# Patient Record
Sex: MALE | Race: BLACK OR AFRICAN AMERICAN | NOT HISPANIC OR LATINO | Employment: OTHER | URBAN - METROPOLITAN AREA
[De-identification: names, ages, dates, MRNs, and addresses within clinical notes are randomized per-mention and may not be internally consistent; named-entity substitution may affect disease eponyms.]

---

## 2023-03-28 DIAGNOSIS — I10 BENIGN ESSENTIAL HYPERTENSION: ICD-10-CM

## 2023-03-29 PROBLEM — R53.81 MALAISE: Status: ACTIVE | Noted: 2023-03-29

## 2023-03-29 PROBLEM — M79.643 HAND PAIN: Status: ACTIVE | Noted: 2023-03-29

## 2023-03-29 PROBLEM — C61 PROSTATE CANCER (MULTI): Status: ACTIVE | Noted: 2023-03-29

## 2023-03-29 PROBLEM — M19.90 ARTHRITIS: Status: ACTIVE | Noted: 2023-03-29

## 2023-03-29 PROBLEM — K92.2 GI BLEED: Status: ACTIVE | Noted: 2023-03-29

## 2023-03-29 PROBLEM — I99.8 ANGIECTASIA: Status: ACTIVE | Noted: 2023-03-29

## 2023-03-29 PROBLEM — R76.8 RHEUMATOID FACTOR POSITIVE: Status: ACTIVE | Noted: 2023-03-29

## 2023-03-29 PROBLEM — R79.89 LOW TSH LEVEL: Status: ACTIVE | Noted: 2023-03-29

## 2023-03-29 PROBLEM — E04.2 MULTIPLE THYROID NODULES: Status: ACTIVE | Noted: 2023-03-29

## 2023-03-29 PROBLEM — E78.5 HYPERLIPIDEMIA: Status: ACTIVE | Noted: 2023-03-29

## 2023-03-29 PROBLEM — I42.9 CARDIOMYOPATHY (MULTI): Status: ACTIVE | Noted: 2023-03-29

## 2023-03-29 PROBLEM — D64.9 ANEMIA: Status: ACTIVE | Noted: 2023-03-29

## 2023-03-29 PROBLEM — N52.9 MALE ERECTILE DISORDER OF ORGANIC ORIGIN: Status: ACTIVE | Noted: 2023-03-29

## 2023-03-29 PROBLEM — K63.5 COLON POLYPS: Status: ACTIVE | Noted: 2023-03-29

## 2023-03-29 PROBLEM — N18.9 CHRONIC RENAL INSUFFICIENCY: Status: ACTIVE | Noted: 2023-03-29

## 2023-03-29 PROBLEM — E05.90 SUBCLINICAL HYPERTHYROIDISM: Status: ACTIVE | Noted: 2023-03-29

## 2023-03-29 PROBLEM — M79.606 PAIN IN LOWER LIMB: Status: ACTIVE | Noted: 2023-03-29

## 2023-03-29 PROBLEM — I10 BENIGN ESSENTIAL HYPERTENSION: Status: ACTIVE | Noted: 2023-03-29

## 2023-03-29 PROBLEM — G47.30 SLEEP APNEA: Status: ACTIVE | Noted: 2023-03-29

## 2023-03-29 PROBLEM — M54.50 LOWER BACK PAIN: Status: ACTIVE | Noted: 2023-03-29

## 2023-03-29 PROBLEM — I48.92 ATRIAL FLUTTER (MULTI): Status: ACTIVE | Noted: 2023-03-29

## 2023-03-29 PROBLEM — E11.9 DIABETES MELLITUS (MULTI): Status: ACTIVE | Noted: 2023-03-29

## 2023-03-29 PROBLEM — N18.31 CHRONIC KIDNEY DISEASE, STAGE 3A (MULTI): Status: ACTIVE | Noted: 2023-03-29

## 2023-03-29 PROBLEM — I48.91 ATRIAL FIBRILLATION (MULTI): Status: ACTIVE | Noted: 2023-03-29

## 2023-03-29 PROBLEM — M79.604 PAIN OF RIGHT LOWER EXTREMITY: Status: ACTIVE | Noted: 2023-03-29

## 2023-03-29 PROBLEM — E55.9 VITAMIN D DEFICIENCY: Status: ACTIVE | Noted: 2023-03-29

## 2023-03-29 PROBLEM — M19.049 ARTHRITIS, DEGENERATIVE, LOCALIZED, PRIMARY, HAND: Status: ACTIVE | Noted: 2023-03-29

## 2023-03-29 RX ORDER — LISINOPRIL 10 MG/1
TABLET ORAL
Qty: 90 TABLET | Refills: 3 | Status: SHIPPED | OUTPATIENT
Start: 2023-03-29 | End: 2023-10-05 | Stop reason: DRUGHIGH

## 2023-04-04 ENCOUNTER — OFFICE VISIT (OUTPATIENT)
Dept: PRIMARY CARE | Facility: CLINIC | Age: 73
End: 2023-04-04
Payer: MEDICARE

## 2023-04-04 VITALS — WEIGHT: 253 LBS | BODY MASS INDEX: 37.47 KG/M2 | HEIGHT: 69 IN

## 2023-04-04 DIAGNOSIS — I42.9 CARDIOMYOPATHY, UNSPECIFIED TYPE (MULTI): ICD-10-CM

## 2023-04-04 DIAGNOSIS — N18.31 CHRONIC KIDNEY DISEASE, STAGE 3A (MULTI): ICD-10-CM

## 2023-04-04 DIAGNOSIS — I48.92 ATRIAL FLUTTER, UNSPECIFIED TYPE (MULTI): ICD-10-CM

## 2023-04-04 DIAGNOSIS — E11.69 TYPE 2 DIABETES MELLITUS WITH OTHER SPECIFIED COMPLICATION, UNSPECIFIED WHETHER LONG TERM INSULIN USE (MULTI): ICD-10-CM

## 2023-04-04 DIAGNOSIS — E66.01 SEVERE OBESITY (BMI 35.0-35.9 WITH COMORBIDITY) (MULTI): ICD-10-CM

## 2023-04-04 DIAGNOSIS — Z00.00 ROUTINE GENERAL MEDICAL EXAMINATION AT HEALTH CARE FACILITY: Primary | ICD-10-CM

## 2023-04-04 DIAGNOSIS — C61 PROSTATE CANCER (MULTI): ICD-10-CM

## 2023-04-04 PROBLEM — E66.09 CLASS 2 OBESITY DUE TO EXCESS CALORIES WITH BODY MASS INDEX (BMI) OF 39.0 TO 39.9 IN ADULT: Status: ACTIVE | Noted: 2023-04-04

## 2023-04-04 PROBLEM — M65.841 OTHER SYNOVITIS AND TENOSYNOVITIS, RIGHT HAND: Status: ACTIVE | Noted: 2023-04-04

## 2023-04-04 PROBLEM — E66.812 CLASS 2 OBESITY DUE TO EXCESS CALORIES WITH BODY MASS INDEX (BMI) OF 38.0 TO 38.9 IN ADULT: Status: ACTIVE | Noted: 2023-04-04

## 2023-04-04 PROBLEM — E66.812 CLASS 2 OBESITY DUE TO EXCESS CALORIES WITH BODY MASS INDEX (BMI) OF 39.0 TO 39.9 IN ADULT: Status: ACTIVE | Noted: 2023-04-04

## 2023-04-04 PROBLEM — M65.849 OTHER SYNOVITIS AND TENOSYNOVITIS, UNSPECIFIED HAND: Status: ACTIVE | Noted: 2023-04-04

## 2023-04-04 PROBLEM — I63.9 STROKE (MULTI): Status: ACTIVE | Noted: 2023-04-04

## 2023-04-04 PROBLEM — E66.09 CLASS 2 OBESITY DUE TO EXCESS CALORIES WITH BODY MASS INDEX (BMI) OF 38.0 TO 38.9 IN ADULT: Status: ACTIVE | Noted: 2023-04-04

## 2023-04-04 LAB
POC FINGERSTICK BLOOD GLUCOSE: 142 MG/DL (ref 70–100)
POC HEMOGLOBIN A1C: 7.4 % (ref 4.2–6.5)

## 2023-04-04 PROCEDURE — 99213 OFFICE O/P EST LOW 20 MIN: CPT | Performed by: INTERNAL MEDICINE

## 2023-04-04 PROCEDURE — 1159F MED LIST DOCD IN RCRD: CPT | Performed by: INTERNAL MEDICINE

## 2023-04-04 PROCEDURE — 82962 GLUCOSE BLOOD TEST: CPT | Performed by: INTERNAL MEDICINE

## 2023-04-04 PROCEDURE — 3008F BODY MASS INDEX DOCD: CPT | Performed by: INTERNAL MEDICINE

## 2023-04-04 PROCEDURE — 1160F RVW MEDS BY RX/DR IN RCRD: CPT | Performed by: INTERNAL MEDICINE

## 2023-04-04 PROCEDURE — 1036F TOBACCO NON-USER: CPT | Performed by: INTERNAL MEDICINE

## 2023-04-04 PROCEDURE — 83036 HEMOGLOBIN GLYCOSYLATED A1C: CPT | Performed by: INTERNAL MEDICINE

## 2023-04-04 PROCEDURE — 1170F FXNL STATUS ASSESSED: CPT | Performed by: INTERNAL MEDICINE

## 2023-04-04 PROCEDURE — G0439 PPPS, SUBSEQ VISIT: HCPCS | Performed by: INTERNAL MEDICINE

## 2023-04-04 PROCEDURE — 4010F ACE/ARB THERAPY RXD/TAKEN: CPT | Performed by: INTERNAL MEDICINE

## 2023-04-04 RX ORDER — BLOOD SUGAR DIAGNOSTIC
STRIP MISCELLANEOUS
COMMUNITY
Start: 2015-05-22

## 2023-04-04 RX ORDER — LANCETS 33 GAUGE
EACH MISCELLANEOUS
COMMUNITY
Start: 2023-01-26

## 2023-04-04 RX ORDER — PANTOPRAZOLE SODIUM 40 MG/1
1 TABLET, DELAYED RELEASE ORAL DAILY
COMMUNITY
Start: 2021-01-19 | End: 2023-08-04

## 2023-04-04 RX ORDER — DAPAGLIFLOZIN 10 MG/1
1 TABLET, FILM COATED ORAL DAILY
COMMUNITY
Start: 2023-03-10 | End: 2023-08-02

## 2023-04-04 RX ORDER — ATORVASTATIN CALCIUM 20 MG/1
1 TABLET, FILM COATED ORAL DAILY
COMMUNITY
End: 2023-11-15 | Stop reason: SDUPTHER

## 2023-04-04 RX ORDER — DAPAGLIFLOZIN 5 MG/1
1 TABLET, FILM COATED ORAL
COMMUNITY
Start: 2022-05-03 | End: 2023-04-04 | Stop reason: SDUPTHER

## 2023-04-04 RX ORDER — DILTIAZEM HYDROCHLORIDE EXTENDED-RELEASE TABLETS 240 MG/1
1 TABLET, EXTENDED RELEASE ORAL DAILY
COMMUNITY

## 2023-04-04 RX ORDER — BLOOD SUGAR DIAGNOSTIC
STRIP MISCELLANEOUS 2 TIMES DAILY
COMMUNITY
Start: 2021-07-31 | End: 2023-05-26

## 2023-04-04 RX ORDER — TRIAMTERENE AND HYDROCHLOROTHIAZIDE 75; 50 MG/1; MG/1
1 TABLET ORAL DAILY
COMMUNITY
Start: 2021-09-20 | End: 2023-12-07

## 2023-04-04 RX ORDER — WARFARIN 10 MG/1
2 TABLET ORAL DAILY
COMMUNITY
End: 2024-01-25 | Stop reason: SDUPTHER

## 2023-04-04 RX ORDER — MULTIVITAMIN
1 TABLET ORAL DAILY
COMMUNITY

## 2023-04-04 ASSESSMENT — PATIENT HEALTH QUESTIONNAIRE - PHQ9
SUM OF ALL RESPONSES TO PHQ9 QUESTIONS 1 AND 2: 0
2. FEELING DOWN, DEPRESSED OR HOPELESS: NOT AT ALL
1. LITTLE INTEREST OR PLEASURE IN DOING THINGS: NOT AT ALL

## 2023-04-04 ASSESSMENT — ACTIVITIES OF DAILY LIVING (ADL)
DOING_HOUSEWORK: INDEPENDENT
DRESSING: INDEPENDENT
GROCERY_SHOPPING: INDEPENDENT
TAKING_MEDICATION: INDEPENDENT
BATHING: INDEPENDENT
DRESSING: INDEPENDENT
MANAGING_FINANCES: INDEPENDENT
BATHING: INDEPENDENT

## 2023-04-04 ASSESSMENT — ENCOUNTER SYMPTOMS: ARTHRALGIAS: 1

## 2023-04-04 NOTE — PROGRESS NOTES
"Subjective   Reason for Visit: Elver Mc is an 72 y.o. male here for a Medicare Wellness visit.               HPI    Patient Care Team:  Manjinder Francis MD as PCP - General  Manjinder Francis MD as PCP - [employer] PCP     Review of Systems    Objective   Vitals:  Ht 1.74 m (5' 8.5\")   Wt 115 kg (253 lb)   BMI 37.91 kg/m²       Physical Exam    Assessment/Plan   Problem List Items Addressed This Visit          Endocrine/Metabolic    Diabetes mellitus (CMS/HCC)    Overview     Stable based on symptoms and exam. Continue established treatment plan and follow up at least yearly               Relevant Orders    POCT glycosylated hemoglobin (Hb A1C) manually resulted (Completed)    POCT Fingerstick Glucose manually resulted (Completed)          "

## 2023-04-04 NOTE — PROGRESS NOTES
"Subjective   Patient ID: Elver Mc is a 72 y.o. male who presents for Medicare Annual Wellness Visit Subsequent.    Patient presents for wellness exam and follow-up.  He has been compliant with his medications, diet but not exercise.  He overall feels well.  He denies any headaches, no dizziness, no chest pain or shortness of breath.  He denies abdominal pain no nausea vomiting or diarrhea.  He reports baseline arthritis symptoms.         Review of Systems   Musculoskeletal:  Positive for arthralgias.   All other systems reviewed and are negative.      Objective   Ht 1.74 m (5' 8.5\")   Wt 115 kg (253 lb)   BMI 37.91 kg/m²     Physical Exam  Constitutional:       Appearance: Normal appearance.   Cardiovascular:      Rate and Rhythm: Normal rate and regular rhythm.      Heart sounds: No murmur heard.     No gallop.   Pulmonary:      Effort: No respiratory distress.      Breath sounds: No wheezing or rales.   Abdominal:      General: There is no distension.      Palpations: There is no mass.      Tenderness: There is no abdominal tenderness. There is no guarding.   Musculoskeletal:      Right lower leg: No edema.      Left lower leg: No edema.   Neurological:      Mental Status: He is alert.         Assessment/Plan   Diagnoses and all orders for this visit:  Type 2 diabetes mellitus with other specified complication, unspecified whether long term insulin use (CMS/[facility])-hemoglobin A1c was 7.4.  He will diet and exercise.  Ophthalmology appointment has been done  -     POCT glycosylated hemoglobin (Hb A1C) manually resulted  -     POCT Fingerstick Glucose manually resulted  Cardiomyopathy, unspecified type (CMS/[facility])-he will follow-up with cardiology  Atrial flutter, unspecified type (CMS/[facility])-rate is controlled.  We will continue with anticoagulation  Severe obesity (BMI 35.0-35.9 with comorbidity) (CMS/HCC)-he will diet and exercise  Prostate cancer (CMS/[facility])-follow-up with urology  Chronic kidney disease, stage " 3a-recheck PSA at next visit.  Health maintenance-colonoscopy has been done.  Immunizations are up-to-date.

## 2023-05-25 DIAGNOSIS — E11.69 TYPE 2 DIABETES MELLITUS WITH OTHER SPECIFIED COMPLICATION, UNSPECIFIED WHETHER LONG TERM INSULIN USE (MULTI): ICD-10-CM

## 2023-05-26 RX ORDER — BLOOD SUGAR DIAGNOSTIC
STRIP MISCELLANEOUS
Qty: 200 STRIP | Refills: 2 | Status: SHIPPED | OUTPATIENT
Start: 2023-05-26 | End: 2024-02-06 | Stop reason: SDUPTHER

## 2023-07-05 ENCOUNTER — OFFICE VISIT (OUTPATIENT)
Dept: PRIMARY CARE | Facility: CLINIC | Age: 73
End: 2023-07-05
Payer: MEDICARE

## 2023-07-05 ENCOUNTER — LAB (OUTPATIENT)
Dept: LAB | Facility: LAB | Age: 73
End: 2023-07-05
Payer: MEDICARE

## 2023-07-05 VITALS
SYSTOLIC BLOOD PRESSURE: 118 MMHG | DIASTOLIC BLOOD PRESSURE: 76 MMHG | BODY MASS INDEX: 36.98 KG/M2 | HEIGHT: 68 IN | WEIGHT: 244 LBS | HEART RATE: 72 BPM

## 2023-07-05 DIAGNOSIS — E55.9 VITAMIN D DEFICIENCY: ICD-10-CM

## 2023-07-05 DIAGNOSIS — E78.2 MIXED HYPERLIPIDEMIA: ICD-10-CM

## 2023-07-05 DIAGNOSIS — E66.01 SEVERE OBESITY (BMI 35.0-35.9 WITH COMORBIDITY) (MULTI): ICD-10-CM

## 2023-07-05 DIAGNOSIS — D50.9 IRON DEFICIENCY ANEMIA, UNSPECIFIED IRON DEFICIENCY ANEMIA TYPE: ICD-10-CM

## 2023-07-05 DIAGNOSIS — E78.2 MIXED HYPERLIPIDEMIA: Primary | ICD-10-CM

## 2023-07-05 DIAGNOSIS — C61 PROSTATE CANCER (MULTI): ICD-10-CM

## 2023-07-05 DIAGNOSIS — Z12.5 SCREENING FOR PROSTATE CANCER: ICD-10-CM

## 2023-07-05 DIAGNOSIS — I48.91 ATRIAL FIBRILLATION, UNSPECIFIED TYPE (MULTI): ICD-10-CM

## 2023-07-05 DIAGNOSIS — R53.81 MALAISE: ICD-10-CM

## 2023-07-05 DIAGNOSIS — N18.2 CHRONIC RENAL IMPAIRMENT, STAGE 2 (MILD): ICD-10-CM

## 2023-07-05 DIAGNOSIS — I10 BENIGN ESSENTIAL HYPERTENSION: ICD-10-CM

## 2023-07-05 DIAGNOSIS — G47.30 SLEEP APNEA, UNSPECIFIED TYPE: ICD-10-CM

## 2023-07-05 DIAGNOSIS — I63.9 CEREBROVASCULAR ACCIDENT (CVA), UNSPECIFIED MECHANISM (MULTI): ICD-10-CM

## 2023-07-05 DIAGNOSIS — I42.9 CARDIOMYOPATHY, UNSPECIFIED TYPE (MULTI): ICD-10-CM

## 2023-07-05 DIAGNOSIS — E11.69 TYPE 2 DIABETES MELLITUS WITH OTHER SPECIFIED COMPLICATION, UNSPECIFIED WHETHER LONG TERM INSULIN USE (MULTI): ICD-10-CM

## 2023-07-05 LAB
ALANINE AMINOTRANSFERASE (SGPT) (U/L) IN SER/PLAS: 16 U/L (ref 10–52)
ALBUMIN (G/DL) IN SER/PLAS: 4.5 G/DL (ref 3.4–5)
ALBUMIN (MG/L) IN URINE: <7 MG/L
ALBUMIN/CREATININE (UG/MG) IN URINE: NORMAL UG/MG CRT (ref 0–30)
ALKALINE PHOSPHATASE (U/L) IN SER/PLAS: 63 U/L (ref 33–136)
ANION GAP IN SER/PLAS: 17 MMOL/L (ref 10–20)
APPEARANCE, URINE: CLEAR
ASPARTATE AMINOTRANSFERASE (SGOT) (U/L) IN SER/PLAS: 19 U/L (ref 9–39)
BASOPHILS (10*3/UL) IN BLOOD BY AUTOMATED COUNT: 0.01 X10E9/L (ref 0–0.1)
BASOPHILS/100 LEUKOCYTES IN BLOOD BY AUTOMATED COUNT: 0.2 % (ref 0–2)
BILIRUBIN TOTAL (MG/DL) IN SER/PLAS: 0.9 MG/DL (ref 0–1.2)
BILIRUBIN, URINE: NEGATIVE
BLOOD, URINE: NEGATIVE
CALCIDIOL (25 OH VITAMIN D3) (NG/ML) IN SER/PLAS: 31 NG/ML
CALCIUM (MG/DL) IN SER/PLAS: 10 MG/DL (ref 8.6–10.6)
CARBON DIOXIDE, TOTAL (MMOL/L) IN SER/PLAS: 26 MMOL/L (ref 21–32)
CHLORIDE (MMOL/L) IN SER/PLAS: 101 MMOL/L (ref 98–107)
CHOLESTEROL (MG/DL) IN SER/PLAS: 153 MG/DL (ref 0–199)
CHOLESTEROL IN HDL (MG/DL) IN SER/PLAS: 59 MG/DL
CHOLESTEROL/HDL RATIO: 2.6
COLOR, URINE: YELLOW
CREATININE (MG/DL) IN SER/PLAS: 1.88 MG/DL (ref 0.5–1.3)
CREATININE (MG/DL) IN URINE: 62.5 MG/DL (ref 20–370)
EOSINOPHILS (10*3/UL) IN BLOOD BY AUTOMATED COUNT: 0.04 X10E9/L (ref 0–0.4)
EOSINOPHILS/100 LEUKOCYTES IN BLOOD BY AUTOMATED COUNT: 0.9 % (ref 0–6)
ERYTHROCYTE DISTRIBUTION WIDTH (RATIO) BY AUTOMATED COUNT: 15.6 % (ref 11.5–14.5)
ERYTHROCYTE MEAN CORPUSCULAR HEMOGLOBIN CONCENTRATION (G/DL) BY AUTOMATED: 32.8 G/DL (ref 32–36)
ERYTHROCYTE MEAN CORPUSCULAR VOLUME (FL) BY AUTOMATED COUNT: 84 FL (ref 80–100)
ERYTHROCYTES (10*6/UL) IN BLOOD BY AUTOMATED COUNT: 5.11 X10E12/L (ref 4.5–5.9)
FERRITIN (UG/LL) IN SER/PLAS: 695 UG/L (ref 20–300)
GFR MALE: 38 ML/MIN/1.73M2
GLUCOSE (MG/DL) IN SER/PLAS: 109 MG/DL (ref 74–99)
GLUCOSE, URINE: ABNORMAL MG/DL
HEMATOCRIT (%) IN BLOOD BY AUTOMATED COUNT: 43 % (ref 41–52)
HEMOGLOBIN (G/DL) IN BLOOD: 14.1 G/DL (ref 13.5–17.5)
IMMATURE GRANULOCYTES/100 LEUKOCYTES IN BLOOD BY AUTOMATED COUNT: 0.9 % (ref 0–0.9)
IRON (UG/DL) IN SER/PLAS: 134 UG/DL (ref 35–150)
IRON BINDING CAPACITY (UG/DL) IN SER/PLAS: 346 UG/DL (ref 240–445)
IRON SATURATION (%) IN SER/PLAS: 39 % (ref 25–45)
KETONES, URINE: NEGATIVE MG/DL
LDL: 79 MG/DL (ref 0–99)
LEUKOCYTE ESTERASE, URINE: NEGATIVE
LEUKOCYTES (10*3/UL) IN BLOOD BY AUTOMATED COUNT: 4.3 X10E9/L (ref 4.4–11.3)
LYMPHOCYTES (10*3/UL) IN BLOOD BY AUTOMATED COUNT: 1.31 X10E9/L (ref 0.8–3)
LYMPHOCYTES/100 LEUKOCYTES IN BLOOD BY AUTOMATED COUNT: 30.3 % (ref 13–44)
MONOCYTES (10*3/UL) IN BLOOD BY AUTOMATED COUNT: 0.5 X10E9/L (ref 0.05–0.8)
MONOCYTES/100 LEUKOCYTES IN BLOOD BY AUTOMATED COUNT: 11.5 % (ref 2–10)
NEUTROPHILS (10*3/UL) IN BLOOD BY AUTOMATED COUNT: 2.43 X10E9/L (ref 1.6–5.5)
NEUTROPHILS/100 LEUKOCYTES IN BLOOD BY AUTOMATED COUNT: 56.2 % (ref 40–80)
NITRITE, URINE: NEGATIVE
NRBC (PER 100 WBCS) BY AUTOMATED COUNT: 0 /100 WBC (ref 0–0)
PH, URINE: 7 (ref 5–8)
PLATELETS (10*3/UL) IN BLOOD AUTOMATED COUNT: 192 X10E9/L (ref 150–450)
POC FINGERSTICK BLOOD GLUCOSE: 120 MG/DL (ref 70–100)
POC HEMOGLOBIN A1C: 6.6 % (ref 4.2–6.5)
POTASSIUM (MMOL/L) IN SER/PLAS: 4.6 MMOL/L (ref 3.5–5.3)
PROSTATE SPECIFIC AG (NG/ML) IN SER/PLAS: 0.99 NG/ML (ref 0–4)
PROTEIN TOTAL: 7.3 G/DL (ref 6.4–8.2)
PROTEIN, URINE: NEGATIVE MG/DL
SODIUM (MMOL/L) IN SER/PLAS: 139 MMOL/L (ref 136–145)
SPECIFIC GRAVITY, URINE: 1.01 (ref 1–1.03)
THYROTROPIN (MIU/L) IN SER/PLAS BY DETECTION LIMIT <= 0.05 MIU/L: 0.35 MIU/L (ref 0.44–3.98)
THYROXINE (T4) FREE (NG/DL) IN SER/PLAS: 1.27 NG/DL (ref 0.78–1.48)
TRIGLYCERIDE (MG/DL) IN SER/PLAS: 76 MG/DL (ref 0–149)
URATE (MG/DL) IN SER/PLAS: 7.8 MG/DL (ref 4–7.5)
UREA NITROGEN (MG/DL) IN SER/PLAS: 35 MG/DL (ref 6–23)
UROBILINOGEN, URINE: <2 MG/DL (ref 0–1.9)
VLDL: 15 MG/DL (ref 0–40)

## 2023-07-05 PROCEDURE — 83540 ASSAY OF IRON: CPT

## 2023-07-05 PROCEDURE — 82962 GLUCOSE BLOOD TEST: CPT | Performed by: INTERNAL MEDICINE

## 2023-07-05 PROCEDURE — 80061 LIPID PANEL: CPT

## 2023-07-05 PROCEDURE — 3008F BODY MASS INDEX DOCD: CPT | Performed by: INTERNAL MEDICINE

## 2023-07-05 PROCEDURE — 3078F DIAST BP <80 MM HG: CPT | Performed by: INTERNAL MEDICINE

## 2023-07-05 PROCEDURE — 36415 COLL VENOUS BLD VENIPUNCTURE: CPT

## 2023-07-05 PROCEDURE — 82043 UR ALBUMIN QUANTITATIVE: CPT

## 2023-07-05 PROCEDURE — G0103 PSA SCREENING: HCPCS

## 2023-07-05 PROCEDURE — 3074F SYST BP LT 130 MM HG: CPT | Performed by: INTERNAL MEDICINE

## 2023-07-05 PROCEDURE — 1036F TOBACCO NON-USER: CPT | Performed by: INTERNAL MEDICINE

## 2023-07-05 PROCEDURE — 80053 COMPREHEN METABOLIC PANEL: CPT

## 2023-07-05 PROCEDURE — 1159F MED LIST DOCD IN RCRD: CPT | Performed by: INTERNAL MEDICINE

## 2023-07-05 PROCEDURE — 99213 OFFICE O/P EST LOW 20 MIN: CPT | Performed by: INTERNAL MEDICINE

## 2023-07-05 PROCEDURE — 82306 VITAMIN D 25 HYDROXY: CPT

## 2023-07-05 PROCEDURE — 82570 ASSAY OF URINE CREATININE: CPT

## 2023-07-05 PROCEDURE — 82728 ASSAY OF FERRITIN: CPT

## 2023-07-05 PROCEDURE — 84550 ASSAY OF BLOOD/URIC ACID: CPT

## 2023-07-05 PROCEDURE — 81003 URINALYSIS AUTO W/O SCOPE: CPT

## 2023-07-05 PROCEDURE — 84443 ASSAY THYROID STIM HORMONE: CPT

## 2023-07-05 PROCEDURE — 85025 COMPLETE CBC W/AUTO DIFF WBC: CPT

## 2023-07-05 PROCEDURE — 83550 IRON BINDING TEST: CPT

## 2023-07-05 PROCEDURE — 1160F RVW MEDS BY RX/DR IN RCRD: CPT | Performed by: INTERNAL MEDICINE

## 2023-07-05 PROCEDURE — 4010F ACE/ARB THERAPY RXD/TAKEN: CPT | Performed by: INTERNAL MEDICINE

## 2023-07-05 PROCEDURE — 83036 HEMOGLOBIN GLYCOSYLATED A1C: CPT | Performed by: INTERNAL MEDICINE

## 2023-07-05 PROCEDURE — 84439 ASSAY OF FREE THYROXINE: CPT

## 2023-07-05 ASSESSMENT — ENCOUNTER SYMPTOMS
MYALGIAS: 0
SLEEP DISTURBANCE: 0
NAUSEA: 0
LIGHT-HEADEDNESS: 0
CHOKING: 0
STRIDOR: 0
RHINORRHEA: 0
FLANK PAIN: 0
WEAKNESS: 0
DIAPHORESIS: 0
SPEECH DIFFICULTY: 0
SHORTNESS OF BREATH: 0
SEIZURES: 0
HEADACHES: 0
TROUBLE SWALLOWING: 0
SINUS PRESSURE: 0
HEMATURIA: 0
SORE THROAT: 0
COLOR CHANGE: 0
NUMBNESS: 0
NECK PAIN: 0
BRUISES/BLEEDS EASILY: 0
ARTHRALGIAS: 0
EYE REDNESS: 0
CHEST TIGHTNESS: 0
JOINT SWELLING: 0
FACIAL ASYMMETRY: 0
VOMITING: 0
TREMORS: 0
EYE DISCHARGE: 0
DIZZINESS: 0
FREQUENCY: 0
DYSURIA: 0
HYPERTENSION: 1
ACTIVITY CHANGE: 0
POLYPHAGIA: 0
APPETITE CHANGE: 0
NECK STIFFNESS: 0
WOUND: 0
CONSTIPATION: 0
POLYDIPSIA: 0
ABDOMINAL PAIN: 0
EYE ITCHING: 0
BLOOD IN STOOL: 0
ADENOPATHY: 0
EYE PAIN: 0
DIFFICULTY URINATING: 0
VOICE CHANGE: 0
ABDOMINAL DISTENTION: 0
FATIGUE: 0
WHEEZING: 0
SINUS PAIN: 0
ANAL BLEEDING: 0
PALPITATIONS: 0
COUGH: 0
RECTAL PAIN: 0
BACK PAIN: 0
DIARRHEA: 0
CHILLS: 0
PHOTOPHOBIA: 0
FACIAL SWELLING: 0

## 2023-07-05 ASSESSMENT — PATIENT HEALTH QUESTIONNAIRE - PHQ9
SUM OF ALL RESPONSES TO PHQ9 QUESTIONS 1 AND 2: 0
1. LITTLE INTEREST OR PLEASURE IN DOING THINGS: NOT AT ALL
2. FEELING DOWN, DEPRESSED OR HOPELESS: NOT AT ALL

## 2023-07-05 NOTE — PROGRESS NOTES
Subjective   Patient ID: Elver Mc is a 72 y.o. male who presents for Hypertension and Diabetes.    Patient presents for follow-up.  He has been compliant with his medications, diet but not exercise.  He reports baseline arthritis symptoms.  He otherwise feels well.  He denies any headaches, no dizziness, chest pain or shortness of breath.  He denies abdominal pain no nausea vomiting or diarrhea.    Hypertension  Pertinent negatives include no chest pain, headaches, neck pain, palpitations or shortness of breath.   Diabetes  Pertinent negatives for hypoglycemia include no dizziness, headaches, pallor, seizures, speech difficulty or tremors. Pertinent negatives for diabetes include no chest pain, no fatigue, no polydipsia, no polyphagia, no polyuria and no weakness.        Review of Systems   Constitutional:  Negative for activity change, appetite change, chills, diaphoresis and fatigue.   HENT:  Negative for congestion, dental problem, drooling, ear discharge, ear pain, facial swelling, hearing loss, mouth sores, nosebleeds, postnasal drip, rhinorrhea, sinus pressure, sinus pain, sneezing, sore throat, tinnitus, trouble swallowing and voice change.    Eyes:  Negative for photophobia, pain, discharge, redness, itching and visual disturbance.   Respiratory:  Negative for cough, choking, chest tightness, shortness of breath, wheezing and stridor.    Cardiovascular:  Negative for chest pain, palpitations and leg swelling.   Gastrointestinal:  Negative for abdominal distention, abdominal pain, anal bleeding, blood in stool, constipation, diarrhea, nausea, rectal pain and vomiting.   Endocrine: Negative for cold intolerance, heat intolerance, polydipsia, polyphagia and polyuria.   Genitourinary:  Negative for decreased urine volume, difficulty urinating, dysuria, enuresis, flank pain, frequency, genital sores, hematuria and urgency.   Musculoskeletal:  Negative for arthralgias, back pain, gait problem, joint  "swelling, myalgias, neck pain and neck stiffness.   Skin:  Negative for color change, pallor, rash and wound.   Neurological:  Negative for dizziness, tremors, seizures, syncope, facial asymmetry, speech difficulty, weakness, light-headedness, numbness and headaches.   Hematological:  Negative for adenopathy. Does not bruise/bleed easily.   Psychiatric/Behavioral:  Negative for sleep disturbance.        Objective   /76   Pulse 72   Ht 1.727 m (5' 8\")   Wt 111 kg (244 lb)   BMI 37.10 kg/m²     Physical Exam  Constitutional:       Appearance: Normal appearance.   Cardiovascular:      Rate and Rhythm: Normal rate and regular rhythm.      Heart sounds: No murmur heard.     No gallop.   Pulmonary:      Effort: No respiratory distress.      Breath sounds: No wheezing or rales.   Abdominal:      General: There is no distension.      Palpations: There is no mass.      Tenderness: There is no abdominal tenderness. There is no guarding.   Musculoskeletal:      Right lower leg: No edema.      Left lower leg: No edema.   Neurological:      Mental Status: He is alert.         Assessment/Plan   Diagnoses and all orders for this visit:  Mixed hyperlipidemia-we will continue with atorvastatin.-  -     Lipid Panel; Future  Type 2 diabetes mellitus with other specified complication, unspecified whether long term insulin use (CMS/[facility])-hemoglobin A1c was 6.6.  Diet and exercise.  Ophthalmology appointment has been done  -     POCT Fingerstick Glucose manually resulted  -     POCT glycosylated hemoglobin (Hb A1C) manually resulted  Benign essential hypertension-low-salt diet and exercise  -     Albumin , Urine Random; Future  -     Comprehensive Metabolic Panel; Future  -     Uric Acid; Future  -     Urinalysis with Reflex Microscopic; Future  Iron deficiency anemia, unspecified iron deficiency anemia type-recheck CBC  -     Iron and TIBC; Future  -     Ferritin; Future  Malaise  -     CBC and Auto Differential; Future  -     " TSH with reflex to Free T4 if abnormal; Future  Screening for prostate cancer  -     Prostate Specific Antigen; Future  Vitamin D deficiency  -     Vitamin D, Total; Future  Cardiomyopathy, unspecified type (CMS/HCC)-follow-up with cardiology  Atrial fibrillation, unspecified type (CMS/HCC)-rate is controlled.  We will continue with anticoagulation  Severe obesity (BMI 35.0-35.9 with comorbidity) (CMS/HCC)-he will continue with diet and exercise  Chronic renal impairment, stage 2 (mild)-recheck creatinine  Prostate cancer (CMS/HCC)-check PSA  Sleep apnea, unspecified type  Cerebrovascular accident (CVA), unspecified mechanism (CMS/HCC)-we will modify risk factors.  Health maintenance-colonoscopy has been done.  Immunizations are up-to-date

## 2023-07-06 DIAGNOSIS — R79.89 LOW TSH LEVEL: ICD-10-CM

## 2023-07-06 DIAGNOSIS — R53.81 MALAISE: ICD-10-CM

## 2023-07-06 DIAGNOSIS — I10 BENIGN ESSENTIAL HYPERTENSION: Primary | ICD-10-CM

## 2023-07-20 ENCOUNTER — LAB (OUTPATIENT)
Dept: LAB | Facility: LAB | Age: 73
End: 2023-07-20
Payer: MEDICARE

## 2023-07-20 DIAGNOSIS — R53.81 MALAISE: ICD-10-CM

## 2023-07-20 DIAGNOSIS — I10 BENIGN ESSENTIAL HYPERTENSION: ICD-10-CM

## 2023-07-20 DIAGNOSIS — R79.89 LOW TSH LEVEL: ICD-10-CM

## 2023-07-20 LAB
ANION GAP IN SER/PLAS: 18 MMOL/L (ref 10–20)
CALCIUM (MG/DL) IN SER/PLAS: 10.5 MG/DL (ref 8.6–10.6)
CARBON DIOXIDE, TOTAL (MMOL/L) IN SER/PLAS: 25 MMOL/L (ref 21–32)
CHLORIDE (MMOL/L) IN SER/PLAS: 104 MMOL/L (ref 98–107)
CREATININE (MG/DL) IN SER/PLAS: 1.97 MG/DL (ref 0.5–1.3)
GFR MALE: 35 ML/MIN/1.73M2
GLUCOSE (MG/DL) IN SER/PLAS: 115 MG/DL (ref 74–99)
POTASSIUM (MMOL/L) IN SER/PLAS: 4.8 MMOL/L (ref 3.5–5.3)
SODIUM (MMOL/L) IN SER/PLAS: 142 MMOL/L (ref 136–145)
THYROTROPIN (MIU/L) IN SER/PLAS BY DETECTION LIMIT <= 0.05 MIU/L: 0.58 MIU/L (ref 0.44–3.98)
UREA NITROGEN (MG/DL) IN SER/PLAS: 27 MG/DL (ref 6–23)

## 2023-07-20 PROCEDURE — 84443 ASSAY THYROID STIM HORMONE: CPT

## 2023-07-20 PROCEDURE — 36415 COLL VENOUS BLD VENIPUNCTURE: CPT

## 2023-07-20 PROCEDURE — 80048 BASIC METABOLIC PNL TOTAL CA: CPT

## 2023-07-21 DIAGNOSIS — N18.31 CHRONIC KIDNEY DISEASE, STAGE 3A (MULTI): Primary | ICD-10-CM

## 2023-07-26 ENCOUNTER — LAB (OUTPATIENT)
Dept: LAB | Facility: LAB | Age: 73
End: 2023-07-26
Payer: MEDICARE

## 2023-07-26 DIAGNOSIS — E11.9 TYPE 2 DIABETES MELLITUS WITHOUT COMPLICATIONS (MULTI): ICD-10-CM

## 2023-07-26 DIAGNOSIS — N18.31 CHRONIC KIDNEY DISEASE, STAGE 3A (MULTI): ICD-10-CM

## 2023-07-26 LAB
ALBUMIN (G/DL) IN SER/PLAS: 4.5 G/DL (ref 3.4–5)
ANION GAP IN SER/PLAS: 17 MMOL/L (ref 10–20)
CALCIUM (MG/DL) IN SER/PLAS: 9.5 MG/DL (ref 8.6–10.6)
CARBON DIOXIDE, TOTAL (MMOL/L) IN SER/PLAS: 21 MMOL/L (ref 21–32)
CHLORIDE (MMOL/L) IN SER/PLAS: 100 MMOL/L (ref 98–107)
CREATININE (MG/DL) IN SER/PLAS: 2.86 MG/DL (ref 0.5–1.3)
GFR MALE: 23 ML/MIN/1.73M2
GLUCOSE (MG/DL) IN SER/PLAS: 99 MG/DL (ref 74–99)
PHOSPHATE (MG/DL) IN SER/PLAS: 3.2 MG/DL (ref 2.5–4.9)
POTASSIUM (MMOL/L) IN SER/PLAS: 4.2 MMOL/L (ref 3.5–5.3)
SODIUM (MMOL/L) IN SER/PLAS: 134 MMOL/L (ref 136–145)
UREA NITROGEN (MG/DL) IN SER/PLAS: 32 MG/DL (ref 6–23)

## 2023-07-26 PROCEDURE — 80069 RENAL FUNCTION PANEL: CPT

## 2023-07-26 PROCEDURE — 36415 COLL VENOUS BLD VENIPUNCTURE: CPT

## 2023-07-28 ENCOUNTER — LAB (OUTPATIENT)
Dept: LAB | Facility: LAB | Age: 73
End: 2023-07-28
Payer: MEDICARE

## 2023-07-28 DIAGNOSIS — N18.31 CHRONIC KIDNEY DISEASE, STAGE 3A (MULTI): ICD-10-CM

## 2023-07-28 DIAGNOSIS — N18.31 CHRONIC KIDNEY DISEASE, STAGE 3A (MULTI): Primary | ICD-10-CM

## 2023-07-28 LAB
ANION GAP IN SER/PLAS: 18 MMOL/L (ref 10–20)
CALCIUM (MG/DL) IN SER/PLAS: 10.1 MG/DL (ref 8.6–10.6)
CARBON DIOXIDE, TOTAL (MMOL/L) IN SER/PLAS: 22 MMOL/L (ref 21–32)
CHLORIDE (MMOL/L) IN SER/PLAS: 103 MMOL/L (ref 98–107)
CREATININE (MG/DL) IN SER/PLAS: 2.92 MG/DL (ref 0.5–1.3)
GFR MALE: 22 ML/MIN/1.73M2
GLUCOSE (MG/DL) IN SER/PLAS: 92 MG/DL (ref 74–99)
POTASSIUM (MMOL/L) IN SER/PLAS: 5.2 MMOL/L (ref 3.5–5.3)
SODIUM (MMOL/L) IN SER/PLAS: 138 MMOL/L (ref 136–145)
UREA NITROGEN (MG/DL) IN SER/PLAS: 39 MG/DL (ref 6–23)

## 2023-07-28 PROCEDURE — 80048 BASIC METABOLIC PNL TOTAL CA: CPT

## 2023-07-28 PROCEDURE — 36415 COLL VENOUS BLD VENIPUNCTURE: CPT

## 2023-08-01 DIAGNOSIS — K27.4 GASTROINTESTINAL HEMORRHAGE ASSOCIATED WITH PEPTIC ULCER: ICD-10-CM

## 2023-08-02 ENCOUNTER — LAB (OUTPATIENT)
Dept: LAB | Facility: LAB | Age: 73
End: 2023-08-02
Payer: MEDICARE

## 2023-08-02 DIAGNOSIS — N18.31 CHRONIC KIDNEY DISEASE, STAGE 3A (MULTI): ICD-10-CM

## 2023-08-02 DIAGNOSIS — N18.2 CHRONIC RENAL IMPAIRMENT, STAGE 2 (MILD): Primary | ICD-10-CM

## 2023-08-02 LAB
ANION GAP IN SER/PLAS: 15 MMOL/L (ref 10–20)
CALCIUM (MG/DL) IN SER/PLAS: 9.9 MG/DL (ref 8.6–10.6)
CARBON DIOXIDE, TOTAL (MMOL/L) IN SER/PLAS: 21 MMOL/L (ref 21–32)
CHLORIDE (MMOL/L) IN SER/PLAS: 109 MMOL/L (ref 98–107)
CREATININE (MG/DL) IN SER/PLAS: 1.93 MG/DL (ref 0.5–1.3)
GFR MALE: 36 ML/MIN/1.73M2
GLUCOSE (MG/DL) IN SER/PLAS: 102 MG/DL (ref 74–99)
POTASSIUM (MMOL/L) IN SER/PLAS: 4.2 MMOL/L (ref 3.5–5.3)
SODIUM (MMOL/L) IN SER/PLAS: 141 MMOL/L (ref 136–145)
UREA NITROGEN (MG/DL) IN SER/PLAS: 37 MG/DL (ref 6–23)

## 2023-08-02 PROCEDURE — 80048 BASIC METABOLIC PNL TOTAL CA: CPT

## 2023-08-02 PROCEDURE — 36415 COLL VENOUS BLD VENIPUNCTURE: CPT

## 2023-08-02 RX ORDER — DAPAGLIFLOZIN 10 MG/1
10 TABLET, FILM COATED ORAL DAILY
Qty: 30 TABLET | Refills: 5 | Status: SHIPPED | OUTPATIENT
Start: 2023-08-02 | End: 2024-01-25 | Stop reason: SDUPTHER

## 2023-08-04 RX ORDER — PANTOPRAZOLE SODIUM 40 MG/1
40 TABLET, DELAYED RELEASE ORAL DAILY
Qty: 100 TABLET | Refills: 2 | Status: SHIPPED | OUTPATIENT
Start: 2023-08-04 | End: 2024-04-15

## 2023-09-18 DIAGNOSIS — I48.92 ATRIAL FLUTTER, UNSPECIFIED TYPE (MULTI): ICD-10-CM

## 2023-09-18 DIAGNOSIS — Z79.01 LONG TERM (CURRENT) USE OF ANTICOAGULANTS: ICD-10-CM

## 2023-09-18 DIAGNOSIS — I48.91 ATRIAL FIBRILLATION, UNSPECIFIED TYPE (MULTI): Primary | ICD-10-CM

## 2023-09-18 LAB
INR IN PPP BY COAGULATION ASSAY EXTERNAL: 2.1
PROTHROMBIN TIME (PT) IN PPP BY COAGULATION ASSAY EXTERNAL: NORMAL SECONDS

## 2023-09-26 ENCOUNTER — DOCUMENTATION (OUTPATIENT)
Dept: CARE COORDINATION | Facility: CLINIC | Age: 73
End: 2023-09-26
Payer: MEDICARE

## 2023-10-05 ENCOUNTER — LAB (OUTPATIENT)
Dept: LAB | Facility: LAB | Age: 73
End: 2023-10-05
Payer: MEDICARE

## 2023-10-05 ENCOUNTER — OFFICE VISIT (OUTPATIENT)
Dept: PRIMARY CARE | Facility: CLINIC | Age: 73
End: 2023-10-05
Payer: MEDICARE

## 2023-10-05 VITALS
TEMPERATURE: 98 F | BODY MASS INDEX: 36.95 KG/M2 | WEIGHT: 243 LBS | DIASTOLIC BLOOD PRESSURE: 80 MMHG | HEART RATE: 72 BPM | SYSTOLIC BLOOD PRESSURE: 150 MMHG

## 2023-10-05 DIAGNOSIS — N18.2 CHRONIC RENAL IMPAIRMENT, STAGE 2 (MILD): ICD-10-CM

## 2023-10-05 DIAGNOSIS — E78.2 MIXED HYPERLIPIDEMIA: ICD-10-CM

## 2023-10-05 DIAGNOSIS — I63.9 CEREBROVASCULAR ACCIDENT (CVA), UNSPECIFIED MECHANISM (MULTI): ICD-10-CM

## 2023-10-05 DIAGNOSIS — E66.01 SEVERE OBESITY (BMI 35.0-35.9 WITH COMORBIDITY) (MULTI): ICD-10-CM

## 2023-10-05 DIAGNOSIS — I10 BENIGN ESSENTIAL HYPERTENSION: ICD-10-CM

## 2023-10-05 DIAGNOSIS — C61 PROSTATE CANCER (MULTI): ICD-10-CM

## 2023-10-05 DIAGNOSIS — E11.69 TYPE 2 DIABETES MELLITUS WITH OTHER SPECIFIED COMPLICATION, UNSPECIFIED WHETHER LONG TERM INSULIN USE (MULTI): Primary | ICD-10-CM

## 2023-10-05 DIAGNOSIS — G47.30 SLEEP APNEA, UNSPECIFIED TYPE: ICD-10-CM

## 2023-10-05 DIAGNOSIS — Z23 NEED FOR INFLUENZA VACCINATION: ICD-10-CM

## 2023-10-05 DIAGNOSIS — I48.91 ATRIAL FIBRILLATION, UNSPECIFIED TYPE (MULTI): ICD-10-CM

## 2023-10-05 LAB
ALBUMIN SERPL BCP-MCNC: 4.9 G/DL (ref 3.4–5)
ANION GAP SERPL CALC-SCNC: 21 MMOL/L (ref 10–20)
BUN SERPL-MCNC: 18 MG/DL (ref 6–23)
CALCIUM SERPL-MCNC: 10 MG/DL (ref 8.6–10.6)
CHLORIDE SERPL-SCNC: 105 MMOL/L (ref 98–107)
CO2 SERPL-SCNC: 24 MMOL/L (ref 21–32)
CREAT SERPL-MCNC: 1.2 MG/DL (ref 0.5–1.3)
GFR SERPL CREATININE-BSD FRML MDRD: 64 ML/MIN/1.73M*2
GLUCOSE SERPL-MCNC: 85 MG/DL (ref 74–99)
PHOSPHATE SERPL-MCNC: 2.7 MG/DL (ref 2.5–4.9)
POC HEMOGLOBIN A1C: 5.4 % (ref 4.2–6.5)
POTASSIUM SERPL-SCNC: 4.2 MMOL/L (ref 3.5–5.3)
SODIUM SERPL-SCNC: 146 MMOL/L (ref 136–145)

## 2023-10-05 PROCEDURE — G0008 ADMIN INFLUENZA VIRUS VAC: HCPCS | Performed by: INTERNAL MEDICINE

## 2023-10-05 PROCEDURE — 3077F SYST BP >= 140 MM HG: CPT | Performed by: INTERNAL MEDICINE

## 2023-10-05 PROCEDURE — 3008F BODY MASS INDEX DOCD: CPT | Performed by: INTERNAL MEDICINE

## 2023-10-05 PROCEDURE — 1036F TOBACCO NON-USER: CPT | Performed by: INTERNAL MEDICINE

## 2023-10-05 PROCEDURE — 99213 OFFICE O/P EST LOW 20 MIN: CPT | Performed by: INTERNAL MEDICINE

## 2023-10-05 PROCEDURE — 83036 HEMOGLOBIN GLYCOSYLATED A1C: CPT | Performed by: INTERNAL MEDICINE

## 2023-10-05 PROCEDURE — 3079F DIAST BP 80-89 MM HG: CPT | Performed by: INTERNAL MEDICINE

## 2023-10-05 PROCEDURE — 36415 COLL VENOUS BLD VENIPUNCTURE: CPT

## 2023-10-05 PROCEDURE — 90662 IIV NO PRSV INCREASED AG IM: CPT | Performed by: INTERNAL MEDICINE

## 2023-10-05 PROCEDURE — 1160F RVW MEDS BY RX/DR IN RCRD: CPT | Performed by: INTERNAL MEDICINE

## 2023-10-05 PROCEDURE — 4010F ACE/ARB THERAPY RXD/TAKEN: CPT | Performed by: INTERNAL MEDICINE

## 2023-10-05 PROCEDURE — 80069 RENAL FUNCTION PANEL: CPT

## 2023-10-05 PROCEDURE — 1159F MED LIST DOCD IN RCRD: CPT | Performed by: INTERNAL MEDICINE

## 2023-10-05 RX ORDER — LISINOPRIL 20 MG/1
20 TABLET ORAL DAILY
Qty: 90 TABLET | Refills: 3 | Status: SHIPPED | OUTPATIENT
Start: 2023-10-05 | End: 2024-06-03

## 2023-10-05 SDOH — HEALTH STABILITY: PHYSICAL HEALTH: ON AVERAGE, HOW MANY MINUTES DO YOU ENGAGE IN EXERCISE AT THIS LEVEL?: 30 MIN

## 2023-10-05 SDOH — HEALTH STABILITY: PHYSICAL HEALTH: ON AVERAGE, HOW MANY DAYS PER WEEK DO YOU ENGAGE IN MODERATE TO STRENUOUS EXERCISE (LIKE A BRISK WALK)?: 1 DAY

## 2023-10-05 ASSESSMENT — ENCOUNTER SYMPTOMS
FACIAL SWELLING: 0
RHINORRHEA: 0
DIARRHEA: 0
HEADACHES: 0
LIGHT-HEADEDNESS: 0
FATIGUE: 0
POLYPHAGIA: 0
CONSTIPATION: 0
ANAL BLEEDING: 0
ARTHRALGIAS: 1
STRIDOR: 0
SINUS PRESSURE: 0
TREMORS: 0
VOMITING: 0
CHOKING: 0
NAUSEA: 0
DIAPHORESIS: 0
COUGH: 0
FREQUENCY: 0
EYE ITCHING: 0
SLEEP DISTURBANCE: 0
DIFFICULTY URINATING: 0
POLYDIPSIA: 0
EYE PAIN: 0
EYE DISCHARGE: 0
SEIZURES: 0
FLANK PAIN: 0
BLOOD IN STOOL: 0
HEMATURIA: 0
APPETITE CHANGE: 0
CHILLS: 0
SORE THROAT: 0
ABDOMINAL PAIN: 0
BACK PAIN: 0
ABDOMINAL DISTENTION: 0
SPEECH DIFFICULTY: 0
VOICE CHANGE: 0
SINUS PAIN: 0
MYALGIAS: 0
SHORTNESS OF BREATH: 0
PALPITATIONS: 0
COLOR CHANGE: 0
TROUBLE SWALLOWING: 0
DYSURIA: 0
NUMBNESS: 0
DIZZINESS: 0
EYE REDNESS: 0
JOINT SWELLING: 0
FACIAL ASYMMETRY: 0
ACTIVITY CHANGE: 0
WHEEZING: 0
RECTAL PAIN: 0
PHOTOPHOBIA: 0
WEAKNESS: 0
CHEST TIGHTNESS: 0
ADENOPATHY: 0
NECK STIFFNESS: 0
BRUISES/BLEEDS EASILY: 0
NECK PAIN: 0
WOUND: 0

## 2023-10-05 ASSESSMENT — LIFESTYLE VARIABLES
HOW MANY STANDARD DRINKS CONTAINING ALCOHOL DO YOU HAVE ON A TYPICAL DAY: 1 OR 2
HOW OFTEN DO YOU HAVE A DRINK CONTAINING ALCOHOL: 4 OR MORE TIMES A WEEK
HOW OFTEN DO YOU HAVE SIX OR MORE DRINKS ON ONE OCCASION: NEVER
SKIP TO QUESTIONS 9-10: 1
AUDIT-C TOTAL SCORE: 4

## 2023-10-05 NOTE — PROGRESS NOTES
Subjective   Patient ID: Elver Mc is a 73 y.o. male who presents for No chief complaint on file..    Patient presents for follow-up.  He has been compliant with his medications, diet but not exercise.  He is not eating as much.  He is purposely maintaining his weight.  He overall feels well.  Denies any headaches, no dizziness, no chest pain or shortness of breath.  As abdominal pain no nausea vomiting or diarrhea.  Reports baseline arthritis symptoms.  He is using his CPAP nightly.         Review of Systems   Constitutional:  Negative for activity change, appetite change, chills, diaphoresis and fatigue.   HENT:  Negative for congestion, dental problem, drooling, ear discharge, ear pain, facial swelling, hearing loss, mouth sores, nosebleeds, postnasal drip, rhinorrhea, sinus pressure, sinus pain, sneezing, sore throat, tinnitus, trouble swallowing and voice change.    Eyes:  Negative for photophobia, pain, discharge, redness, itching and visual disturbance.   Respiratory:  Negative for cough, choking, chest tightness, shortness of breath, wheezing and stridor.    Cardiovascular:  Negative for chest pain, palpitations and leg swelling.   Gastrointestinal:  Negative for abdominal distention, abdominal pain, anal bleeding, blood in stool, constipation, diarrhea, nausea, rectal pain and vomiting.   Endocrine: Negative for cold intolerance, heat intolerance, polydipsia, polyphagia and polyuria.   Genitourinary:  Negative for decreased urine volume, difficulty urinating, dysuria, enuresis, flank pain, frequency, genital sores, hematuria and urgency.   Musculoskeletal:  Positive for arthralgias. Negative for back pain, gait problem, joint swelling, myalgias, neck pain and neck stiffness.   Skin:  Negative for color change, pallor, rash and wound.   Neurological:  Negative for dizziness, tremors, seizures, syncope, facial asymmetry, speech difficulty, weakness, light-headedness, numbness and headaches.    Hematological:  Negative for adenopathy. Does not bruise/bleed easily.   Psychiatric/Behavioral:  Negative for sleep disturbance.        Objective   Temp 36.7 °C (98 °F)   Wt 110 kg (243 lb)   BMI 36.95 kg/m²     Physical Exam  Constitutional:       Appearance: Normal appearance.   Cardiovascular:      Rate and Rhythm: Normal rate. Rhythm irregular.      Heart sounds: No murmur heard.     No gallop.   Pulmonary:      Effort: No respiratory distress.      Breath sounds: No wheezing or rales.   Abdominal:      General: There is no distension.      Palpations: There is no mass.      Tenderness: There is no abdominal tenderness. There is no guarding.   Musculoskeletal:      Right lower leg: No edema.      Left lower leg: No edema.   Neurological:      Mental Status: He is alert.         Assessment/Plan   Diagnoses and all orders for this visit:  Type 2 diabetes mellitus with other specified complication, unspecified whether long term insulin use (CMS/HCC)-hemoglobin A1c was 5.4.  Ophthalmology appointment has been done  -     POCT glycosylated hemoglobin (Hb A1C) manually resulted  Need for influenza vaccination  -     Flu vaccine, quadrivalent, high-dose, preservative free, age 65y+ (FLUZONE)  Benign essential hypertension-we will increase lisinopril to 20 mg daily.  Follow low-salt diet and exercise  -     lisinopril 20 mg tablet; Take 1 tablet (20 mg) by mouth once daily.  Atrial fibrillation, unspecified type (CMS/HCC)-rate is controlled.  Follow-up with cardiology  Mixed hyperlipidemia-we will continue with atorvastatin  Severe obesity (BMI 35.0-35.9 with comorbidity) (CMS/HCC)-patient congratulated on his weight loss  Chronic renal impairment, stage 2 (mild)-recheck creatinine  Prostate cancer (CMS/HCC)-follow-up with urology  Cerebrovascular accident (CVA), unspecified mechanism (CMS/HCC)-modify risk factors  Sleep apnea, unspecified type-use CPAP nightly.  Health maintenance-colonoscopy next year.  We will  give a flu shot today.

## 2023-10-16 ENCOUNTER — ANTICOAGULATION - WARFARIN VISIT (OUTPATIENT)
Dept: CARDIOLOGY | Facility: CLINIC | Age: 73
End: 2023-10-16
Payer: MEDICARE

## 2023-10-16 DIAGNOSIS — Z79.01 LONG TERM (CURRENT) USE OF ANTICOAGULANTS: ICD-10-CM

## 2023-10-16 DIAGNOSIS — I48.92 ATRIAL FLUTTER, UNSPECIFIED TYPE (MULTI): ICD-10-CM

## 2023-10-16 DIAGNOSIS — I48.91 ATRIAL FIBRILLATION, UNSPECIFIED TYPE (MULTI): Primary | ICD-10-CM

## 2023-10-16 LAB
POC INR: 2.7
POC PROTHROMBIN TIME: NORMAL

## 2023-10-16 PROCEDURE — 99211 OFF/OP EST MAY X REQ PHY/QHP: CPT | Performed by: INTERNAL MEDICINE

## 2023-10-16 PROCEDURE — 85610 PROTHROMBIN TIME: CPT | Mod: QW

## 2023-10-16 NOTE — PROGRESS NOTES
Patient identification verified with 2 identifiers.    Location: [facility] - [address] [address] [place], [place] [address] [phone]     Referring Physician: DR. ESCALANTE  Enrollment/ Re-enrollment date: Enrollment/Re-enrollment date: 2024  INR Goal: 2.0-3.0  INR monitoring is per AMS protocol.  Anticoagulation Medication: warfarin 10 MG  Indication: atrial fibrillation    Subjective   Bleeding signs/symptoms: No    Bruising: No   Major bleeding event: No  Thrombosis signs/symptoms: No  Thromboembolic event: No  Missed doses: No  Extra doses: No  Medication changes: No  Dietary changes: No  Change in health: No  Change in activity: No  Alcohol: No  Other concerns: No    Upcoming Surgeries:  Does the Patient Have any upcoming surgeries that require interruption in anticoagulation therapy? no  Does the patient require bridging? no      Anticoagulation Summary  As of 10/16/2023      INR goal:  2.0-3.0   TTR:  100.0 % (2.6 wk)   INR used for dosin.70 (10/16/2023)   Weekly warfarin total:  50 mg               Assessment/Plan   Therapeutic     1. New dose: no change    2. Next INR: 1 month      Education provided to patient during the visit:  Patient instructed to call in interim with questions, concerns and changes.   Patient educated on compliance with dosing, follow up appointments, and prescribed plan of care.

## 2023-11-03 DIAGNOSIS — I10 BENIGN ESSENTIAL HYPERTENSION: Primary | ICD-10-CM

## 2023-11-06 RX ORDER — DILTIAZEM HYDROCHLORIDE 240 MG/1
240 CAPSULE, COATED, EXTENDED RELEASE ORAL DAILY
Qty: 100 CAPSULE | Refills: 2 | Status: SHIPPED | OUTPATIENT
Start: 2023-11-06

## 2023-11-06 NOTE — TELEPHONE ENCOUNTER
[id number]   Detail Level: Simple Continue Regimen: OTC Hydrocortisone cream QD for 1 week on, 1 week off, prn

## 2023-11-13 ENCOUNTER — ANTICOAGULATION - WARFARIN VISIT (OUTPATIENT)
Dept: CARDIOLOGY | Facility: CLINIC | Age: 73
End: 2023-11-13
Payer: MEDICARE

## 2023-11-13 DIAGNOSIS — Z79.01 LONG TERM (CURRENT) USE OF ANTICOAGULANTS: Primary | ICD-10-CM

## 2023-11-13 LAB
POC INR: 2.4
POC PROTHROMBIN TIME: NORMAL

## 2023-11-13 PROCEDURE — 85610 PROTHROMBIN TIME: CPT | Mod: QW

## 2023-11-13 PROCEDURE — 99211 OFF/OP EST MAY X REQ PHY/QHP: CPT | Performed by: INTERNAL MEDICINE

## 2023-11-13 NOTE — PROGRESS NOTES
Patient identification verified with 2 identifiers.    Location: [facility] - [address] [address] [place], [place] [address] [phone]     Referring Physician: DR. ESCALANTE  Enrollment/ Re-enrollment date: Enrollment/Re-enrollment date: 2024  INR Goal: 2.0-3.0  INR monitoring is per AMS protocol.  Anticoagulation Medication: warfarin 10 MG  Indication: atrial fibrillation    Subjective   Bleeding signs/symptoms: No    Bruising: No   Major bleeding event: No  Thrombosis signs/symptoms: No  Thromboembolic event: No  Missed doses: No  Extra doses: No  Medication changes: No  Dietary changes: No  Change in health: No  Change in activity: No  Alcohol: No  Other concerns: No    Upcoming Surgeries:  Does the Patient Have any upcoming surgeries that require interruption in anticoagulation therapy? no  Does the patient require bridging? no      Anticoagulation Summary  As of 2023      INR goal:  2.0-3.0   TTR:  100.0 % (1.5 mo)   INR used for dosin.40 (2023)   Weekly warfarin total:  50 mg               Assessment/Plan   Therapeutic     1. New dose: no change    2. Next INR: 1 month      Education provided to patient during the visit:  Patient instructed to call in interim with questions, concerns and changes.   Patient educated on interactions between medications and warfarin.   Patient educated on compliance with dosing, follow up appointments, and prescribed plan of care.

## 2023-11-15 ENCOUNTER — OFFICE VISIT (OUTPATIENT)
Dept: PRIMARY CARE | Facility: CLINIC | Age: 73
End: 2023-11-15
Payer: MEDICARE

## 2023-11-15 VITALS
BODY MASS INDEX: 35.94 KG/M2 | SYSTOLIC BLOOD PRESSURE: 134 MMHG | WEIGHT: 236.4 LBS | HEART RATE: 72 BPM | DIASTOLIC BLOOD PRESSURE: 80 MMHG

## 2023-11-15 DIAGNOSIS — I48.91 ATRIAL FIBRILLATION, UNSPECIFIED TYPE (MULTI): ICD-10-CM

## 2023-11-15 DIAGNOSIS — I42.9 CARDIOMYOPATHY, UNSPECIFIED TYPE (MULTI): ICD-10-CM

## 2023-11-15 DIAGNOSIS — I10 BENIGN ESSENTIAL HYPERTENSION: ICD-10-CM

## 2023-11-15 DIAGNOSIS — I99.8 ANGIECTASIA: ICD-10-CM

## 2023-11-15 DIAGNOSIS — C61 PROSTATE CANCER (MULTI): ICD-10-CM

## 2023-11-15 DIAGNOSIS — I63.9 CEREBROVASCULAR ACCIDENT (CVA), UNSPECIFIED MECHANISM (MULTI): ICD-10-CM

## 2023-11-15 DIAGNOSIS — N18.31 CHRONIC KIDNEY DISEASE, STAGE 3A (MULTI): ICD-10-CM

## 2023-11-15 DIAGNOSIS — E11.69 TYPE 2 DIABETES MELLITUS WITH OTHER SPECIFIED COMPLICATION, WITHOUT LONG-TERM CURRENT USE OF INSULIN (MULTI): ICD-10-CM

## 2023-11-15 DIAGNOSIS — E78.2 MIXED HYPERLIPIDEMIA: Primary | ICD-10-CM

## 2023-11-15 PROCEDURE — 1036F TOBACCO NON-USER: CPT | Performed by: INTERNAL MEDICINE

## 2023-11-15 PROCEDURE — 3008F BODY MASS INDEX DOCD: CPT | Performed by: INTERNAL MEDICINE

## 2023-11-15 PROCEDURE — 1160F RVW MEDS BY RX/DR IN RCRD: CPT | Performed by: INTERNAL MEDICINE

## 2023-11-15 PROCEDURE — 3079F DIAST BP 80-89 MM HG: CPT | Performed by: INTERNAL MEDICINE

## 2023-11-15 PROCEDURE — 1126F AMNT PAIN NOTED NONE PRSNT: CPT | Performed by: INTERNAL MEDICINE

## 2023-11-15 PROCEDURE — 3075F SYST BP GE 130 - 139MM HG: CPT | Performed by: INTERNAL MEDICINE

## 2023-11-15 PROCEDURE — 4010F ACE/ARB THERAPY RXD/TAKEN: CPT | Performed by: INTERNAL MEDICINE

## 2023-11-15 PROCEDURE — 99213 OFFICE O/P EST LOW 20 MIN: CPT | Performed by: INTERNAL MEDICINE

## 2023-11-15 PROCEDURE — 1159F MED LIST DOCD IN RCRD: CPT | Performed by: INTERNAL MEDICINE

## 2023-11-15 ASSESSMENT — ENCOUNTER SYMPTOMS
PHOTOPHOBIA: 0
DYSURIA: 0
VOMITING: 0
FLANK PAIN: 0
VOICE CHANGE: 0
HEADACHES: 0
WEAKNESS: 0
ABDOMINAL PAIN: 0
NAUSEA: 0
SEIZURES: 0
DIARRHEA: 0
ANAL BLEEDING: 0
POLYDIPSIA: 0
RECTAL PAIN: 0
SINUS PAIN: 0
EYE ITCHING: 0
ADENOPATHY: 0
CONSTIPATION: 0
JOINT SWELLING: 0
EYE PAIN: 0
CHOKING: 0
WOUND: 0
DIFFICULTY URINATING: 0
BACK PAIN: 0
RHINORRHEA: 0
CHILLS: 0
SHORTNESS OF BREATH: 0
FACIAL ASYMMETRY: 0
NECK PAIN: 0
TROUBLE SWALLOWING: 0
ARTHRALGIAS: 0
MYALGIAS: 0
STRIDOR: 0
POLYPHAGIA: 0
SLEEP DISTURBANCE: 0
NUMBNESS: 0
BRUISES/BLEEDS EASILY: 0
NECK STIFFNESS: 0
FACIAL SWELLING: 0
EYE DISCHARGE: 0
BLOOD IN STOOL: 0
COLOR CHANGE: 0
ABDOMINAL DISTENTION: 0
ACTIVITY CHANGE: 0
WHEEZING: 0
HEMATURIA: 0
SINUS PRESSURE: 0
DIAPHORESIS: 0
APPETITE CHANGE: 0
SORE THROAT: 0
FREQUENCY: 0
CHEST TIGHTNESS: 0
PALPITATIONS: 0
EYE REDNESS: 0
LIGHT-HEADEDNESS: 0
FATIGUE: 0
SPEECH DIFFICULTY: 0
DIZZINESS: 0
COUGH: 0
TREMORS: 0

## 2023-11-15 ASSESSMENT — PATIENT HEALTH QUESTIONNAIRE - PHQ9
2. FEELING DOWN, DEPRESSED OR HOPELESS: NOT AT ALL
SUM OF ALL RESPONSES TO PHQ9 QUESTIONS 1 AND 2: 0
1. LITTLE INTEREST OR PLEASURE IN DOING THINGS: NOT AT ALL

## 2023-11-15 ASSESSMENT — PAIN SCALES - GENERAL: PAINLEVEL: 0-NO PAIN

## 2023-11-15 NOTE — PROGRESS NOTES
Subjective   Patient ID: Elver Mc is a 73 y.o. male who presents for Follow-up (diabetes).    Patient presents for follow-up.  He has been compliant with his medications, diet but not exercise.  He overall feels great.  He denies any headaches, no dizziness, no chest pain or shortness of breath.  He denies abdominal pain no nausea vomiting or diarrhea.  He reports no pain.         Review of Systems   Constitutional:  Negative for activity change, appetite change, chills, diaphoresis and fatigue.   HENT:  Negative for congestion, dental problem, drooling, ear discharge, ear pain, facial swelling, hearing loss, mouth sores, nosebleeds, postnasal drip, rhinorrhea, sinus pressure, sinus pain, sneezing, sore throat, tinnitus, trouble swallowing and voice change.    Eyes:  Negative for photophobia, pain, discharge, redness, itching and visual disturbance.   Respiratory:  Negative for cough, choking, chest tightness, shortness of breath, wheezing and stridor.    Cardiovascular:  Negative for chest pain, palpitations and leg swelling.   Gastrointestinal:  Negative for abdominal distention, abdominal pain, anal bleeding, blood in stool, constipation, diarrhea, nausea, rectal pain and vomiting.   Endocrine: Negative for cold intolerance, heat intolerance, polydipsia, polyphagia and polyuria.   Genitourinary:  Negative for decreased urine volume, difficulty urinating, dysuria, enuresis, flank pain, frequency, genital sores, hematuria and urgency.   Musculoskeletal:  Negative for arthralgias, back pain, gait problem, joint swelling, myalgias, neck pain and neck stiffness.   Skin:  Negative for color change, pallor, rash and wound.   Neurological:  Negative for dizziness, tremors, seizures, syncope, facial asymmetry, speech difficulty, weakness, light-headedness, numbness and headaches.   Hematological:  Negative for adenopathy. Does not bruise/bleed easily.   Psychiatric/Behavioral:  Negative for sleep disturbance.         Objective   /80   Pulse 72   Wt 107 kg (236 lb 6.4 oz)   BMI 35.94 kg/m²     Physical Exam  Constitutional:       Appearance: Normal appearance.   Cardiovascular:      Rate and Rhythm: Normal rate and regular rhythm.      Heart sounds: No murmur heard.     No gallop.   Pulmonary:      Effort: No respiratory distress.      Breath sounds: No wheezing or rales.   Abdominal:      General: There is no distension.      Palpations: There is no mass.      Tenderness: There is no abdominal tenderness. There is no guarding.   Musculoskeletal:      Right lower leg: No edema.      Left lower leg: No edema.   Neurological:      Mental Status: He is alert.         Assessment/Plan   Diagnoses and all orders for this visit:  Mixed hyperlipidemia-we will continue with atorvastatin  Type 2 diabetes mellitus with other specified complication, without long-term current use of insulin (CMS/HCC)-we will continue with present management  -     POCT glycosylated hemoglobin (Hb A1C) manually resulted  -     POCT Fingerstick Glucose manually resulted  Cardiomyopathy, unspecified type (CMS/HCC)-follow-up with cardiology  Benign essential hypertension-stable on present medication  Angiectasia  Atrial fibrillation, unspecified type (CMS/HCC)-rate is controlled.  We will continue with anticoagulation  Chronic kidney disease, stage 3a (CMS/HCC)-recheck creatinine at next visit  Prostate cancer (CMS/HCC)-follow-up with urology  Cerebrovascular accident (CVA), unspecified mechanism (CMS/HCC)-we will modify risk factors.  Weight loss-purposeful per patient.  We will continue with diet.  Health maintenance-colonoscopy has been done.  Immunizations are up-to-date

## 2023-12-07 DIAGNOSIS — I10 BENIGN ESSENTIAL HYPERTENSION: Primary | ICD-10-CM

## 2023-12-07 RX ORDER — TRIAMTERENE AND HYDROCHLOROTHIAZIDE 75; 50 MG/1; MG/1
1 TABLET ORAL DAILY
Qty: 100 TABLET | Refills: 2 | Status: SHIPPED | OUTPATIENT
Start: 2023-12-07

## 2023-12-11 ENCOUNTER — ANTICOAGULATION - WARFARIN VISIT (OUTPATIENT)
Dept: CARDIOLOGY | Facility: CLINIC | Age: 73
End: 2023-12-11
Payer: MEDICARE

## 2023-12-11 DIAGNOSIS — Z79.01 LONG TERM (CURRENT) USE OF ANTICOAGULANTS: Primary | ICD-10-CM

## 2023-12-11 LAB
POC INR: 2.1
POC PROTHROMBIN TIME: NORMAL

## 2023-12-11 PROCEDURE — 99211 OFF/OP EST MAY X REQ PHY/QHP: CPT | Mod: 27 | Performed by: INTERNAL MEDICINE

## 2023-12-11 PROCEDURE — 85610 PROTHROMBIN TIME: CPT | Mod: QW

## 2023-12-11 NOTE — PROGRESS NOTES
Patient identification verified with 2 identifiers.    Location: [facility] - [address] [address] [place], [place] [address] [phone]     Referring Physician: DR. ESCALANTE  Enrollment/ Re-enrollment date: Enrollment/Re-enrollment date: 2024  INR Goal: 2.0-3.0  INR monitoring is per AMS protocol.  Anticoagulation Medication: warfarin 10 MG  Indication: atrial fibrillation    Subjective   Bleeding signs/symptoms: No    Bruising: No   Major bleeding event: No  Thrombosis signs/symptoms: No  Thromboembolic event: No  Missed doses: No  Extra doses: No  Medication changes: No  Dietary changes: No  Change in health: No  Change in activity: No  Alcohol: No  Other concerns: No    Upcoming Surgeries:  Does the Patient Have any upcoming surgeries that require interruption in anticoagulation therapy? no  Does the patient require bridging? no      Anticoagulation Summary  As of 2023      INR goal:  2.0-3.0   TTR:  100.0 % (2.5 mo)   INR used for dosin.10 (2023)   Weekly warfarin total:  50 mg               Assessment/Plan   Therapeutic     1. New dose: no change    2. Next INR: 1 month      Education provided to patient during the visit:  Patient instructed to call in interim with questions, concerns and changes.   Patient educated on interactions between medications and warfarin.   Patient educated on compliance with dosing, follow up appointments, and prescribed plan of care.

## 2023-12-29 ENCOUNTER — TELEPHONE (OUTPATIENT)
Dept: PRIMARY CARE | Facility: CLINIC | Age: 73
End: 2023-12-29

## 2023-12-29 NOTE — TELEPHONE ENCOUNTER
Nohemi states that patient has been forgetting things especially places that he has been many times.  It has been going on for a minute.  Can you call Javier (son)   to get more, who has been living with him to get more information...[phone]

## 2024-01-08 ENCOUNTER — ANTICOAGULATION - WARFARIN VISIT (OUTPATIENT)
Dept: CARDIOLOGY | Facility: CLINIC | Age: 74
End: 2024-01-08
Payer: MEDICARE

## 2024-01-08 DIAGNOSIS — Z79.01 LONG TERM (CURRENT) USE OF ANTICOAGULANTS: Primary | ICD-10-CM

## 2024-01-08 LAB
POC INR: 1.1
POC PROTHROMBIN TIME: NORMAL

## 2024-01-08 PROCEDURE — 99211 OFF/OP EST MAY X REQ PHY/QHP: CPT | Performed by: INTERNAL MEDICINE

## 2024-01-08 PROCEDURE — 85610 PROTHROMBIN TIME: CPT | Mod: QW

## 2024-01-08 NOTE — PROGRESS NOTES
Patient identification verified with 2 identifiers.    Location: [facility] - [address] [address] [facility]. [place], [place] [address] [phone]     Referring Physician: DR. ESCALANTE  Enrollment/ Re-enrollment date: Enrollment/Re-enrollment date: 2024  INR Goal: 2.0-3.0  INR monitoring is per AMS protocol.  Anticoagulation Medication: warfarin 10 MG  Indication: atrial fibrillation  Subjective   Bleeding signs/symptoms: No    Bruising: No   Major bleeding event: No  Thrombosis signs/symptoms: No  Thromboembolic event: No  Missed doses: Yes  Extra doses: No  Medication changes: No  Dietary changes: No  Change in health: No  Change in activity: No  Alcohol: No  Other concerns: No    Upcoming Surgeries:  Does the Patient Have any upcoming surgeries that require interruption in anticoagulation therapy? no  Does the patient require bridging? no      Anticoagulation Summary  As of 2024      INR goal:  2.0-3.0   TTR:  75.4 % (3.4 mo)   INR used for dosin.10 (2024)   Weekly warfarin total:  50 mg               Assessment/Plan   Subtherapeutic     1. New dose: WILL MAINTAIN DOSING SINCE HE WAS THERAPEUTIC MONTHLY  2. Next INR: 1 week      Education provided to patient during the visit:  Patient instructed to call in interim with questions, concerns and changes.   Patient educated on interactions between medications and warfarin.   Patient educated on dietary consistency in vitamin k consumption.   Patient educated on signs of bleeding/clotting.   Patient educated on compliance with dosing, follow up appointments, and prescribed plan of care.

## 2024-01-15 ENCOUNTER — ANTICOAGULATION - WARFARIN VISIT (OUTPATIENT)
Dept: CARDIOLOGY | Facility: CLINIC | Age: 74
End: 2024-01-15
Payer: MEDICARE

## 2024-01-15 DIAGNOSIS — Z79.01 LONG TERM (CURRENT) USE OF ANTICOAGULANTS: Primary | ICD-10-CM

## 2024-01-15 LAB
POC INR: 1.6
POC PROTHROMBIN TIME: NORMAL

## 2024-01-15 PROCEDURE — 85610 PROTHROMBIN TIME: CPT | Mod: QW

## 2024-01-15 PROCEDURE — 99211 OFF/OP EST MAY X REQ PHY/QHP: CPT | Performed by: INTERNAL MEDICINE

## 2024-01-15 NOTE — PROGRESS NOTES
Patient identification verified with 2 identifiers.    Location: [facility] - [address] [address] [facility]. [place], [place] [address] [phone]     Referring Physician: DR. ESCALANTE  Enrollment/ Re-enrollment date: Enrollment/Re-enrollment date: 2024  INR Goal: 2.0-3.0  INR monitoring is per AMS protocol.  Anticoagulation Medication: warfarin 10 MG  Indication: atrial fibrillation    Subjective   Bleeding signs/symptoms: No    Bruising: No   Major bleeding event: No  Thrombosis signs/symptoms: No  Thromboembolic event: No  Missed doses: No  Extra doses: No  Medication changes: No  Dietary changes: No  Change in health: No  Change in activity: No  Alcohol: No  Other concerns: No    Upcoming Surgeries:  Does the Patient Have any upcoming surgeries that require interruption in anticoagulation therapy? no  Does the patient require bridging? no      Anticoagulation Summary  As of 1/15/2024      INR goal:  2.0-3.0   TTR:  70.6 % (3.6 mo)   INR used for dosin.60 (1/15/2024)   Weekly warfarin total:  55 mg               Assessment/Plan   Subtherapeutic     1. New dose: INCREASE PER PROTOCOL  2. Next INR: 1 week      Education provided to patient during the visit:  Patient instructed to call in interim with questions, concerns and changes.   Patient educated on interactions between medications and warfarin.   Patient educated on dietary consistency in vitamin k consumption.   Patient educated on signs of bleeding/clotting.   Patient educated on compliance with dosing, follow up appointments, and prescribed plan of care.

## 2024-01-22 ENCOUNTER — ANTICOAGULATION - WARFARIN VISIT (OUTPATIENT)
Dept: CARDIOLOGY | Facility: CLINIC | Age: 74
End: 2024-01-22
Payer: MEDICARE

## 2024-01-22 DIAGNOSIS — Z79.01 LONG TERM (CURRENT) USE OF ANTICOAGULANTS: Primary | ICD-10-CM

## 2024-01-22 LAB
POC INR: 1.6
POC PROTHROMBIN TIME: NORMAL

## 2024-01-22 PROCEDURE — 99211 OFF/OP EST MAY X REQ PHY/QHP: CPT | Performed by: INTERNAL MEDICINE

## 2024-01-22 PROCEDURE — 85610 PROTHROMBIN TIME: CPT | Mod: QW

## 2024-01-22 NOTE — PROGRESS NOTES
Patient identification verified with 2 identifiers.    Location: [facility] - [address] [address] [facility]. [place], [place] [address] [phone]     Referring Physician: DR. ESCALANTE  Enrollment/ Re-enrollment date: Enrollment/Re-enrollment date: 2024  INR Goal: 2.0-3.0  INR monitoring is per AMS protocol.  Anticoagulation Medication: warfarin 10 MG  Indication: atrial fibrillation    Subjective   Bleeding signs/symptoms: No    Bruising: No   Major bleeding event: No  Thrombosis signs/symptoms: No  Thromboembolic event: No  Missed doses: No  Extra doses: No  Medication changes: No  Dietary changes: No  Change in health: No  Change in activity: No  Alcohol: No  Other concerns: No    Upcoming Surgeries:  Does the Patient Have any upcoming surgeries that require interruption in anticoagulation therapy? no  Does the patient require bridging? no      Anticoagulation Summary  As of 2024      INR goal:  2.0-3.0   TTR:  66.3 % (3.9 mo)   INR used for dosin.60 (2024)   Weekly warfarin total:  60 mg               Assessment/Plan   Subtherapeutic     1. New dose: INCREASE PER PROTOCOL  2. Next INR: 1 week      Education provided to patient during the visit:  Patient instructed to call in interim with questions, concerns and changes.   Patient educated on interactions between medications and warfarin.   Patient educated on dietary consistency in vitamin k consumption.   Patient educated on signs of bleeding/clotting.   Patient educated on compliance with dosing, follow up appointments, and prescribed plan of care.

## 2024-01-25 ENCOUNTER — LAB (OUTPATIENT)
Dept: LAB | Facility: LAB | Age: 74
End: 2024-01-25
Payer: MEDICARE

## 2024-01-25 ENCOUNTER — OFFICE VISIT (OUTPATIENT)
Dept: PRIMARY CARE | Facility: CLINIC | Age: 74
End: 2024-01-25
Payer: MEDICARE

## 2024-01-25 VITALS
WEIGHT: 238.4 LBS | SYSTOLIC BLOOD PRESSURE: 136 MMHG | BODY MASS INDEX: 36.25 KG/M2 | HEART RATE: 80 BPM | DIASTOLIC BLOOD PRESSURE: 80 MMHG

## 2024-01-25 DIAGNOSIS — I10 BENIGN ESSENTIAL HYPERTENSION: ICD-10-CM

## 2024-01-25 DIAGNOSIS — N18.31 CHRONIC KIDNEY DISEASE, STAGE 3A (MULTI): ICD-10-CM

## 2024-01-25 DIAGNOSIS — E66.01 SEVERE OBESITY (BMI 35.0-35.9 WITH COMORBIDITY) (MULTI): ICD-10-CM

## 2024-01-25 DIAGNOSIS — I63.9 CEREBROVASCULAR ACCIDENT (CVA), UNSPECIFIED MECHANISM (MULTI): ICD-10-CM

## 2024-01-25 DIAGNOSIS — R41.3 MEMORY LOSS: Primary | ICD-10-CM

## 2024-01-25 DIAGNOSIS — C61 PROSTATE CANCER (MULTI): ICD-10-CM

## 2024-01-25 DIAGNOSIS — E11.9 TYPE 2 DIABETES MELLITUS WITHOUT COMPLICATIONS (MULTI): ICD-10-CM

## 2024-01-25 DIAGNOSIS — E11.69 TYPE 2 DIABETES MELLITUS WITH OTHER SPECIFIED COMPLICATION, WITHOUT LONG-TERM CURRENT USE OF INSULIN (MULTI): ICD-10-CM

## 2024-01-25 DIAGNOSIS — M79.643 PAIN OF HAND, UNSPECIFIED LATERALITY: ICD-10-CM

## 2024-01-25 DIAGNOSIS — I48.91 ATRIAL FIBRILLATION, UNSPECIFIED TYPE (MULTI): ICD-10-CM

## 2024-01-25 DIAGNOSIS — R41.3 MEMORY LOSS: ICD-10-CM

## 2024-01-25 DIAGNOSIS — I42.9 CARDIOMYOPATHY, UNSPECIFIED TYPE (MULTI): ICD-10-CM

## 2024-01-25 DIAGNOSIS — I48.92 ATRIAL FLUTTER, UNSPECIFIED TYPE (MULTI): ICD-10-CM

## 2024-01-25 LAB
ALBUMIN SERPL BCP-MCNC: 4.6 G/DL (ref 3.4–5)
ALP SERPL-CCNC: 67 U/L (ref 33–136)
ALT SERPL W P-5'-P-CCNC: 14 U/L (ref 10–52)
ANION GAP SERPL CALC-SCNC: 22 MMOL/L (ref 10–20)
APPEARANCE UR: CLEAR
AST SERPL W P-5'-P-CCNC: 16 U/L (ref 9–39)
BILIRUB SERPL-MCNC: 0.6 MG/DL (ref 0–1.2)
BILIRUB UR STRIP.AUTO-MCNC: NEGATIVE MG/DL
BUN SERPL-MCNC: 38 MG/DL (ref 6–23)
CALCIUM SERPL-MCNC: 10.1 MG/DL (ref 8.6–10.6)
CHLORIDE SERPL-SCNC: 102 MMOL/L (ref 98–107)
CO2 SERPL-SCNC: 18 MMOL/L (ref 21–32)
COLOR UR: YELLOW
CREAT SERPL-MCNC: 1.74 MG/DL (ref 0.5–1.3)
EGFRCR SERPLBLD CKD-EPI 2021: 41 ML/MIN/1.73M*2
EST. AVERAGE GLUCOSE BLD GHB EST-MCNC: 148 MG/DL
GLUCOSE SERPL-MCNC: 129 MG/DL (ref 74–99)
GLUCOSE UR STRIP.AUTO-MCNC: ABNORMAL MG/DL
HBA1C MFR BLD: 6.8 %
KETONES UR STRIP.AUTO-MCNC: NEGATIVE MG/DL
LEUKOCYTE ESTERASE UR QL STRIP.AUTO: NEGATIVE
NITRITE UR QL STRIP.AUTO: NEGATIVE
PH UR STRIP.AUTO: 5 [PH]
POTASSIUM SERPL-SCNC: 4.5 MMOL/L (ref 3.5–5.3)
PROT SERPL-MCNC: 7.7 G/DL (ref 6.4–8.2)
PROT UR STRIP.AUTO-MCNC: NEGATIVE MG/DL
RBC # UR STRIP.AUTO: NEGATIVE /UL
SODIUM SERPL-SCNC: 137 MMOL/L (ref 136–145)
SP GR UR STRIP.AUTO: 1.01
TREPONEMA PALLIDUM IGG+IGM AB [PRESENCE] IN SERUM OR PLASMA BY IMMUNOASSAY: NONREACTIVE
URATE SERPL-MCNC: 6.7 MG/DL (ref 4–7.5)
UROBILINOGEN UR STRIP.AUTO-MCNC: <2 MG/DL
VIT B12 SERPL-MCNC: 349 PG/ML (ref 211–911)

## 2024-01-25 PROCEDURE — 36415 COLL VENOUS BLD VENIPUNCTURE: CPT

## 2024-01-25 PROCEDURE — 1160F RVW MEDS BY RX/DR IN RCRD: CPT | Performed by: INTERNAL MEDICINE

## 2024-01-25 PROCEDURE — 84550 ASSAY OF BLOOD/URIC ACID: CPT

## 2024-01-25 PROCEDURE — 86780 TREPONEMA PALLIDUM: CPT

## 2024-01-25 PROCEDURE — 1159F MED LIST DOCD IN RCRD: CPT | Performed by: INTERNAL MEDICINE

## 2024-01-25 PROCEDURE — 3075F SYST BP GE 130 - 139MM HG: CPT | Performed by: INTERNAL MEDICINE

## 2024-01-25 PROCEDURE — 1170F FXNL STATUS ASSESSED: CPT | Performed by: INTERNAL MEDICINE

## 2024-01-25 PROCEDURE — 1036F TOBACCO NON-USER: CPT | Performed by: INTERNAL MEDICINE

## 2024-01-25 PROCEDURE — 3008F BODY MASS INDEX DOCD: CPT | Performed by: INTERNAL MEDICINE

## 2024-01-25 PROCEDURE — 4010F ACE/ARB THERAPY RXD/TAKEN: CPT | Performed by: INTERNAL MEDICINE

## 2024-01-25 PROCEDURE — 1126F AMNT PAIN NOTED NONE PRSNT: CPT | Performed by: INTERNAL MEDICINE

## 2024-01-25 PROCEDURE — 82607 VITAMIN B-12: CPT

## 2024-01-25 PROCEDURE — 3079F DIAST BP 80-89 MM HG: CPT | Performed by: INTERNAL MEDICINE

## 2024-01-25 PROCEDURE — 81003 URINALYSIS AUTO W/O SCOPE: CPT

## 2024-01-25 PROCEDURE — 99214 OFFICE O/P EST MOD 30 MIN: CPT | Performed by: INTERNAL MEDICINE

## 2024-01-25 PROCEDURE — 83036 HEMOGLOBIN GLYCOSYLATED A1C: CPT

## 2024-01-25 PROCEDURE — 80053 COMPREHEN METABOLIC PANEL: CPT

## 2024-01-25 RX ORDER — DAPAGLIFLOZIN 10 MG/1
10 TABLET, FILM COATED ORAL DAILY
Qty: 30 TABLET | Refills: 5 | Status: SHIPPED | OUTPATIENT
Start: 2024-01-25 | End: 2024-03-22 | Stop reason: SDUPTHER

## 2024-01-25 RX ORDER — WARFARIN 10 MG/1
TABLET ORAL
Qty: 90 TABLET | Refills: 3 | Status: SHIPPED | OUTPATIENT
Start: 2024-01-25

## 2024-01-25 ASSESSMENT — ENCOUNTER SYMPTOMS
RECTAL PAIN: 0
HEADACHES: 0
SHORTNESS OF BREATH: 0
CONSTIPATION: 0
FACIAL ASYMMETRY: 0
BACK PAIN: 0
SINUS PRESSURE: 0
ABDOMINAL DISTENTION: 0
CONFUSION: 1
BRUISES/BLEEDS EASILY: 0
EYE ITCHING: 0
EYE DISCHARGE: 0
NUMBNESS: 0
NAUSEA: 0
WHEEZING: 0
HEMATURIA: 0
NECK STIFFNESS: 0
PALPITATIONS: 0
APPETITE CHANGE: 0
CHEST TIGHTNESS: 0
ACTIVITY CHANGE: 0
STRIDOR: 0
MYALGIAS: 0
FATIGUE: 0
SORE THROAT: 0
BLOOD IN STOOL: 0
SEIZURES: 0
PHOTOPHOBIA: 0
VOICE CHANGE: 0
WEAKNESS: 0
SLEEP DISTURBANCE: 0
SPEECH DIFFICULTY: 0
ADENOPATHY: 0
ANAL BLEEDING: 0
VOMITING: 0
ABDOMINAL PAIN: 0
CHILLS: 0
POLYDIPSIA: 0
JOINT SWELLING: 0
DIARRHEA: 0
DIFFICULTY URINATING: 0
FACIAL SWELLING: 0
CHOKING: 0
EYE PAIN: 0
FLANK PAIN: 0
DIAPHORESIS: 0
NECK PAIN: 0
SINUS PAIN: 0
FREQUENCY: 0
DIZZINESS: 0
POLYPHAGIA: 0
TROUBLE SWALLOWING: 0
TREMORS: 0
RHINORRHEA: 0
COLOR CHANGE: 0
ARTHRALGIAS: 0
DYSURIA: 0
COUGH: 0
EYE REDNESS: 0
LIGHT-HEADEDNESS: 0
WOUND: 0

## 2024-01-25 ASSESSMENT — ACTIVITIES OF DAILY LIVING (ADL)
BATHING: INDEPENDENT
TAKING_MEDICATION: INDEPENDENT
DRESSING: INDEPENDENT
GROCERY_SHOPPING: INDEPENDENT
MANAGING_FINANCES: INDEPENDENT

## 2024-01-25 ASSESSMENT — PATIENT HEALTH QUESTIONNAIRE - PHQ9
1. LITTLE INTEREST OR PLEASURE IN DOING THINGS: NOT AT ALL
SUM OF ALL RESPONSES TO PHQ9 QUESTIONS 1 AND 2: 0
2. FEELING DOWN, DEPRESSED OR HOPELESS: NOT AT ALL

## 2024-01-25 ASSESSMENT — PAIN SCALES - GENERAL: PAINLEVEL: 0-NO PAIN

## 2024-01-25 NOTE — PROGRESS NOTES
Subjective   Patient ID: Elver Mc is a 73 y.o. male who presents for Follow-up (Memory concern) and Medicare Annual Wellness Visit Subsequent.    Patient presents for wellness exam and follow-up.  He has been compliant with his medications and diet but not exercise.  His family states that his memory is worsening.  He is forgetful and sometimes gets lost.  Patient does report that he has decreased memory.  His family has taken over paying his bills.  He overall feels well.  He denies any headaches, no dizziness, chest pain or shortness of breath.  He denies abdominal pain no nausea vomiting or diarrhea.  Reports no new musculoskeletal complaints.         Review of Systems   Constitutional:  Negative for activity change, appetite change, chills, diaphoresis and fatigue.   HENT:  Negative for congestion, dental problem, drooling, ear discharge, ear pain, facial swelling, hearing loss, mouth sores, nosebleeds, postnasal drip, rhinorrhea, sinus pressure, sinus pain, sneezing, sore throat, tinnitus, trouble swallowing and voice change.    Eyes:  Negative for photophobia, pain, discharge, redness, itching and visual disturbance.   Respiratory:  Negative for cough, choking, chest tightness, shortness of breath, wheezing and stridor.    Cardiovascular:  Negative for chest pain, palpitations and leg swelling.   Gastrointestinal:  Negative for abdominal distention, abdominal pain, anal bleeding, blood in stool, constipation, diarrhea, nausea, rectal pain and vomiting.   Endocrine: Negative for cold intolerance, heat intolerance, polydipsia, polyphagia and polyuria.   Genitourinary:  Negative for decreased urine volume, difficulty urinating, dysuria, enuresis, flank pain, frequency, genital sores, hematuria and urgency.   Musculoskeletal:  Negative for arthralgias, back pain, gait problem, joint swelling, myalgias, neck pain and neck stiffness.   Skin:  Negative for color change, pallor, rash and wound.   Neurological:   Negative for dizziness, tremors, seizures, syncope, facial asymmetry, speech difficulty, weakness, light-headedness, numbness and headaches.   Hematological:  Negative for adenopathy. Does not bruise/bleed easily.   Psychiatric/Behavioral:  Positive for confusion. Negative for sleep disturbance.        Objective   /80   Pulse 80   Wt 108 kg (238 lb 6.4 oz)   BMI 36.25 kg/m²     Physical Exam  Constitutional:       Appearance: Normal appearance.   Cardiovascular:      Rate and Rhythm: Normal rate and regular rhythm.      Heart sounds: No murmur heard.     No gallop.   Pulmonary:      Effort: No respiratory distress.      Breath sounds: No wheezing or rales.   Abdominal:      General: There is no distension.      Palpations: There is no mass.      Tenderness: There is no abdominal tenderness. There is no guarding.   Musculoskeletal:      Right lower leg: No edema.      Left lower leg: No edema.   Neurological:      Mental Status: He is alert.         Assessment/Plan   Diagnoses and all orders for this visit:  Memory loss-will refer to neurology for further evaluation.  Will check routine labs.  Will check an MRI.  -     Syphilis Screen with Reflex; Future  -     Vitamin B12; Future  -     MR brain wo IV contrast; Future  -     Referral to Neurology; Future  Type 2 diabetes mellitus without complications (CMS/HCC)-will check hemoglobin A1c.  -     dapagliflozin propanediol (Farxiga) 10 mg; Take 1 tablet (10 mg) by mouth once daily.  -     Comprehensive Metabolic Panel; Future  -     Uric Acid; Future  -     Hemoglobin A1c; Future  Atrial fibrillation, unspecified type (CMS/HCC)-rate is controlled.  Will continue with anticoagulation  -     warfarin (Coumadin) 10 mg tablet; Or as directed  Cardiomyopathy, unspecified type (CMS/HCC)-stable symptoms.  Follow-up with cardiology  Prostate cancer (CMS/HCC)-schedule follow-up with GI and oncology  Severe obesity (BMI 35.0-35.9 with comorbidity) (CMS/HCC)  Chronic  kidney disease, stage 3a (CMS/HCC)-recheck creatinine.  -     Urinalysis with Reflex Microscopic; Future  Type 2 diabetes mellitus with other specified complication, without long-term current use of insulin (CMS/HCC)  Benign essential hypertension-stable on present medications  Atrial flutter, unspecified type (CMS/HCC)-rate is controlled.  Will continue with anticoagulation  Pain of hand, unspecified laterality-stable symptoms  Cerebrovascular accident (CVA), unspecified mechanism (CMS/HCC)-we will modify risk factors.  Health maintenance-colonoscopy has been done.  Will get the Prevnar at next visit..  Get RSV vaccine at the pharmacy.  Family will work on getting power of .

## 2024-01-25 NOTE — PATIENT INSTRUCTIONS
Please obtain blood work and urine.  Schedule I with neurology.  Schedule appointment for your MRI.  Follow-up in 1 month.

## 2024-01-26 DIAGNOSIS — I10 BENIGN ESSENTIAL HYPERTENSION: Primary | ICD-10-CM

## 2024-01-29 ENCOUNTER — ANTICOAGULATION - WARFARIN VISIT (OUTPATIENT)
Dept: CARDIOLOGY | Facility: CLINIC | Age: 74
End: 2024-01-29
Payer: MEDICARE

## 2024-01-29 DIAGNOSIS — Z79.01 LONG TERM (CURRENT) USE OF ANTICOAGULANTS: ICD-10-CM

## 2024-01-29 DIAGNOSIS — I48.91 ATRIAL FIBRILLATION, UNSPECIFIED TYPE (MULTI): Primary | ICD-10-CM

## 2024-01-29 DIAGNOSIS — I48.92 ATRIAL FLUTTER, UNSPECIFIED TYPE (MULTI): ICD-10-CM

## 2024-01-29 LAB
POC INR: 1.8
POC PROTHROMBIN TIME: NORMAL

## 2024-01-29 PROCEDURE — 85610 PROTHROMBIN TIME: CPT | Mod: QW

## 2024-01-29 PROCEDURE — 99211 OFF/OP EST MAY X REQ PHY/QHP: CPT | Performed by: INTERNAL MEDICINE

## 2024-01-29 NOTE — PROGRESS NOTES
Patient identification verified with 2 identifiers.    Location: [facility] - [address] [address] [facility]. [place], [place] [address] [phone]     Referring Physician: DR. ESCALANTE  Enrollment/ Re-enrollment date: Enrollment/Re-enrollment date: 2024  INR Goal: 2.0-3.0  INR monitoring is per AMS protocol.  Anticoagulation Medication: warfarin 10 MG  Indication: atrial fibrillation    Subjective   Bleeding signs/symptoms: No    Bruising: No   Major bleeding event: No  Thrombosis signs/symptoms: No  Thromboembolic event: No  Missed doses: No  Extra doses: No  Medication changes: No  Dietary changes: No  Change in health: No  Change in activity: No  Alcohol: No  Other concerns: No    Upcoming Surgeries:  Does the Patient Have any upcoming surgeries that require interruption in anticoagulation therapy? no  Does the patient require bridging? no      Anticoagulation Summary  As of 2024      INR goal:  2.0-3.0   TTR:  62.6 % (4.1 mo)   INR used for dosin.80 (2024)   Weekly warfarin total:  65 mg               Assessment/Plan   Subtherapeutic     1. New dose: INCREASE PER PROTOCOL  2. Next INR: 1 week      Education provided to patient during the visit:  Patient instructed to call in interim with questions, concerns and changes.   Patient educated on interactions between medications and warfarin.   Patient educated on dietary consistency in vitamin k consumption.   Patient educated on signs of bleeding/clotting.   Patient educated on compliance with dosing, follow up appointments, and prescribed plan of care.

## 2024-02-03 DIAGNOSIS — E11.69 TYPE 2 DIABETES MELLITUS WITH OTHER SPECIFIED COMPLICATION, UNSPECIFIED WHETHER LONG TERM INSULIN USE (MULTI): ICD-10-CM

## 2024-02-05 ENCOUNTER — ANTICOAGULATION - WARFARIN VISIT (OUTPATIENT)
Dept: CARDIOLOGY | Facility: CLINIC | Age: 74
End: 2024-02-05
Payer: MEDICARE

## 2024-02-05 DIAGNOSIS — I48.92 ATRIAL FLUTTER, UNSPECIFIED TYPE (MULTI): ICD-10-CM

## 2024-02-05 DIAGNOSIS — Z79.01 LONG TERM (CURRENT) USE OF ANTICOAGULANTS: ICD-10-CM

## 2024-02-05 DIAGNOSIS — I48.91 ATRIAL FIBRILLATION, UNSPECIFIED TYPE (MULTI): Primary | ICD-10-CM

## 2024-02-05 LAB
POC INR: 1.7
POC PROTHROMBIN TIME: NORMAL

## 2024-02-05 PROCEDURE — 99211 OFF/OP EST MAY X REQ PHY/QHP: CPT | Performed by: INTERNAL MEDICINE

## 2024-02-05 PROCEDURE — 85610 PROTHROMBIN TIME: CPT | Mod: QW

## 2024-02-06 RX ORDER — BLOOD SUGAR DIAGNOSTIC
STRIP MISCELLANEOUS
Qty: 200 STRIP | Refills: 2 | Status: SHIPPED | OUTPATIENT
Start: 2024-02-06

## 2024-02-12 ENCOUNTER — ANTICOAGULATION - WARFARIN VISIT (OUTPATIENT)
Dept: CARDIOLOGY | Facility: CLINIC | Age: 74
End: 2024-02-12
Payer: MEDICARE

## 2024-02-12 DIAGNOSIS — I48.91 ATRIAL FIBRILLATION, UNSPECIFIED TYPE (MULTI): Primary | ICD-10-CM

## 2024-02-12 DIAGNOSIS — Z79.01 LONG TERM (CURRENT) USE OF ANTICOAGULANTS: ICD-10-CM

## 2024-02-12 DIAGNOSIS — I48.92 ATRIAL FLUTTER, UNSPECIFIED TYPE (MULTI): ICD-10-CM

## 2024-02-12 LAB
POC INR: 1.8 (ref 2–3)
POC PROTHROMBIN TIME: ABNORMAL

## 2024-02-12 PROCEDURE — 85610 PROTHROMBIN TIME: CPT | Mod: QW

## 2024-02-12 PROCEDURE — 99211 OFF/OP EST MAY X REQ PHY/QHP: CPT | Performed by: INTERNAL MEDICINE

## 2024-02-12 NOTE — PROGRESS NOTES
Patient identification verified with 2 identifiers.    Location: [facility] - [address] [address] [facility]. [place], [place] [address] [phone]     Referring Physician: DR. ESCALANTE  Enrollment/ Re-enrollment date: Enrollment/Re-enrollment date: 2024  INR Goal: 2.0-3.0  INR monitoring is per AMS protocol.  Anticoagulation Medication: warfarin 10 MG  Indication: atrial fibrillation    Subjective   Bleeding signs/symptoms: No    Bruising: No   Major bleeding event: No  Thrombosis signs/symptoms: No  Thromboembolic event: No  Missed doses: No  Extra doses: No  Medication changes: No  Dietary changes: No  Change in health: No  Change in activity: No  Alcohol: No  Other concerns: No    Upcoming Surgeries:  Does the Patient Have any upcoming surgeries that require interruption in anticoagulation therapy? no  Does the patient require bridging? no      Anticoagulation Summary  As of 2024      INR goal:  2.0-3.0   TTR:  56.3 % (4.6 mo)   INR used for dosin.80 (2024)   Weekly warfarin total:  80 mg               Assessment/Plan   Subtherapeutic     1. New dose:  Increased approximately 5% per protocol     2. Next INR: 1 week      Education provided to patient during the visit:  Patient instructed to call in interim with questions, concerns and changes.   Patient educated on interactions between medications and warfarin.   Patient educated on dietary consistency in vitamin k consumption.   Patient educated on signs of bleeding/clotting.   Patient educated on compliance with dosing, follow up appointments, and prescribed plan of care.

## 2024-02-13 ENCOUNTER — HOSPITAL ENCOUNTER (OUTPATIENT)
Dept: RADIOLOGY | Facility: CLINIC | Age: 74
Discharge: HOME | End: 2024-02-13
Payer: MEDICARE

## 2024-02-13 DIAGNOSIS — R41.3 MEMORY LOSS: ICD-10-CM

## 2024-02-13 PROCEDURE — 70551 MRI BRAIN STEM W/O DYE: CPT

## 2024-02-13 PROCEDURE — 70551 MRI BRAIN STEM W/O DYE: CPT | Performed by: STUDENT IN AN ORGANIZED HEALTH CARE EDUCATION/TRAINING PROGRAM

## 2024-02-19 ENCOUNTER — ANTICOAGULATION - WARFARIN VISIT (OUTPATIENT)
Dept: CARDIOLOGY | Facility: CLINIC | Age: 74
End: 2024-02-19
Payer: MEDICARE

## 2024-02-19 DIAGNOSIS — Z79.01 LONG TERM (CURRENT) USE OF ANTICOAGULANTS: ICD-10-CM

## 2024-02-19 DIAGNOSIS — I48.92 ATRIAL FLUTTER, UNSPECIFIED TYPE (MULTI): ICD-10-CM

## 2024-02-19 DIAGNOSIS — I48.91 ATRIAL FIBRILLATION, UNSPECIFIED TYPE (MULTI): Primary | ICD-10-CM

## 2024-02-19 LAB
POC INR: 2.7
POC PROTHROMBIN TIME: NORMAL

## 2024-02-19 PROCEDURE — 99211 OFF/OP EST MAY X REQ PHY/QHP: CPT | Performed by: INTERNAL MEDICINE

## 2024-02-19 PROCEDURE — 85610 PROTHROMBIN TIME: CPT | Mod: QW

## 2024-02-19 NOTE — PROGRESS NOTES
Patient identification verified with 2 identifiers.    Location: [facility] - [address] [address] [place], [place] [address] [phone]     Referring Physician: DR. ESCALANTE  Enrollment/ Re-enrollment date: Enrollment/Re-enrollment date: April 4, 2024  INR Goal: 2.0-3.0  INR monitoring is per AMS protocol.  Anticoagulation Medication: warfarin 10 MG  Indication: atrial fibrillation    Subjective   Bleeding signs/symptoms: No    Bruising: No   Major bleeding event: No  Thrombosis signs/symptoms: No  Thromboembolic event: No  Missed doses: No  Extra doses: No  Medication changes: No  Dietary changes: No  Change in health: No  Change in activity: No  Alcohol: No  Other concerns: No    Upcoming Procedures:  Does the Patient Have any upcoming procedures that require interruption in anticoagulation therapy? no  Does the patient require bridging? no      Anticoagulation Summary  As of 2/19/2024      INR goal:  2.0-3.0   TTR:  57.4 % (4.8 mo)   INR used for dosing:     Weekly warfarin total:  80 mg               Assessment/Plan   Therapeutic     1. New dose: no change    2. Next INR: 1 week      Education provided to patient during the visit:  Patient instructed to call in interim with questions, concerns and changes.   Patient educated on interactions between medications and warfarin.   Patient educated on dietary consistency in vitamin k consumption.   Patient educated on compliance with dosing, follow up appointments, and prescribed plan of care.

## 2024-02-21 ENCOUNTER — LAB (OUTPATIENT)
Dept: LAB | Facility: LAB | Age: 74
End: 2024-02-21
Payer: MEDICARE

## 2024-02-21 DIAGNOSIS — N18.31 CHRONIC KIDNEY DISEASE, STAGE 3A (MULTI): ICD-10-CM

## 2024-02-21 DIAGNOSIS — I10 BENIGN ESSENTIAL HYPERTENSION: ICD-10-CM

## 2024-02-21 DIAGNOSIS — N18.31 CHRONIC KIDNEY DISEASE, STAGE 3A (MULTI): Primary | ICD-10-CM

## 2024-02-21 LAB
ALBUMIN SERPL BCP-MCNC: 4.3 G/DL (ref 3.4–5)
ANION GAP SERPL CALC-SCNC: 14 MMOL/L (ref 10–20)
BUN SERPL-MCNC: 40 MG/DL (ref 6–23)
CALCIUM SERPL-MCNC: 9.6 MG/DL (ref 8.6–10.6)
CHLORIDE SERPL-SCNC: 103 MMOL/L (ref 98–107)
CO2 SERPL-SCNC: 24 MMOL/L (ref 21–32)
CREAT SERPL-MCNC: 1.99 MG/DL (ref 0.5–1.3)
EGFRCR SERPLBLD CKD-EPI 2021: 35 ML/MIN/1.73M*2
GLUCOSE SERPL-MCNC: 96 MG/DL (ref 74–99)
PHOSPHATE SERPL-MCNC: 3.1 MG/DL (ref 2.5–4.9)
POTASSIUM SERPL-SCNC: 5 MMOL/L (ref 3.5–5.3)
PROT SERPL-MCNC: 6.8 G/DL (ref 6.4–8.2)
SODIUM SERPL-SCNC: 136 MMOL/L (ref 136–145)

## 2024-02-21 PROCEDURE — 80069 RENAL FUNCTION PANEL: CPT

## 2024-02-21 PROCEDURE — 84165 PROTEIN E-PHORESIS SERUM: CPT

## 2024-02-21 PROCEDURE — 84165 PROTEIN E-PHORESIS SERUM: CPT | Performed by: INTERNAL MEDICINE

## 2024-02-21 PROCEDURE — 36415 COLL VENOUS BLD VENIPUNCTURE: CPT

## 2024-02-22 LAB
ALBUMIN: 4.1 G/DL (ref 3.4–5)
ALPHA 1 GLOBULIN: 0.2 G/DL (ref 0.2–0.6)
ALPHA 2 GLOBULIN: 0.7 G/DL (ref 0.4–1.1)
BETA GLOBULIN: 0.8 G/DL (ref 0.5–1.2)
GAMMA GLOBULIN: 0.9 G/DL (ref 0.5–1.4)
PATH REVIEW-SERUM PROTEIN ELECTROPHORESIS: NORMAL
PROTEIN ELECTROPHORESIS COMMENT: NORMAL

## 2024-02-26 ENCOUNTER — ANTICOAGULATION - WARFARIN VISIT (OUTPATIENT)
Dept: CARDIOLOGY | Facility: CLINIC | Age: 74
End: 2024-02-26
Payer: MEDICARE

## 2024-02-26 DIAGNOSIS — Z79.01 LONG TERM (CURRENT) USE OF ANTICOAGULANTS: ICD-10-CM

## 2024-02-26 DIAGNOSIS — I48.92 ATRIAL FLUTTER, UNSPECIFIED TYPE (MULTI): ICD-10-CM

## 2024-02-26 DIAGNOSIS — I48.91 ATRIAL FIBRILLATION, UNSPECIFIED TYPE (MULTI): Primary | ICD-10-CM

## 2024-02-26 LAB
POC INR: 2.7
POC PROTHROMBIN TIME: NORMAL

## 2024-02-26 PROCEDURE — 99211 OFF/OP EST MAY X REQ PHY/QHP: CPT

## 2024-02-26 PROCEDURE — 85610 PROTHROMBIN TIME: CPT | Mod: QW

## 2024-02-26 NOTE — PROGRESS NOTES
Patient identification verified with 2 identifiers.    Location: [facility] - [address] [address] [place], [place] [address] [phone]     Referring Physician: DR. ESCALANTE  Enrollment/ Re-enrollment date: Enrollment/Re-enrollment date: 2024  INR Goal: 2.0-3.0  INR monitoring is per AMS protocol.  Anticoagulation Medication: warfarin 10 MG  Indication: atrial fibrillation    Subjective   Bleeding signs/symptoms: No    Bruising: No   Major bleeding event: No  Thrombosis signs/symptoms: No  Thromboembolic event: No  Missed doses: No  Extra doses: No  Medication changes: No  Dietary changes: No  Change in health: No  Change in activity: No  Alcohol: No  Other concerns: No    Upcoming Procedures:  Does the Patient Have any upcoming procedures that require interruption in anticoagulation therapy? no  Does the patient require bridging? no      Anticoagulation Summary  As of 2024      INR goal:  2.0-3.0   TTR:  59.4 % (5 mo)   INR used for dosin.70 (2024)   Weekly warfarin total:  80 mg               Assessment/Plan   Therapeutic     1. New dose: no change    2. Next INR: 2 weeks      Education provided to patient during the visit:  Patient instructed to call in interim with questions, concerns and changes.   Patient educated on interactions between medications and warfarin.   Patient educated on compliance with dosing, follow up appointments, and prescribed plan of care.

## 2024-02-27 DIAGNOSIS — N18.31 CHRONIC KIDNEY DISEASE, STAGE 3A (MULTI): Primary | ICD-10-CM

## 2024-03-11 ENCOUNTER — ANTICOAGULATION - WARFARIN VISIT (OUTPATIENT)
Dept: CARDIOLOGY | Facility: CLINIC | Age: 74
End: 2024-03-11
Payer: MEDICARE

## 2024-03-11 DIAGNOSIS — I48.91 ATRIAL FIBRILLATION, UNSPECIFIED TYPE (MULTI): Primary | ICD-10-CM

## 2024-03-11 DIAGNOSIS — Z79.01 LONG TERM (CURRENT) USE OF ANTICOAGULANTS: ICD-10-CM

## 2024-03-11 DIAGNOSIS — I48.92 ATRIAL FLUTTER, UNSPECIFIED TYPE (MULTI): ICD-10-CM

## 2024-03-11 LAB
POC INR: 4.4 (ref 2–3)
POC PROTHROMBIN TIME: ABNORMAL

## 2024-03-11 PROCEDURE — 99211 OFF/OP EST MAY X REQ PHY/QHP: CPT

## 2024-03-11 PROCEDURE — 85610 PROTHROMBIN TIME: CPT | Mod: QW

## 2024-03-18 ENCOUNTER — ANTICOAGULATION - WARFARIN VISIT (OUTPATIENT)
Dept: CARDIOLOGY | Facility: CLINIC | Age: 74
End: 2024-03-18
Payer: MEDICARE

## 2024-03-18 DIAGNOSIS — I48.92 ATRIAL FLUTTER, UNSPECIFIED TYPE (MULTI): ICD-10-CM

## 2024-03-18 DIAGNOSIS — Z79.01 LONG TERM (CURRENT) USE OF ANTICOAGULANTS: ICD-10-CM

## 2024-03-18 DIAGNOSIS — I48.91 ATRIAL FIBRILLATION, UNSPECIFIED TYPE (MULTI): Primary | ICD-10-CM

## 2024-03-18 LAB
POC INR: 2.9
POC PROTHROMBIN TIME: NORMAL

## 2024-03-18 PROCEDURE — 85610 PROTHROMBIN TIME: CPT | Mod: QW

## 2024-03-18 PROCEDURE — 99211 OFF/OP EST MAY X REQ PHY/QHP: CPT

## 2024-03-18 NOTE — PROGRESS NOTES
Patient identification verified with 2 identifiers.    Location: [facility] - [address] [address] [place], [place] [address] [phone]     Referring Physician: DR. ESCALANTE  Enrollment/ Re-enrollment date: Enrollment/Re-enrollment date: 2024  INR Goal: 2.0-3.0  INR monitoring is per AMS protocol.  Anticoagulation Medication: warfarin 10 MG  Indication: atrial fibrillation  Subjective   Bleeding signs/symptoms: No    Bruising: No   Major bleeding event: No  Thrombosis signs/symptoms: No  Thromboembolic event: No  Missed doses: No  Extra doses: No  Medication changes: No  Dietary changes: No  Change in health: No  Change in activity: No  Alcohol: No  Other concerns: No    Upcoming Procedures:  Does the Patient Have any upcoming procedures that require interruption in anticoagulation therapy? no  Does the patient require bridging? no      Anticoagulation Summary  As of 3/18/2024      INR goal:  2.0-3.0   TTR:  53.8 % (5.7 mo)   INR used for dosin.90 (3/18/2024)   Weekly warfarin total:  80 mg               Assessment/Plan   Therapeutic     1. New dose: no change    2. Next INR: 1 week      Education provided to patient during the visit:  Patient instructed to call in interim with questions, concerns and changes.   Patient educated on interactions between medications and warfarin.   Patient educated on compliance with dosing, follow up appointments, and prescribed plan of care.

## 2024-03-22 DIAGNOSIS — E11.9 TYPE 2 DIABETES MELLITUS WITHOUT COMPLICATIONS (MULTI): ICD-10-CM

## 2024-03-22 RX ORDER — DAPAGLIFLOZIN 10 MG/1
10 TABLET, FILM COATED ORAL DAILY
Qty: 30 TABLET | Refills: 5 | Status: SHIPPED | OUTPATIENT
Start: 2024-03-22 | End: 2024-03-26 | Stop reason: SDUPTHER

## 2024-03-25 ENCOUNTER — ANTICOAGULATION - WARFARIN VISIT (OUTPATIENT)
Dept: CARDIOLOGY | Facility: CLINIC | Age: 74
End: 2024-03-25
Payer: MEDICARE

## 2024-03-25 DIAGNOSIS — I48.91 ATRIAL FIBRILLATION, UNSPECIFIED TYPE (MULTI): Primary | ICD-10-CM

## 2024-03-25 DIAGNOSIS — Z79.01 LONG TERM (CURRENT) USE OF ANTICOAGULANTS: ICD-10-CM

## 2024-03-25 DIAGNOSIS — I48.92 ATRIAL FLUTTER, UNSPECIFIED TYPE (MULTI): ICD-10-CM

## 2024-03-25 LAB
POC INR: 4.2
POC PROTHROMBIN TIME: NORMAL

## 2024-03-25 PROCEDURE — 85610 PROTHROMBIN TIME: CPT | Mod: QW

## 2024-03-25 PROCEDURE — 99211 OFF/OP EST MAY X REQ PHY/QHP: CPT

## 2024-03-25 NOTE — PROGRESS NOTES
Patient identification verified with 2 identifiers.    Location: [facility] - [address] [address] [facility]. [place], [place] [address] [phone]     Referring Physician: DR. ESCALANTE  Enrollment/ Re-enrollment date: Enrollment/Re-enrollment date: 2024 SENT TO MD ESCALANTE ON 3/25/24  INR Goal: 2.0-3.0  INR monitoring is per AMS protocol.  Anticoagulation Medication: warfarin 10 MG  Indication: atrial fibrillation    Subjective   Bleeding signs/symptoms: No    Bruising: No   Major bleeding event: No  Thrombosis signs/symptoms: No  Thromboembolic event: No  Missed doses: No  Extra doses: No  Medication changes: No  Dietary changes: Yes  PT HAS BEEN EDUCATED ON EATING ORANGES AND TANGERINES, HE SOMETIMES EATS SEVERAL IN A DAY  Change in health: No  Change in activity: No  Alcohol: No  Other concerns: No    Upcoming Procedures:  Does the Patient Have any upcoming procedures that require interruption in anticoagulation therapy? no  Does the patient require bridging? no      Anticoagulation Summary  As of 3/25/2024      INR goal:  2.0-3.0   TTR:  52.0 % (6 mo)   INR used for dosin.20 (3/25/2024)   Weekly warfarin total:  70 mg               Assessment/Plan   Supratherapeutic     1. New dose: HOLD 2 DOSES AND WILL DECREASE PER PROTOCOL  2. Next INR: 1 week      Education provided to patient during the visit:  Patient instructed to call in interim with questions, concerns and changes.   Patient educated on interactions between medications and warfarin.   Patient educated on dietary consistency in vitamin k consumption.   Patient educated on signs of bleeding/clotting.   Patient educated on compliance with dosing, follow up appointments, and prescribed plan of care.

## 2024-03-26 DIAGNOSIS — E11.9 TYPE 2 DIABETES MELLITUS WITHOUT COMPLICATIONS (MULTI): ICD-10-CM

## 2024-03-26 RX ORDER — DAPAGLIFLOZIN 10 MG/1
10 TABLET, FILM COATED ORAL DAILY
Qty: 30 TABLET | Refills: 5 | Status: SHIPPED | OUTPATIENT
Start: 2024-03-26 | End: 2024-04-16 | Stop reason: SDUPTHER

## 2024-04-01 ENCOUNTER — ANTICOAGULATION - WARFARIN VISIT (OUTPATIENT)
Dept: CARDIOLOGY | Facility: CLINIC | Age: 74
End: 2024-04-01
Payer: MEDICARE

## 2024-04-01 DIAGNOSIS — Z79.01 LONG TERM (CURRENT) USE OF ANTICOAGULANTS: ICD-10-CM

## 2024-04-01 DIAGNOSIS — I48.92 ATRIAL FLUTTER, UNSPECIFIED TYPE (MULTI): ICD-10-CM

## 2024-04-01 DIAGNOSIS — I48.91 ATRIAL FIBRILLATION, UNSPECIFIED TYPE (MULTI): Primary | ICD-10-CM

## 2024-04-01 LAB
POC INR: 2.2
POC PROTHROMBIN TIME: NORMAL

## 2024-04-01 PROCEDURE — 85610 PROTHROMBIN TIME: CPT | Mod: QW

## 2024-04-01 PROCEDURE — 99211 OFF/OP EST MAY X REQ PHY/QHP: CPT

## 2024-04-01 NOTE — PROGRESS NOTES
Patient identification verified with 2 identifiers.    Location: [facility] - [address] [address] [facility]. [place], [place] [address] [phone]     Referring Physician: DR. STEPHEN ESCALANTE  Enrollment/ Re-enrollment date: 3/25/25  INR Goal: 2.0-3.0  INR monitoring is per [facility] protocol.  Anticoagulation Medication: warfarin 10 MG TABLETS  Indication: atrial fibrillation    Subjective   Bleeding signs/symptoms: No  Bruising: No   Major bleeding event: No  Thrombosis signs/symptoms: No  Thromboembolic event: No  Missed doses: No  Extra doses: No  Medication changes: No  Dietary changes: No  Change in health: No  Change in activity: No  Alcohol: No  Other concerns: No    Upcoming Procedures:  Does the Patient Have any upcoming procedures that require interruption in anticoagulation therapy? no  Does the patient require bridging? no      Anticoagulation Summary  As of 2024      INR goal:  2.0-3.0   TTR:  51.6 % (6.2 mo)   INR used for dosin.20 (2024)   Weekly warfarin total:  70 mg               Assessment/Plan   Therapeutic     1. New dose: no change    2. Next INR: 1 week      Education provided to patient during the visit:  Patient instructed to call in interim with questions, concerns and changes.   Patient educated on interactions between medications and warfarin.   Patient educated on dietary consistency in vitamin k consumption.   Patient educated on affects of alcohol consumption while taking warfarin.   Patient educated on signs of bleeding/clotting.   Patient educated on compliance with dosing, follow up appointments, and prescribed plan of care.

## 2024-04-08 ENCOUNTER — ANTICOAGULATION - WARFARIN VISIT (OUTPATIENT)
Dept: CARDIOLOGY | Facility: CLINIC | Age: 74
End: 2024-04-08
Payer: MEDICARE

## 2024-04-08 DIAGNOSIS — I48.91 ATRIAL FIBRILLATION, UNSPECIFIED TYPE (MULTI): Primary | ICD-10-CM

## 2024-04-08 DIAGNOSIS — I48.92 ATRIAL FLUTTER, UNSPECIFIED TYPE (MULTI): ICD-10-CM

## 2024-04-08 DIAGNOSIS — Z79.01 LONG TERM (CURRENT) USE OF ANTICOAGULANTS: ICD-10-CM

## 2024-04-08 LAB
POC INR: 3.2
POC PROTHROMBIN TIME: NORMAL

## 2024-04-08 PROCEDURE — 99211 OFF/OP EST MAY X REQ PHY/QHP: CPT

## 2024-04-08 PROCEDURE — 85610 PROTHROMBIN TIME: CPT | Mod: QW

## 2024-04-08 NOTE — PROGRESS NOTES
Patient identification verified with 2 identifiers.    Location: [facility] - [address] [address] [facility]. [place], [place] [address] [phone]     Referring Physician: Dr. Tang Pino  Enrollment/ Re-enrollment date: 3/25/2025   INR Goal: 2.0-3.0  INR monitoring is per [facility] protocol.  Anticoagulation Medication: warfarin  Indication: Atrial Fibrillation/Atrial Flutter    Subjective   Bleeding signs/symptoms: No    Bruising: No   Major bleeding event: No  Thrombosis signs/symptoms: No  Thromboembolic event: No  Missed doses: No  Extra doses: No  Medication changes: No  Dietary changes: No  Change in health: No  Change in activity: No  Alcohol: No  Other concerns: No    Upcoming Procedures:  Does the Patient Have any upcoming procedures that require interruption in anticoagulation therapy? no  Does the patient require bridging? no      Anticoagulation Summary  As of 4/8/2024      INR goal:  2.0-3.0   TTR:  52.7 % (6.4 mo)   INR used for dosing:  3.20 (4/8/2024)   Weekly warfarin total:  65 mg               Assessment/Plan   Supratherapeutic     1. New dose:  Reduce TWD 5%.      2. Next INR: 1 week      Education provided to patient during the visit:  Patient instructed to call in interim with questions, concerns and changes.   Patient educated on interactions between medications and warfarin.   Patient educated on dietary consistency in vitamin k consumption.   Patient educated on affects of alcohol consumption while taking warfarin.   Patient educated on signs of bleeding/clotting.   Patient educated on compliance with dosing, follow up appointments, and prescribed plan of care.

## 2024-04-09 ENCOUNTER — OFFICE VISIT (OUTPATIENT)
Dept: NEUROLOGY | Facility: HOSPITAL | Age: 74
End: 2024-04-09
Payer: MEDICARE

## 2024-04-09 VITALS
DIASTOLIC BLOOD PRESSURE: 67 MMHG | SYSTOLIC BLOOD PRESSURE: 105 MMHG | HEIGHT: 68 IN | BODY MASS INDEX: 36.25 KG/M2 | TEMPERATURE: 96.9 F | HEART RATE: 77 BPM

## 2024-04-09 DIAGNOSIS — R41.3 MEMORY LOSS: Primary | ICD-10-CM

## 2024-04-09 DIAGNOSIS — F03.A0 MILD DEMENTIA, UNSPECIFIED DEMENTIA TYPE, UNSPECIFIED WHETHER BEHAVIORAL, PSYCHOTIC, OR MOOD DISTURBANCE OR ANXIETY (MULTI): ICD-10-CM

## 2024-04-09 PROCEDURE — 3044F HG A1C LEVEL LT 7.0%: CPT | Performed by: PSYCHIATRY & NEUROLOGY

## 2024-04-09 PROCEDURE — 1036F TOBACCO NON-USER: CPT | Performed by: PSYCHIATRY & NEUROLOGY

## 2024-04-09 PROCEDURE — 1160F RVW MEDS BY RX/DR IN RCRD: CPT | Performed by: PSYCHIATRY & NEUROLOGY

## 2024-04-09 PROCEDURE — 1159F MED LIST DOCD IN RCRD: CPT | Performed by: PSYCHIATRY & NEUROLOGY

## 2024-04-09 PROCEDURE — 4010F ACE/ARB THERAPY RXD/TAKEN: CPT | Performed by: PSYCHIATRY & NEUROLOGY

## 2024-04-09 PROCEDURE — 99205 OFFICE O/P NEW HI 60 MIN: CPT | Performed by: PSYCHIATRY & NEUROLOGY

## 2024-04-09 PROCEDURE — 3008F BODY MASS INDEX DOCD: CPT | Performed by: PSYCHIATRY & NEUROLOGY

## 2024-04-09 PROCEDURE — 3078F DIAST BP <80 MM HG: CPT | Performed by: PSYCHIATRY & NEUROLOGY

## 2024-04-09 PROCEDURE — G2211 COMPLEX E/M VISIT ADD ON: HCPCS | Performed by: PSYCHIATRY & NEUROLOGY

## 2024-04-09 PROCEDURE — 99417 PROLNG OP E/M EACH 15 MIN: CPT | Performed by: PSYCHIATRY & NEUROLOGY

## 2024-04-09 PROCEDURE — 3074F SYST BP LT 130 MM HG: CPT | Performed by: PSYCHIATRY & NEUROLOGY

## 2024-04-09 PROCEDURE — 99215 OFFICE O/P EST HI 40 MIN: CPT | Performed by: PSYCHIATRY & NEUROLOGY

## 2024-04-09 RX ORDER — DONEPEZIL HYDROCHLORIDE 5 MG/1
5 TABLET, FILM COATED ORAL NIGHTLY
Qty: 30 TABLET | Refills: 5 | Status: SHIPPED | OUTPATIENT
Start: 2024-04-09 | End: 2025-04-09

## 2024-04-09 NOTE — PROGRESS NOTES
Provider impressions:  Mr. Mc  is a 73 -year-old  man with 12  years of formal education and PMHx of stroke in 2006, Afib on Warfarin, cardiomyopathy HTN, HLD, CKD, T2DM and prostate cancer who is presenting today with chief complaint of cognitive changes. He is referred by PCP, Dr. Francis  for cognitive evaluation. He is accompanied by his daughter, Justin. He has been having STM issues for the last year, he lives with his son who helps him with iADLs, neurological examination is reassuring, he scored 12/30 on MoCA today.  MRI brain wo contrast on 2/13/2024 showed mild brain parenchymal volume loss, small focus of encephalomalacia in medial left occipital lobe, chronic right thalamic infarct. There are scattered nonspecific white matter changes within the cerebral hemispheres bilaterally which while nonspecific, given the patient's age, likely represent sequelae of more remote small-vessel, no acute intracranial abnormalities. TSH and vitamin B12 recently checked and came back in normal range.  Given the history and today's evaluation, I suspect mild dementia with behavioral disturbance  Potential etiologies include Alzheimer's disease, vascular dementia, a multifactorial etiology can't be entirely ruled out.  We discussed starting donepezil, 5 mg daily  Potential side effects include nausea, low heart rate and low blood pressure, vivid dreams if taken late at night.     PLAN:  I ordered EKG to rule out Qtc prolongation  Start donepezil 5 mg daily after getting an EKG   I recommended adequate control of vascular risk factors.  We discussed strategies to remain physically and mentally active.  Follow up in 3 months or earlier if needed.    Please call [phone] if you have any questions.     History of present illness:  Mr. Mc  is a 73 -year-old  man with 12  years of formal education and PMHx of stroke in 2006, Afib on Warfarin, cardiomyopathy HTN, HLD, CKD, T2DM and prostate cancer who is  "presenting today with chief complaint of cognitive changes. He is referred by PCP, Dr. Francis  for cognitive evaluation. He is accompanied by his daughter, Justin.  Brunilda has been noticing cognitive changes for less than a year. Onset was gradual, forgets scheduled events but uses a calendar, misplaces objects. Forgets conversations. Denies repeating self.   Denies any functional impairment. He lives with his son, Javier who manages finances and helps him with iADLs.    Mood is \"good.\". has not noticed any significant depressive symptoms. Gets frustrated with memory problems.   No excessive worry or anxiety.     Has good appetite - eats healthy.     No change in personality, social disinhibition, change in dietary preferences, loss of empathy, stereotyped or repetitive behaviors.     He reports visual hallucinations, he sees people in the room, twice a week.   Possible fluctuating cognition,   No tremors, REM sleep behavior disorder,  falls.     Functional changes:   Born and raised in [place], [place]   Sleep: goes to bed at 8 pm wakes up at 6 am, takes a nap during the day  Current living situation - lives with his son, Javier in 2-story house .   Driving -he was getting lost, stopped driving in December 2023.   Finances - His son helps managing finances  Cooking -he occasionally cooks, left the stove on before   ADLS - independent in most of ADLs  Medications - Takes own medication; uses pillbox.   Weapons at home: no  Knows 911? yes    Neuropsychiatric symptoms:   Depression - denies depression. Appetite ok. Sleeping fine. No worthlessness/helplessness. No suicidal thoughts, intent or plans.   Anxiety - Denies.   Psychosis - Denies.   Ghada - Denies.   Suicidality - Denies.     Prior Work-up:   Normal TSH, Vit B12.   Neuropsychological testing: not completed    Past Neuropsychiatric History:  Handedness: right.   History of traumatic brain injury: None.   History of seizures: None  History of stroke: yes, in " "2006.   Past psychiatric history: None    PMH/PSH:  As above, in addition    Meds: reviewed as listed    Allergies: reviewed as listed    Family history:   Psychiatric: None.   Dementia: None   Parkinsons disease: None  Huntingtons disease: None  ALS: None    Social History:   No Tobacco use, stopped 15 years ago, occasional alcohol use, no recreational drugs  . Has 2 children   Worked in a factory, retired in 2021    Mental Status Examination:  General appearance: Well-groomed, good eye contact, cooperative  Orientation: Alert and oriented to person, place, and time  Motor: no psychomotor agitation or retardation, stable gait  Speech: regular rate, rhythm, tone, and volume  Mood: \"good\"  Affect: stable, full range, with brightening, and mood congruent  Passive death wish: denies  Suicidal ideation: denies  Thought process: logical, linear, and goal-directed without loosening of associations  Thought content: no hallucinations, delusions, and not responding to internal stimuli  Insight/Judgment: good/good  Praxis: Transitive/Intransitive/Orofacial  Fund of knowledge: good  Recent and remote memory: good  Attention span and concentration: good  Language: normal receptive and expressive language without paraphrasic errors    MoCA- Sam Cognitive Assessment ( 04/09/2024  ) : 12/30   Visuospatial / Executive: 1/5  Naming: 3/3  Read List of Digits: 1/2  Read List of Letters: 0/1  Serial Sevens: 2/3   Language Repeat: 1/2   Language Fluency: 1/1   Abstraction: 0/2   Delayed Recall: 0/5   Orientation: 2/6  Education:  12 years    \"f\"= [13], \"animals\"= [4]  Memory Index Score (MIS): 0/15  No agraphia or alexia  Completed 3-step Luria task  Negative applause sign  Head turning sign: yes     NEUROLOGICAL EXAMINATION    Opthalmoscopic:   Normal.  Fundi were well visualized with normal disc margins, clear vessels, and vascular pulsations, No disc edema.  The cup/disk ratio was not enlarged.  No hemorrhages or exudates " were present in the posterior segments that were visualized.    Cranial nerves:  CN II: visual fields full to confrontation  CN III, IV, VI: Pupils round, reactive to light and accommodation.  Lids symmetric.  No ptosis.  Extra-occular muscles are intact with normal alignment.  No nystagmus  CN V: Facial sensation intact bilaterally.    CN VII: Normal and symmetric facial strength.  Nasolabial folds symmetric  CN VIII: Hearing intact to finger rub  CN IX: Palate elevates symmetrically.  CN XI: Normal shoulder shrug and neck turning  CN XII: Tongue midline, with normal bulk and strength, no fasciculations      Motor:  Muscle bulk was normal in all extremities.  5/5 strength in all extremities  Mild left head turn and right head tilt  Normal finger taps and hand opening  Paratonia in BLE  No abnormal movements    Reflexes:   Right UE     LEFT UE  BR: 2          BR: 2  Biceps: 2    Biceps: 2  Triceps: 2   Triceps: 2    RIGHT LE     LEFT LE  Knee: 2        Knee: 2  Ankle: 2       Ankle: 2    Negative Elia's reflex  No frontal release signs    Coordination:  Normal finger to nose testing and rapid alternating movements    Gait:  Able to stand from seated position with arms across chest  Stable gait    Sensory:  Negative Romberg's sign     ROS: All systems reviewed, pertinent positives noted in HPI

## 2024-04-09 NOTE — PATIENT INSTRUCTIONS
You were seen today by Dr. Perkins.  It is a pleasure seeing you.    Given the history and today's evaluation, I suspect mild dementia with behavioral disturbance  Potential etiologies include Alzheimer's disease, vascular dementia, a multifactorial etiology can't be entirely ruled out.  We discussed starting donepezil, 5 mg daily  Potential side effects include nausea, low heart rate and low blood pressure, vivid dreams if taken late at night.     PLAN:  I ordered EKG to rule out Qtc prolongation  Start donepezil 5 mg daily after getting an EKG   I recommended adequate control of vascular risk factors.  We discussed strategies to remain physically and mentally active.  Follow up in 3 months or earlier if needed.    Please call [phone] if you have any questions.     General brain health guidelines:  - make sure your other medical conditions are well controlled (e.g., high blood pressure, high cholesterol, diabetes, sleep apnea etc)  - do not smoke or chew tobacco  - address any sensory deficits (e.g., proper glasses for poor eyesight, hearing aids for hearing loss)  - use a weekly pill box  - eat a heart healthy diet (e.g., lots of fruits and vegetables, low fat and cholesterol)  - exercise at least four days per week, 30 minutes at a time at an intensity that would make it difficult to converse with someone   - make sure you are getting at least 7 hours of quality sleep per night  - keep yourself mentally active daily by reading, playing cards, doing word searches, puzzles, etc.  - stay socially active by being part of a group or organization     Here are more resources available for you :    a. Consultation with a  who specializes in cognitive decline in older adulthood. They can assist with counseling, educational resources, patient support groups, caregiver support groups, and preparation for future changes. Beth Wachter at [facility] ([address] in  [place]; [phone]) could help with a referral, or she can contact the Alzheimer's Association 24-hour Helpline (1-669.742.6776).    b. Denise Gr at [facility] runs a caregiver training program that uses empirically based techniques to prepare and equip caregivers for the demands of that role when caring for someone with dementia.    c. Helpful resources for addressing the day-to-day challenges of cognitive dysfunction are offered at the Alzheimer's Association (now the Alzheimer's Disease and Related Disorders Association) website (www.alz.org) or by calling their 24-hour Helpline (1-399.881.1661).    d. The [employer] website also has helpful information: http://www.caregiver.org/caregiver/jsp/home.jsp

## 2024-04-13 DIAGNOSIS — K27.4 GASTROINTESTINAL HEMORRHAGE ASSOCIATED WITH PEPTIC ULCER: ICD-10-CM

## 2024-04-15 ENCOUNTER — ANTICOAGULATION - WARFARIN VISIT (OUTPATIENT)
Dept: CARDIOLOGY | Facility: CLINIC | Age: 74
End: 2024-04-15
Payer: MEDICARE

## 2024-04-15 ENCOUNTER — HOSPITAL ENCOUNTER (OUTPATIENT)
Dept: CARDIOLOGY | Facility: CLINIC | Age: 74
Discharge: HOME | End: 2024-04-15
Payer: MEDICARE

## 2024-04-15 DIAGNOSIS — I48.91 ATRIAL FIBRILLATION, UNSPECIFIED TYPE (MULTI): Primary | ICD-10-CM

## 2024-04-15 DIAGNOSIS — R41.3 MEMORY LOSS: ICD-10-CM

## 2024-04-15 DIAGNOSIS — Z79.01 LONG TERM (CURRENT) USE OF ANTICOAGULANTS: ICD-10-CM

## 2024-04-15 DIAGNOSIS — I48.92 ATRIAL FLUTTER, UNSPECIFIED TYPE (MULTI): ICD-10-CM

## 2024-04-15 LAB
POC INR: 3.4
POC PROTHROMBIN TIME: NORMAL

## 2024-04-15 PROCEDURE — 99211 OFF/OP EST MAY X REQ PHY/QHP: CPT | Mod: 25

## 2024-04-15 PROCEDURE — 93005 ELECTROCARDIOGRAM TRACING: CPT

## 2024-04-15 PROCEDURE — 85610 PROTHROMBIN TIME: CPT | Mod: QW

## 2024-04-15 RX ORDER — PANTOPRAZOLE SODIUM 40 MG/1
40 TABLET, DELAYED RELEASE ORAL DAILY
Qty: 100 TABLET | Refills: 2 | Status: SHIPPED | OUTPATIENT
Start: 2024-04-15

## 2024-04-15 NOTE — PROGRESS NOTES
Patient identification verified with 2 identifiers.    Location: [facility] - [address] [address] [address]. [place], [place] [address] [phone]     Referring Physician: Dr. Tang Pino  Enrollment/ Re-enrollment date: 3/25/2025   INR Goal: 2.0-3.0  INR monitoring is per [facility] protocol.  Anticoagulation Medication: warfarin  Indication: Atrial Fibrillation/Atrial Flutter  Subjective   Bleeding signs/symptoms: No    Bruising: No   Major bleeding event: No  Thrombosis signs/symptoms: No  Thromboembolic event: No  Missed doses: No  Extra doses: No  Medication changes: Yes  WILL START DONEZEPRIL AFTER HE HAS EKG AND IS ORDERED BY NEUROLOGY.  Dietary changes: No  Change in health: No  Change in activity: No  Alcohol: No  Other concerns: No    Upcoming Procedures:  Does the Patient Have any upcoming procedures that require interruption in anticoagulation therapy? no  Does the patient require bridging? no      Anticoagulation Summary  As of 4/15/2024      INR goal:  2.0-3.0   TTR:  50.8% (6.7 mo)   INR used for dosing:  3.40 (4/15/2024)   Weekly warfarin total:  60 mg               Assessment/Plan   Supratherapeutic     1. New dose: WILL DECREASE TWD BY 5%  2. Next INR: 1 week      Education provided to patient during the visit:  Patient instructed to call in interim with questions, concerns and changes.   Patient educated on interactions between medications and warfarin.

## 2024-04-16 DIAGNOSIS — E11.9 TYPE 2 DIABETES MELLITUS WITHOUT COMPLICATIONS (MULTI): ICD-10-CM

## 2024-04-16 RX ORDER — DAPAGLIFLOZIN 10 MG/1
10 TABLET, FILM COATED ORAL DAILY
Qty: 90 TABLET | Refills: 3 | Status: SHIPPED | OUTPATIENT
Start: 2024-04-16

## 2024-04-22 ENCOUNTER — HOSPITAL ENCOUNTER (OUTPATIENT)
Dept: CARDIOLOGY | Facility: CLINIC | Age: 74
Discharge: HOME | End: 2024-04-22
Payer: MEDICARE

## 2024-04-22 ENCOUNTER — ANTICOAGULATION - WARFARIN VISIT (OUTPATIENT)
Dept: CARDIOLOGY | Facility: CLINIC | Age: 74
End: 2024-04-22
Payer: MEDICARE

## 2024-04-22 DIAGNOSIS — I48.91 ATRIAL FIBRILLATION, UNSPECIFIED TYPE (MULTI): Primary | ICD-10-CM

## 2024-04-22 DIAGNOSIS — Z79.01 LONG TERM (CURRENT) USE OF ANTICOAGULANTS: ICD-10-CM

## 2024-04-22 DIAGNOSIS — I48.92 ATRIAL FLUTTER, UNSPECIFIED TYPE (MULTI): ICD-10-CM

## 2024-04-22 LAB
ATRIAL RATE: 63 BPM
P AXIS: 37 DEGREES
P OFFSET: 167 MS
P ONSET: 103 MS
POC INR: 2.8
POC PROTHROMBIN TIME: NORMAL
PR INTERVAL: 212 MS
Q ONSET: 209 MS
QRS COUNT: 10 BEATS
QRS DURATION: 110 MS
QT INTERVAL: 414 MS
QTC CALCULATION(BAZETT): 423 MS
QTC FREDERICIA: 420 MS
R AXIS: 54 DEGREES
T AXIS: 38 DEGREES
T OFFSET: 416 MS
VENTRICULAR RATE: 63 BPM

## 2024-04-22 PROCEDURE — 99211 OFF/OP EST MAY X REQ PHY/QHP: CPT

## 2024-04-22 PROCEDURE — 93005 ELECTROCARDIOGRAM TRACING: CPT

## 2024-04-22 PROCEDURE — 85610 PROTHROMBIN TIME: CPT

## 2024-04-22 PROCEDURE — 93010 ELECTROCARDIOGRAM REPORT: CPT | Performed by: INTERNAL MEDICINE

## 2024-04-22 NOTE — PROGRESS NOTES
Patient identification verified with 2 identifiers.    Location: [facility] - [address] [address] [place], [place] [address] [phone]     Referring Physician: DR. STEPHEN ESCALANTE  Enrollment/ Re-enrollment date: 3/25/2025   INR Goal: 2.0-3.0  INR monitoring is per [facility] protocol.  Anticoagulation Medication: warfarin  Indication: Atrial Fibrillation/Atrial Flutter    Subjective   Bleeding signs/symptoms: No    Bruising: No   Major bleeding event: No  Thrombosis signs/symptoms: No  Thromboembolic event: No  Missed doses: No  Extra doses: No  Medication changes: No  Dietary changes: No  Change in health: No  Change in activity: No  Alcohol: No  Other concerns: No    Upcoming Procedures:  Does the Patient Have any upcoming procedures that require interruption in anticoagulation therapy? no  Does the patient require bridging? no      Anticoagulation Summary  As of 4/22/2024      INR goal:  2.0-3.0   TTR:  50.8% (6.7 mo)   INR used for dosing:                 Assessment/Plan   Therapeutic     1. New dose: no change    2. Next INR: 1 week      Education provided to patient during the visit:  Patient instructed to call in interim with questions, concerns and changes.   Patient educated on compliance with dosing, follow up appointments, and prescribed plan of care.

## 2024-04-29 ENCOUNTER — ANTICOAGULATION - WARFARIN VISIT (OUTPATIENT)
Dept: CARDIOLOGY | Facility: CLINIC | Age: 74
End: 2024-04-29
Payer: MEDICARE

## 2024-04-29 DIAGNOSIS — Z79.01 LONG TERM (CURRENT) USE OF ANTICOAGULANTS: ICD-10-CM

## 2024-04-29 DIAGNOSIS — I48.91 ATRIAL FIBRILLATION, UNSPECIFIED TYPE (MULTI): Primary | ICD-10-CM

## 2024-04-29 DIAGNOSIS — I48.92 ATRIAL FLUTTER, UNSPECIFIED TYPE (MULTI): ICD-10-CM

## 2024-04-29 LAB
POC INR: 3.7
POC PROTHROMBIN TIME: NORMAL

## 2024-04-29 PROCEDURE — 85610 PROTHROMBIN TIME: CPT | Mod: QW

## 2024-04-29 PROCEDURE — 99211 OFF/OP EST MAY X REQ PHY/QHP: CPT

## 2024-04-29 NOTE — PROGRESS NOTES
Patient identification verified with 2 identifiers.    Location: [facility] - [address] [address] [address]. [place], [place] [address] [phone]     Referring Physician: DR. STEPHEN ESCALANTE  Enrollment/ Re-enrollment date: 3/25/2025   INR Goal: 2.0-3.0  INR monitoring is per [facility] protocol.  Anticoagulation Medication: warfarin  Indication: Atrial Fibrillation/Atrial Flutter  Subjective   Bleeding signs/symptoms: No    Bruising: No   Major bleeding event: No  Thrombosis signs/symptoms: No  Thromboembolic event: No  Missed doses: No  Extra doses: No  Medication changes: No  Dietary changes: Yes  PT ATE DIFFERENTLY THIS WEEKEND, DUE TO A .  Change in health: No  Change in activity: No  Alcohol: No  Other concerns: No    Upcoming Procedures:  Does the Patient Have any upcoming procedures that require interruption in anticoagulation therapy? no  Does the patient require bridging? no      Anticoagulation Summary  As of 2024      INR goal:  2.0-3.0   TTR:  49.3% (7.1 mo)   INR used for dosing:  3.70 (2024)   Weekly warfarin total:  60 mg               Assessment/Plan   Supratherapeutic     1. New dose: HOLD DOSE X 1 AND WILL MAINTAIN DOSE DUE TO DIET INCONSISTENCY  2. Next INR: 1 week      Education provided to patient during the visit:  Patient instructed to call in interim with questions, concerns and changes.   Patient educated on compliance with dosing, follow up appointments, and prescribed plan of care.

## 2024-04-30 ENCOUNTER — LAB (OUTPATIENT)
Dept: LAB | Facility: LAB | Age: 74
End: 2024-04-30
Payer: MEDICARE

## 2024-04-30 ENCOUNTER — OFFICE VISIT (OUTPATIENT)
Dept: PRIMARY CARE | Facility: CLINIC | Age: 74
End: 2024-04-30
Payer: MEDICARE

## 2024-04-30 VITALS — BODY MASS INDEX: 35.5 KG/M2 | WEIGHT: 233.5 LBS

## 2024-04-30 DIAGNOSIS — E78.2 MIXED HYPERLIPIDEMIA: ICD-10-CM

## 2024-04-30 DIAGNOSIS — R53.81 MALAISE: ICD-10-CM

## 2024-04-30 DIAGNOSIS — N18.32 CHRONIC RENAL IMPAIRMENT, STAGE 3B (MULTI): Primary | ICD-10-CM

## 2024-04-30 DIAGNOSIS — H91.90 HEARING LOSS, UNSPECIFIED HEARING LOSS TYPE, UNSPECIFIED LATERALITY: Primary | ICD-10-CM

## 2024-04-30 DIAGNOSIS — E11.69 TYPE 2 DIABETES MELLITUS WITH OTHER SPECIFIED COMPLICATION, WITHOUT LONG-TERM CURRENT USE OF INSULIN (MULTI): ICD-10-CM

## 2024-04-30 DIAGNOSIS — I63.9 CEREBROVASCULAR ACCIDENT (CVA), UNSPECIFIED MECHANISM (MULTI): ICD-10-CM

## 2024-04-30 DIAGNOSIS — I10 BENIGN ESSENTIAL HYPERTENSION: ICD-10-CM

## 2024-04-30 DIAGNOSIS — I42.9 CARDIOMYOPATHY, UNSPECIFIED TYPE (MULTI): ICD-10-CM

## 2024-04-30 DIAGNOSIS — R79.89 LOW TSH LEVEL: ICD-10-CM

## 2024-04-30 DIAGNOSIS — C61 PROSTATE CANCER (MULTI): ICD-10-CM

## 2024-04-30 DIAGNOSIS — D64.9 ANEMIA, UNSPECIFIED TYPE: ICD-10-CM

## 2024-04-30 DIAGNOSIS — I48.91 ATRIAL FIBRILLATION, UNSPECIFIED TYPE (MULTI): ICD-10-CM

## 2024-04-30 DIAGNOSIS — E66.01 SEVERE OBESITY (BMI 35.0-35.9 WITH COMORBIDITY) (MULTI): ICD-10-CM

## 2024-04-30 DIAGNOSIS — M19.049 LOCALIZED, PRIMARY OSTEOARTHRITIS OF HAND, UNSPECIFIED LATERALITY: ICD-10-CM

## 2024-04-30 LAB
ANION GAP SERPL CALC-SCNC: 15 MMOL/L (ref 10–20)
BASOPHILS # BLD AUTO: 0.02 X10*3/UL (ref 0–0.1)
BASOPHILS NFR BLD AUTO: 0.4 %
BUN SERPL-MCNC: 47 MG/DL (ref 6–23)
CALCIUM SERPL-MCNC: 9.4 MG/DL (ref 8.6–10.6)
CHLORIDE SERPL-SCNC: 104 MMOL/L (ref 98–107)
CHOLEST SERPL-MCNC: 143 MG/DL (ref 0–199)
CHOLESTEROL/HDL RATIO: 3.8
CO2 SERPL-SCNC: 22 MMOL/L (ref 21–32)
CREAT SERPL-MCNC: 2.19 MG/DL (ref 0.5–1.3)
EGFRCR SERPLBLD CKD-EPI 2021: 31 ML/MIN/1.73M*2
EOSINOPHIL # BLD AUTO: 0.06 X10*3/UL (ref 0–0.4)
EOSINOPHIL NFR BLD AUTO: 1.2 %
ERYTHROCYTE [DISTWIDTH] IN BLOOD BY AUTOMATED COUNT: 14.6 % (ref 11.5–14.5)
GLUCOSE SERPL-MCNC: 158 MG/DL (ref 74–99)
HCT VFR BLD AUTO: 41.8 % (ref 41–52)
HDLC SERPL-MCNC: 38.1 MG/DL
HGB BLD-MCNC: 14.3 G/DL (ref 13.5–17.5)
IMM GRANULOCYTES # BLD AUTO: 0.04 X10*3/UL (ref 0–0.5)
IMM GRANULOCYTES NFR BLD AUTO: 0.8 % (ref 0–0.9)
LDLC SERPL CALC-MCNC: 82 MG/DL
LYMPHOCYTES # BLD AUTO: 1.32 X10*3/UL (ref 0.8–3)
LYMPHOCYTES NFR BLD AUTO: 27.3 %
MCH RBC QN AUTO: 26.6 PG (ref 26–34)
MCHC RBC AUTO-ENTMCNC: 34.2 G/DL (ref 32–36)
MCV RBC AUTO: 78 FL (ref 80–100)
MONOCYTES # BLD AUTO: 0.58 X10*3/UL (ref 0.05–0.8)
MONOCYTES NFR BLD AUTO: 12 %
NEUTROPHILS # BLD AUTO: 2.82 X10*3/UL (ref 1.6–5.5)
NEUTROPHILS NFR BLD AUTO: 58.3 %
NON HDL CHOLESTEROL: 105 MG/DL (ref 0–149)
NRBC BLD-RTO: 0 /100 WBCS (ref 0–0)
PLATELET # BLD AUTO: 198 X10*3/UL (ref 150–450)
POC FINGERSTICK BLOOD GLUCOSE: 182 MG/DL (ref 70–100)
POC HEMOGLOBIN A1C: 6.6 % (ref 4.2–6.5)
POTASSIUM SERPL-SCNC: 4.2 MMOL/L (ref 3.5–5.3)
RBC # BLD AUTO: 5.38 X10*6/UL (ref 4.5–5.9)
SODIUM SERPL-SCNC: 137 MMOL/L (ref 136–145)
T4 FREE SERPL-MCNC: 1.37 NG/DL (ref 0.78–1.48)
TRIGL SERPL-MCNC: 115 MG/DL (ref 0–149)
TSH SERPL-ACNC: 0.3 MIU/L (ref 0.44–3.98)
VLDL: 23 MG/DL (ref 0–40)
WBC # BLD AUTO: 4.8 X10*3/UL (ref 4.4–11.3)

## 2024-04-30 PROCEDURE — 36415 COLL VENOUS BLD VENIPUNCTURE: CPT

## 2024-04-30 PROCEDURE — G0439 PPPS, SUBSEQ VISIT: HCPCS | Performed by: INTERNAL MEDICINE

## 2024-04-30 PROCEDURE — 1160F RVW MEDS BY RX/DR IN RCRD: CPT | Performed by: INTERNAL MEDICINE

## 2024-04-30 PROCEDURE — 1170F FXNL STATUS ASSESSED: CPT | Performed by: INTERNAL MEDICINE

## 2024-04-30 PROCEDURE — 1036F TOBACCO NON-USER: CPT | Performed by: INTERNAL MEDICINE

## 2024-04-30 PROCEDURE — 1124F ACP DISCUSS-NO DSCNMKR DOCD: CPT | Performed by: INTERNAL MEDICINE

## 2024-04-30 PROCEDURE — 84439 ASSAY OF FREE THYROXINE: CPT

## 2024-04-30 PROCEDURE — 3008F BODY MASS INDEX DOCD: CPT | Performed by: INTERNAL MEDICINE

## 2024-04-30 PROCEDURE — 83036 HEMOGLOBIN GLYCOSYLATED A1C: CPT | Performed by: INTERNAL MEDICINE

## 2024-04-30 PROCEDURE — 1159F MED LIST DOCD IN RCRD: CPT | Performed by: INTERNAL MEDICINE

## 2024-04-30 PROCEDURE — 82962 GLUCOSE BLOOD TEST: CPT | Performed by: INTERNAL MEDICINE

## 2024-04-30 PROCEDURE — 80061 LIPID PANEL: CPT

## 2024-04-30 PROCEDURE — 4010F ACE/ARB THERAPY RXD/TAKEN: CPT | Performed by: INTERNAL MEDICINE

## 2024-04-30 PROCEDURE — 85025 COMPLETE CBC W/AUTO DIFF WBC: CPT

## 2024-04-30 PROCEDURE — 80048 BASIC METABOLIC PNL TOTAL CA: CPT

## 2024-04-30 PROCEDURE — 1126F AMNT PAIN NOTED NONE PRSNT: CPT | Performed by: INTERNAL MEDICINE

## 2024-04-30 PROCEDURE — 84443 ASSAY THYROID STIM HORMONE: CPT

## 2024-04-30 PROCEDURE — 3044F HG A1C LEVEL LT 7.0%: CPT | Performed by: INTERNAL MEDICINE

## 2024-04-30 ASSESSMENT — ENCOUNTER SYMPTOMS
ACTIVITY CHANGE: 0
ABDOMINAL PAIN: 0
DIARRHEA: 0
BACK PAIN: 0
DIAPHORESIS: 0
WOUND: 0
DYSURIA: 0
NAUSEA: 0
NECK PAIN: 0
POLYPHAGIA: 0
EYE DISCHARGE: 0
ANAL BLEEDING: 0
DIFFICULTY URINATING: 0
MYALGIAS: 0
TREMORS: 0
CONSTIPATION: 0
ARTHRALGIAS: 0
COLOR CHANGE: 0
APPETITE CHANGE: 0
CHILLS: 0
EYE REDNESS: 0
FACIAL ASYMMETRY: 0
SPEECH DIFFICULTY: 0
DIZZINESS: 0
CHOKING: 0
FREQUENCY: 0
WEAKNESS: 0
STRIDOR: 0
PALPITATIONS: 0
NUMBNESS: 0
PHOTOPHOBIA: 0
NECK STIFFNESS: 0
FATIGUE: 0
SHORTNESS OF BREATH: 0
LIGHT-HEADEDNESS: 0
VOMITING: 0
SINUS PAIN: 0
HEADACHES: 0
SINUS PRESSURE: 0
WHEEZING: 0
SLEEP DISTURBANCE: 0
HEMATURIA: 0
FACIAL SWELLING: 0
FLANK PAIN: 0
VOICE CHANGE: 0
POLYDIPSIA: 0
RECTAL PAIN: 0
COUGH: 0
EYE PAIN: 0
TROUBLE SWALLOWING: 0
BRUISES/BLEEDS EASILY: 0
EYE ITCHING: 0
JOINT SWELLING: 0
SORE THROAT: 0
BLOOD IN STOOL: 0
ABDOMINAL DISTENTION: 0
CHEST TIGHTNESS: 0
ADENOPATHY: 0
RHINORRHEA: 0
SEIZURES: 0

## 2024-04-30 ASSESSMENT — ACTIVITIES OF DAILY LIVING (ADL)
BATHING: INDEPENDENT
DRESSING: INDEPENDENT
DOING_HOUSEWORK: INDEPENDENT
GROCERY_SHOPPING: INDEPENDENT
MANAGING_FINANCES: NEEDS ASSISTANCE
TAKING_MEDICATION: INDEPENDENT

## 2024-04-30 ASSESSMENT — PATIENT HEALTH QUESTIONNAIRE - PHQ9
2. FEELING DOWN, DEPRESSED OR HOPELESS: NOT AT ALL
1. LITTLE INTEREST OR PLEASURE IN DOING THINGS: NOT AT ALL
SUM OF ALL RESPONSES TO PHQ9 QUESTIONS 1 AND 2: 0

## 2024-04-30 ASSESSMENT — PAIN SCALES - GENERAL: PAINLEVEL: 0-NO PAIN

## 2024-04-30 NOTE — PROGRESS NOTES
Subjective   Patient ID: Elver Mc is a 73 y.o. male who presents for Follow-up (Check hearing) and Medicare Annual Wellness Visit Subsequent.    Patient presents for wellness exam and follow-up.  His family reports that his memory loss is at baseline.  He is currently without new complaints.  He denies any headaches, no dizziness, no chest pain or shortness of breath.  He denies abdominal pain no nausea vomiting or diarrhea.  He reports no new musculoskeletal complaints..  He is complaining of mild hearing loss.         Review of Systems   Constitutional:  Negative for activity change, appetite change, chills, diaphoresis and fatigue.   HENT:  Positive for hearing loss. Negative for congestion, dental problem, drooling, ear discharge, ear pain, facial swelling, mouth sores, nosebleeds, postnasal drip, rhinorrhea, sinus pressure, sinus pain, sneezing, sore throat, tinnitus, trouble swallowing and voice change.    Eyes:  Negative for photophobia, pain, discharge, redness, itching and visual disturbance.   Respiratory:  Negative for cough, choking, chest tightness, shortness of breath, wheezing and stridor.    Cardiovascular:  Negative for chest pain, palpitations and leg swelling.   Gastrointestinal:  Negative for abdominal distention, abdominal pain, anal bleeding, blood in stool, constipation, diarrhea, nausea, rectal pain and vomiting.   Endocrine: Negative for cold intolerance, heat intolerance, polydipsia, polyphagia and polyuria.   Genitourinary:  Negative for decreased urine volume, difficulty urinating, dysuria, enuresis, flank pain, frequency, genital sores, hematuria and urgency.   Musculoskeletal:  Negative for arthralgias, back pain, gait problem, joint swelling, myalgias, neck pain and neck stiffness.   Skin:  Negative for color change, pallor, rash and wound.   Neurological:  Negative for dizziness, tremors, seizures, syncope, facial asymmetry, speech difficulty, weakness, light-headedness,  numbness and headaches.   Hematological:  Negative for adenopathy. Does not bruise/bleed easily.   Psychiatric/Behavioral:  Negative for sleep disturbance.        Objective   Wt 106 kg (233 lb 8 oz)   BMI 35.50 kg/m²     Physical Exam  Constitutional:       Appearance: Normal appearance.   HENT:      Right Ear: Tympanic membrane normal.      Left Ear: Tympanic membrane normal.   Cardiovascular:      Rate and Rhythm: Normal rate and regular rhythm.      Heart sounds: No murmur heard.     No gallop.   Pulmonary:      Effort: No respiratory distress.      Breath sounds: No wheezing or rales.   Abdominal:      General: There is no distension.      Palpations: There is no mass.      Tenderness: There is no abdominal tenderness. There is no guarding.   Musculoskeletal:      Right lower leg: No edema.      Left lower leg: No edema.   Neurological:      Mental Status: He is alert.         Assessment/Plan   Diagnoses and all orders for this visit:  Hearing loss, unspecified hearing loss type, unspecified laterality-refer to audiology for further evaluation  -     Referral to Audiology; Future  Type 2 diabetes mellitus with other specified complication, without long-term current use of insulin (Multi)-hemoglobin A1c was 6.6.  Will continue with present management.  Ophthalmology appointment has been done  -     POCT glycosylated hemoglobin (Hb A1C) manually resulted  -     POCT Fingerstick Glucose manually resulted  Benign essential hypertension-stable on present medication  -     Basic Metabolic Panel; Future  Mixed hyperlipidemia-check lipid profile  Cardiomyopathy, unspecified type (Multi)  Atrial fibrillation, unspecified type (Multi)-rate is controlled.  Will continue with anticoagulation  Anemia, unspecified type-recheck CBC  -     CBC and Auto Differential; Future  Prostate cancer (Multi)-follow-up with urology  Localized, primary osteoarthritis of hand, unspecified laterality-stable symptoms  Low TSH level-recheck  TSH  -     TSH with reflex to Free T4 if abnormal; Future  Malaise  -     TSH with reflex to Free T4 if abnormal; Future  Severe obesity (BMI 35.0-35.9 with comorbidity) (Multi)-diet and exercise modify risk  Cerebrovascular accident (CVA), unspecified mechanism (Multi)--modify risk factors.  Health maintenance-colonoscopy has been done.  Immunizations are up-to-date.  Advance care planning discussed.  Eye and dental have been done  Dementia-he will follow-up with neurology.  Renal insufficiency-we will recheck creatinine.

## 2024-05-06 ENCOUNTER — ANTICOAGULATION - WARFARIN VISIT (OUTPATIENT)
Dept: CARDIOLOGY | Facility: CLINIC | Age: 74
End: 2024-05-06
Payer: MEDICARE

## 2024-05-06 DIAGNOSIS — I48.92 ATRIAL FLUTTER, UNSPECIFIED TYPE (MULTI): ICD-10-CM

## 2024-05-06 DIAGNOSIS — Z79.01 LONG TERM (CURRENT) USE OF ANTICOAGULANTS: ICD-10-CM

## 2024-05-06 DIAGNOSIS — I48.91 ATRIAL FIBRILLATION, UNSPECIFIED TYPE (MULTI): Primary | ICD-10-CM

## 2024-05-06 LAB
POC INR: 2.5
POC PROTHROMBIN TIME: NORMAL

## 2024-05-06 PROCEDURE — 99211 OFF/OP EST MAY X REQ PHY/QHP: CPT

## 2024-05-06 PROCEDURE — 85610 PROTHROMBIN TIME: CPT | Mod: QW

## 2024-05-06 NOTE — PROGRESS NOTES
Patient identification verified with 2 identifiers.    Location: [facility] - [address] [address] [address] [place], [place] [address] [phone]     Referring Physician: DR. STEPHEN ESCALANTE  Enrollment/ Re-enrollment date: 3/25/2025   INR Goal: 2.0-3.0  INR monitoring is per [facility] protocol.  Anticoagulation Medication: warfarin  Indication: Atrial Fibrillation/Atrial Flutter    Subjective   Bleeding signs/symptoms: No    Bruising: No   Major bleeding event: No  Thrombosis signs/symptoms: No  Thromboembolic event: No  Missed doses: No  Extra doses: No  Medication changes: Yes  PT STARTED NEW MEDICATION FOR DIMENTIA 24  Dietary changes: No  Change in health: No  Change in activity: No  Alcohol: No  Other concerns: No    Upcoming Procedures:  Does the Patient Have any upcoming procedures that require interruption in anticoagulation therapy? no  Does the patient require bridging? no      Anticoagulation Summary  As of 2024      INR goal:  2.0-3.0   TTR:  49.1% (7.4 mo)   INR used for dosin.50 (2024)   Weekly warfarin total:  60 mg               Assessment/Plan   Therapeutic     1. New dose: no change    2. Next INR: 1 week      Education provided to patient during the visit:  Patient instructed to call in interim with questions, concerns and changes.   Patient educated on interactions between medications and warfarin.   Patient educated on dietary consistency in vitamin k consumption.   Patient educated on affects of alcohol consumption while taking warfarin.   Patient educated on signs of bleeding/clotting.   Patient educated on compliance with dosing, follow up appointments, and prescribed plan of care.

## 2024-05-13 ENCOUNTER — ANTICOAGULATION - WARFARIN VISIT (OUTPATIENT)
Dept: CARDIOLOGY | Facility: CLINIC | Age: 74
End: 2024-05-13
Payer: MEDICARE

## 2024-05-13 DIAGNOSIS — Z79.01 LONG TERM (CURRENT) USE OF ANTICOAGULANTS: ICD-10-CM

## 2024-05-13 DIAGNOSIS — I48.92 ATRIAL FLUTTER, UNSPECIFIED TYPE (MULTI): ICD-10-CM

## 2024-05-13 DIAGNOSIS — I48.91 ATRIAL FIBRILLATION, UNSPECIFIED TYPE (MULTI): Primary | ICD-10-CM

## 2024-05-13 LAB
POC INR: 2.6
POC PROTHROMBIN TIME: NORMAL

## 2024-05-13 PROCEDURE — 85610 PROTHROMBIN TIME: CPT | Mod: QW

## 2024-05-13 PROCEDURE — 99211 OFF/OP EST MAY X REQ PHY/QHP: CPT

## 2024-05-13 NOTE — PROGRESS NOTES
Patient identification verified with 2 identifiers.    Location: [facility] - [address] [address] [place], [place] [address] [phone]     Referring Physician: DR. STEPHEN ESCALANTE  Enrollment/ Re-enrollment date: 3/25/2025   INR Goal: 2.0-3.0  INR monitoring is per [facility] protocol.  Anticoagulation Medication: warfarin  Indication: Atrial Fibrillation/Atrial Flutter    Subjective   Bleeding signs/symptoms: No    Bruising: No   Major bleeding event: No  Thrombosis signs/symptoms: No  Thromboembolic event: No  Missed doses: No  Extra doses: No  Medication changes: No  Dietary changes: No  Change in health: No  Change in activity: No  Alcohol: No  Other concerns: No    Upcoming Procedures:  Does the Patient Have any upcoming procedures that require interruption in anticoagulation therapy? no  Does the patient require bridging? no      Anticoagulation Summary  As of 2024      INR goal:  2.0-3.0   TTR:  50.7% (7.6 mo)   INR used for dosin.60 (2024)   Weekly warfarin total:  60 mg               Assessment/Plan   Therapeutic     1. New dose: no change    2. Next INR: 2 weeks      Education provided to patient during the visit:  Patient instructed to call in interim with questions, concerns and changes.   Patient educated on compliance with dosing, follow up appointments, and prescribed plan of care.

## 2024-05-23 ENCOUNTER — CLINICAL SUPPORT (OUTPATIENT)
Dept: AUDIOLOGY | Facility: CLINIC | Age: 74
End: 2024-05-23
Payer: MEDICARE

## 2024-05-23 DIAGNOSIS — H90.3 SENSORINEURAL HEARING LOSS, BILATERAL: Primary | ICD-10-CM

## 2024-05-23 DIAGNOSIS — H91.90 HEARING LOSS, UNSPECIFIED HEARING LOSS TYPE, UNSPECIFIED LATERALITY: ICD-10-CM

## 2024-05-23 DIAGNOSIS — E78.5 HYPERLIPIDEMIA, UNSPECIFIED HYPERLIPIDEMIA TYPE: ICD-10-CM

## 2024-05-23 PROCEDURE — 92550 TYMPANOMETRY & REFLEX THRESH: CPT

## 2024-05-23 PROCEDURE — 92557 COMPREHENSIVE HEARING TEST: CPT

## 2024-05-23 NOTE — PROGRESS NOTES
ADULT AUDIOLOGY AUDIOMETRIC EVALUATION    Name:  Elver Mc  :  1950  Age:  73 y.o.  Date of Evaluation:  2024    HISTORY  Elver Mc is seen today at the request of Manjinder Francis MD for an evaluation of hearing.  Patient arrives with no hearing concerns but wants to establish care. Patient denies ear pain, ear pressure, ear drainage, ear infections, ear surgeries, tinnitus, noise exposure, family history of hearing loss and dizziness/vertigo.      AUDIOLOGIC EVALUATION    OTOSCOPY  Otoscopic inspection revealed clear canals and visualization of the eardrum bilaterally.    IMMITTANCE  Normal tympanograms were obtained bilaterally, consistent with a normal moving eardrums and the likely absence of fluid.    Ipsilateral acoustic reflexes were tested and present  at 500Hz, 1000Hz, 2000Hz, and 4000Hz bilaterally.    AUDIOMETRIC TESTING  Pure tone audiometry conducted via insert headphones from 125 Hz - 8000 Hz with good reliability was consistent with:  Right ear:   Left ear:     SPEECH RECOGNITION TESTING (SRT)  SRT was in agreement with pure tone averages bilaterally ( Right: 10 dB HL, Left: 15 dB HL).    WORD RECOGNITION SCORE (WRS)  WRS was (100%) in the right ear and (96%) in the left ear using recorded ordered by difficulty NU6 word list.    IMPRESSIONS:  The results of today's assessment after consistent with:  -normal tympanograms  -present acoustic reflexes  - normal sloping to moderate sensorineural hearing loss bilaterally.     The patient was counseled with regard to the findings.He is not interested in hearing aids at this time.    RECOMMENDATIONS:  Treatment Plan:.  Follow up with ENT/PCP as recommended.  Annual hearing assessments as recommended. Follow up sooner if patient feels hearing or symptoms have changed.  Consider amplification if communication becomes difficult.   Contact the clinic with questions or concerns at [phone].       Wesley Sue, CCC-A,  [facility]  Licensed Audiologist  Certified Occupational Hearing Conservationist

## 2024-05-24 RX ORDER — ATORVASTATIN CALCIUM 80 MG/1
80 TABLET, FILM COATED ORAL DAILY
Qty: 100 TABLET | Refills: 2 | Status: SHIPPED | OUTPATIENT
Start: 2024-05-24

## 2024-05-29 ENCOUNTER — ANTICOAGULATION - WARFARIN VISIT (OUTPATIENT)
Dept: CARDIOLOGY | Facility: CLINIC | Age: 74
End: 2024-05-29
Payer: MEDICARE

## 2024-05-29 DIAGNOSIS — Z79.01 LONG TERM (CURRENT) USE OF ANTICOAGULANTS: ICD-10-CM

## 2024-05-29 DIAGNOSIS — I48.91 ATRIAL FIBRILLATION, UNSPECIFIED TYPE (MULTI): Primary | ICD-10-CM

## 2024-05-29 DIAGNOSIS — I48.92 ATRIAL FLUTTER, UNSPECIFIED TYPE (MULTI): ICD-10-CM

## 2024-05-29 LAB
POC INR: 2.1
POC PROTHROMBIN TIME: NORMAL

## 2024-05-29 PROCEDURE — 99211 OFF/OP EST MAY X REQ PHY/QHP: CPT

## 2024-05-29 PROCEDURE — 85610 PROTHROMBIN TIME: CPT | Mod: QW

## 2024-05-29 NOTE — PROGRESS NOTES
Patient identification verified with 2 identifiers.    Location: [facility] - [address] [address] [place], [place] [address] [phone]     Referring Physician: DR. STEPHEN ESCALANTE  Enrollment/ Re-enrollment date: 3/25/2025   INR Goal: 2.0-3.0  INR monitoring is per [facility] protocol.  Anticoagulation Medication: warfarin  Indication: Atrial Fibrillation/Atrial Flutter    Subjective   Bleeding signs/symptoms: No    Bruising: No   Major bleeding event: No  Thrombosis signs/symptoms: No  Thromboembolic event: No  Missed doses: No  Extra doses: No  Medication changes: No  Dietary changes: No  Change in health: No  Change in activity: No  Alcohol: No  Other concerns: No    Upcoming Procedures:  Does the Patient Have any upcoming procedures that require interruption in anticoagulation therapy? no  Does the patient require bridging? no      Anticoagulation Summary  As of 2024      INR goal:  2.0-3.0   TTR:  53.9% (8.1 mo)   INR used for dosin.10 (2024)   Weekly warfarin total:  60 mg               Assessment/Plan   Therapeutic     1. New dose: no change    2. Next INR: 1 month      Education provided to patient during the visit:  Patient instructed to call in interim with questions, concerns and changes.   Patient educated on compliance with dosing, follow up appointments, and prescribed plan of care.

## 2024-05-30 DIAGNOSIS — I10 BENIGN ESSENTIAL HYPERTENSION: ICD-10-CM

## 2024-06-03 RX ORDER — LISINOPRIL 20 MG/1
20 TABLET ORAL DAILY
Qty: 100 TABLET | Refills: 3 | Status: SHIPPED | OUTPATIENT
Start: 2024-06-03

## 2024-06-24 ENCOUNTER — ANTICOAGULATION - WARFARIN VISIT (OUTPATIENT)
Dept: CARDIOLOGY | Facility: CLINIC | Age: 74
End: 2024-06-24
Payer: MEDICARE

## 2024-06-24 DIAGNOSIS — Z79.01 LONG TERM (CURRENT) USE OF ANTICOAGULANTS: ICD-10-CM

## 2024-06-24 DIAGNOSIS — I48.91 ATRIAL FIBRILLATION, UNSPECIFIED TYPE (MULTI): Primary | ICD-10-CM

## 2024-06-24 DIAGNOSIS — I48.92 ATRIAL FLUTTER, UNSPECIFIED TYPE (MULTI): ICD-10-CM

## 2024-06-24 LAB
POC INR: 2.4
POC PROTHROMBIN TIME: NORMAL

## 2024-06-24 PROCEDURE — 85610 PROTHROMBIN TIME: CPT | Mod: QW

## 2024-06-24 PROCEDURE — 99211 OFF/OP EST MAY X REQ PHY/QHP: CPT

## 2024-06-24 NOTE — PROGRESS NOTES
Patient identification verified with 2 identifiers.    Location: [facility] - [address] [address] [place], [place] [address] [phone]     Referring Physician: DR. STEPHEN ESCALANTE  Enrollment/ Re-enrollment date: 3/25/2025   INR Goal: 2.0-3.0  INR monitoring is per [facility] protocol.  Anticoagulation Medication: warfarin  Indication: Atrial Fibrillation/Atrial Flutter    Subjective   Bleeding signs/symptoms: No    Bruising: No   Major bleeding event: No  Thrombosis signs/symptoms: No  Thromboembolic event: No  Missed doses: No  Extra doses: No  Medication changes: No  Dietary changes: No  Change in health: No  Change in activity: No  Alcohol: No  Other concerns: No    Upcoming Procedures:  Does the Patient Have any upcoming procedures that require interruption in anticoagulation therapy? no  Does the patient require bridging? no      Anticoagulation Summary  As of 2024      INR goal:  2.0-3.0   TTR:  58.4% (9 mo)   INR used for dosin.40 (2024)   Weekly warfarin total:  60 mg               Assessment/Plan   Therapeutic     1. New dose: no change    2. Next INR: 1 month      Education provided to patient during the visit:  Patient instructed to call in interim with questions, concerns and changes.   Patient educated on interactions between medications and warfarin.   Patient educated on compliance with dosing, follow up appointments, and prescribed plan of care.

## 2024-07-17 DIAGNOSIS — I10 BENIGN ESSENTIAL HYPERTENSION: ICD-10-CM

## 2024-07-18 RX ORDER — DILTIAZEM HYDROCHLORIDE 240 MG/1
240 CAPSULE, COATED, EXTENDED RELEASE ORAL DAILY
Qty: 100 CAPSULE | Refills: 2 | Status: SHIPPED | OUTPATIENT
Start: 2024-07-18

## 2024-07-21 DIAGNOSIS — I48.91 ATRIAL FIBRILLATION, UNSPECIFIED TYPE (MULTI): ICD-10-CM

## 2024-07-22 ENCOUNTER — ANTICOAGULATION - WARFARIN VISIT (OUTPATIENT)
Dept: CARDIOLOGY | Facility: CLINIC | Age: 74
End: 2024-07-22
Payer: MEDICARE

## 2024-07-22 DIAGNOSIS — I48.92 ATRIAL FLUTTER, UNSPECIFIED TYPE (MULTI): ICD-10-CM

## 2024-07-22 DIAGNOSIS — Z79.01 LONG TERM (CURRENT) USE OF ANTICOAGULANTS: ICD-10-CM

## 2024-07-22 DIAGNOSIS — I48.91 ATRIAL FIBRILLATION, UNSPECIFIED TYPE (MULTI): ICD-10-CM

## 2024-07-22 DIAGNOSIS — I48.91 ATRIAL FIBRILLATION, UNSPECIFIED TYPE (MULTI): Primary | ICD-10-CM

## 2024-07-22 LAB
POC INR: 2.6
POC PROTHROMBIN TIME: NORMAL

## 2024-07-22 PROCEDURE — 99211 OFF/OP EST MAY X REQ PHY/QHP: CPT | Performed by: INTERNAL MEDICINE

## 2024-07-22 PROCEDURE — 85610 PROTHROMBIN TIME: CPT | Mod: QW

## 2024-07-22 RX ORDER — WARFARIN 10 MG/1
TABLET ORAL
Qty: 90 TABLET | Refills: 3 | Status: SHIPPED | OUTPATIENT
Start: 2024-07-22

## 2024-07-22 RX ORDER — WARFARIN 10 MG/1
TABLET ORAL
Qty: 90 TABLET | Refills: 3 | Status: SHIPPED | OUTPATIENT
Start: 2024-07-22 | End: 2024-07-22 | Stop reason: SDUPTHER

## 2024-07-22 NOTE — PROGRESS NOTES
Patient identification verified with 2 identifiers.    Location: [facility] - [address] [address] [place], [place] [address] [phone]     Referring Physician: DR. STEPHEN ESCALANTE  Enrollment/ Re-enrollment date: 3/25/2025   INR Goal: 2.0-3.0  INR monitoring is per [facility] protocol.  Anticoagulation Medication: warfarin  Indication: Atrial Fibrillation/Atrial Flutter  Subjective   Bleeding signs/symptoms: No    Bruising: No   Major bleeding event: No  Thrombosis signs/symptoms: No  Thromboembolic event: No  Missed doses: No  Extra doses: No  Medication changes: No  Dietary changes: No  Change in health: No  Change in activity: No  Alcohol: No  Other concerns: No    Upcoming Procedures:  Does the Patient Have any upcoming procedures that require interruption in anticoagulation therapy? no  Does the patient require bridging? no      Anticoagulation Summary  As of 2024      INR goal:  2.0-3.0   TTR:  62.3% (9.9 mo)   INR used for dosin.60 (2024)   Weekly warfarin total:  60 mg               Assessment/Plan   Therapeutic     1. New dose: no change    2. Next INR: 4 weeks      Education provided to patient during the visit:  Patient instructed to call in interim with questions, concerns and changes.   Patient educated on compliance with dosing, follow up appointments, and prescribed plan of care.

## 2024-07-30 ENCOUNTER — APPOINTMENT (OUTPATIENT)
Dept: PRIMARY CARE | Facility: CLINIC | Age: 74
End: 2024-07-30
Payer: MEDICARE

## 2024-07-30 ENCOUNTER — LAB (OUTPATIENT)
Dept: LAB | Facility: LAB | Age: 74
End: 2024-07-30
Payer: MEDICARE

## 2024-07-30 VITALS
DIASTOLIC BLOOD PRESSURE: 86 MMHG | WEIGHT: 255 LBS | SYSTOLIC BLOOD PRESSURE: 140 MMHG | HEART RATE: 72 BPM | BODY MASS INDEX: 38.77 KG/M2

## 2024-07-30 DIAGNOSIS — E55.9 VITAMIN D DEFICIENCY: ICD-10-CM

## 2024-07-30 DIAGNOSIS — R53.81 MALAISE: ICD-10-CM

## 2024-07-30 DIAGNOSIS — I42.9 CARDIOMYOPATHY, UNSPECIFIED TYPE (MULTI): ICD-10-CM

## 2024-07-30 DIAGNOSIS — I48.91 ATRIAL FIBRILLATION, UNSPECIFIED TYPE (MULTI): ICD-10-CM

## 2024-07-30 DIAGNOSIS — E78.2 MIXED HYPERLIPIDEMIA: Primary | ICD-10-CM

## 2024-07-30 DIAGNOSIS — I10 BENIGN ESSENTIAL HYPERTENSION: ICD-10-CM

## 2024-07-30 DIAGNOSIS — Z12.5 SCREENING PSA (PROSTATE SPECIFIC ANTIGEN): ICD-10-CM

## 2024-07-30 DIAGNOSIS — E55.9 VITAMIN D DEFICIENCY: Primary | ICD-10-CM

## 2024-07-30 DIAGNOSIS — D64.9 ANEMIA, UNSPECIFIED TYPE: Primary | ICD-10-CM

## 2024-07-30 DIAGNOSIS — E11.69 TYPE 2 DIABETES MELLITUS WITH OTHER SPECIFIED COMPLICATION, WITHOUT LONG-TERM CURRENT USE OF INSULIN (MULTI): ICD-10-CM

## 2024-07-30 DIAGNOSIS — E78.2 MIXED HYPERLIPIDEMIA: ICD-10-CM

## 2024-07-30 DIAGNOSIS — E05.90 SUBCLINICAL HYPERTHYROIDISM: ICD-10-CM

## 2024-07-30 DIAGNOSIS — E66.01 CLASS 2 SEVERE OBESITY DUE TO EXCESS CALORIES WITH SERIOUS COMORBIDITY AND BODY MASS INDEX (BMI) OF 38.0 TO 38.9 IN ADULT (MULTI): ICD-10-CM

## 2024-07-30 DIAGNOSIS — C61 PROSTATE CANCER (MULTI): ICD-10-CM

## 2024-07-30 DIAGNOSIS — N18.31 CHRONIC KIDNEY DISEASE, STAGE 3A (MULTI): ICD-10-CM

## 2024-07-30 DIAGNOSIS — D64.9 ANEMIA, UNSPECIFIED TYPE: ICD-10-CM

## 2024-07-30 LAB
25(OH)D3 SERPL-MCNC: 16 NG/ML (ref 30–100)
ALBUMIN SERPL BCP-MCNC: 4.2 G/DL (ref 3.4–5)
ALP SERPL-CCNC: 80 U/L (ref 33–136)
ALT SERPL W P-5'-P-CCNC: 19 U/L (ref 10–52)
ANION GAP SERPL CALC-SCNC: 15 MMOL/L (ref 10–20)
APPEARANCE UR: CLEAR
AST SERPL W P-5'-P-CCNC: 17 U/L (ref 9–39)
BASOPHILS # BLD AUTO: 0.03 X10*3/UL (ref 0–0.1)
BASOPHILS NFR BLD AUTO: 0.6 %
BILIRUB SERPL-MCNC: 0.7 MG/DL (ref 0–1.2)
BILIRUB UR STRIP.AUTO-MCNC: NEGATIVE MG/DL
BNP SERPL-MCNC: 25 PG/ML (ref 0–99)
BUN SERPL-MCNC: 22 MG/DL (ref 6–23)
CALCIUM SERPL-MCNC: 9.3 MG/DL (ref 8.6–10.6)
CHLORIDE SERPL-SCNC: 108 MMOL/L (ref 98–107)
CHOLEST SERPL-MCNC: 134 MG/DL (ref 0–199)
CHOLESTEROL/HDL RATIO: 2.8
CO2 SERPL-SCNC: 19 MMOL/L (ref 21–32)
COLOR UR: ABNORMAL
CREAT SERPL-MCNC: 1.78 MG/DL (ref 0.5–1.3)
CREAT UR-MCNC: 110.1 MG/DL (ref 20–370)
EGFRCR SERPLBLD CKD-EPI 2021: 40 ML/MIN/1.73M*2
EOSINOPHIL # BLD AUTO: 0.05 X10*3/UL (ref 0–0.4)
EOSINOPHIL NFR BLD AUTO: 0.9 %
ERYTHROCYTE [DISTWIDTH] IN BLOOD BY AUTOMATED COUNT: 17.3 % (ref 11.5–14.5)
FERRITIN SERPL-MCNC: 497 NG/ML (ref 20–300)
GLUCOSE SERPL-MCNC: 103 MG/DL (ref 74–99)
GLUCOSE UR STRIP.AUTO-MCNC: ABNORMAL MG/DL
HCT VFR BLD AUTO: 44.6 % (ref 41–52)
HDLC SERPL-MCNC: 47.1 MG/DL
HGB BLD-MCNC: 14.8 G/DL (ref 13.5–17.5)
IMM GRANULOCYTES # BLD AUTO: 0.08 X10*3/UL (ref 0–0.5)
IMM GRANULOCYTES NFR BLD AUTO: 1.5 % (ref 0–0.9)
IRON SATN MFR SERPL: 26 % (ref 25–45)
IRON SERPL-MCNC: 89 UG/DL (ref 35–150)
KETONES UR STRIP.AUTO-MCNC: NEGATIVE MG/DL
LDLC SERPL CALC-MCNC: 69 MG/DL
LEUKOCYTE ESTERASE UR QL STRIP.AUTO: NEGATIVE
LYMPHOCYTES # BLD AUTO: 1.51 X10*3/UL (ref 0.8–3)
LYMPHOCYTES NFR BLD AUTO: 28.7 %
MCH RBC QN AUTO: 25.5 PG (ref 26–34)
MCHC RBC AUTO-ENTMCNC: 33.2 G/DL (ref 32–36)
MCV RBC AUTO: 77 FL (ref 80–100)
MICROALBUMIN UR-MCNC: 8.7 MG/L
MICROALBUMIN/CREAT UR: 7.9 UG/MG CREAT
MONOCYTES # BLD AUTO: 0.61 X10*3/UL (ref 0.05–0.8)
MONOCYTES NFR BLD AUTO: 11.6 %
NEUTROPHILS # BLD AUTO: 2.99 X10*3/UL (ref 1.6–5.5)
NEUTROPHILS NFR BLD AUTO: 56.7 %
NITRITE UR QL STRIP.AUTO: NEGATIVE
NON HDL CHOLESTEROL: 87 MG/DL (ref 0–149)
NRBC BLD-RTO: 0 /100 WBCS (ref 0–0)
PH UR STRIP.AUTO: 5.5 [PH]
PLATELET # BLD AUTO: 201 X10*3/UL (ref 150–450)
POC FINGERSTICK BLOOD GLUCOSE: 102 MG/DL (ref 70–100)
POC HEMOGLOBIN A1C: 7.1 % (ref 4.2–6.5)
POTASSIUM SERPL-SCNC: 4.6 MMOL/L (ref 3.5–5.3)
PROT SERPL-MCNC: 7.2 G/DL (ref 6.4–8.2)
PROT UR STRIP.AUTO-MCNC: NEGATIVE MG/DL
PSA SERPL-MCNC: 1.45 NG/ML
RBC # BLD AUTO: 5.8 X10*6/UL (ref 4.5–5.9)
RBC # UR STRIP.AUTO: NEGATIVE /UL
SODIUM SERPL-SCNC: 137 MMOL/L (ref 136–145)
SP GR UR STRIP.AUTO: 1.01
TIBC SERPL-MCNC: 337 UG/DL (ref 240–445)
TRIGL SERPL-MCNC: 89 MG/DL (ref 0–149)
TSH SERPL-ACNC: 0.62 MIU/L (ref 0.44–3.98)
UIBC SERPL-MCNC: 248 UG/DL (ref 110–370)
URATE SERPL-MCNC: 6.2 MG/DL (ref 4–7.5)
UROBILINOGEN UR STRIP.AUTO-MCNC: NORMAL MG/DL
VLDL: 18 MG/DL (ref 0–40)
WBC # BLD AUTO: 5.3 X10*3/UL (ref 4.4–11.3)

## 2024-07-30 PROCEDURE — 3044F HG A1C LEVEL LT 7.0%: CPT | Performed by: INTERNAL MEDICINE

## 2024-07-30 PROCEDURE — 80053 COMPREHEN METABOLIC PANEL: CPT

## 2024-07-30 PROCEDURE — 1126F AMNT PAIN NOTED NONE PRSNT: CPT | Performed by: INTERNAL MEDICINE

## 2024-07-30 PROCEDURE — 85025 COMPLETE CBC W/AUTO DIFF WBC: CPT

## 2024-07-30 PROCEDURE — 3061F NEG MICROALBUMINURIA REV: CPT | Performed by: INTERNAL MEDICINE

## 2024-07-30 PROCEDURE — 83036 HEMOGLOBIN GLYCOSYLATED A1C: CPT | Performed by: INTERNAL MEDICINE

## 2024-07-30 PROCEDURE — 83880 ASSAY OF NATRIURETIC PEPTIDE: CPT

## 2024-07-30 PROCEDURE — 83540 ASSAY OF IRON: CPT

## 2024-07-30 PROCEDURE — 82043 UR ALBUMIN QUANTITATIVE: CPT

## 2024-07-30 PROCEDURE — 84550 ASSAY OF BLOOD/URIC ACID: CPT

## 2024-07-30 PROCEDURE — 3077F SYST BP >= 140 MM HG: CPT | Performed by: INTERNAL MEDICINE

## 2024-07-30 PROCEDURE — 82962 GLUCOSE BLOOD TEST: CPT | Performed by: INTERNAL MEDICINE

## 2024-07-30 PROCEDURE — 82728 ASSAY OF FERRITIN: CPT

## 2024-07-30 PROCEDURE — 1160F RVW MEDS BY RX/DR IN RCRD: CPT | Performed by: INTERNAL MEDICINE

## 2024-07-30 PROCEDURE — 3079F DIAST BP 80-89 MM HG: CPT | Performed by: INTERNAL MEDICINE

## 2024-07-30 PROCEDURE — 82306 VITAMIN D 25 HYDROXY: CPT

## 2024-07-30 PROCEDURE — 82570 ASSAY OF URINE CREATININE: CPT

## 2024-07-30 PROCEDURE — 80061 LIPID PANEL: CPT

## 2024-07-30 PROCEDURE — 36415 COLL VENOUS BLD VENIPUNCTURE: CPT

## 2024-07-30 PROCEDURE — 84443 ASSAY THYROID STIM HORMONE: CPT

## 2024-07-30 PROCEDURE — 1124F ACP DISCUSS-NO DSCNMKR DOCD: CPT | Performed by: INTERNAL MEDICINE

## 2024-07-30 PROCEDURE — 99213 OFFICE O/P EST LOW 20 MIN: CPT | Performed by: INTERNAL MEDICINE

## 2024-07-30 PROCEDURE — 3048F LDL-C <100 MG/DL: CPT | Performed by: INTERNAL MEDICINE

## 2024-07-30 PROCEDURE — 1159F MED LIST DOCD IN RCRD: CPT | Performed by: INTERNAL MEDICINE

## 2024-07-30 PROCEDURE — G0103 PSA SCREENING: HCPCS

## 2024-07-30 PROCEDURE — 4010F ACE/ARB THERAPY RXD/TAKEN: CPT | Performed by: INTERNAL MEDICINE

## 2024-07-30 PROCEDURE — 83550 IRON BINDING TEST: CPT

## 2024-07-30 PROCEDURE — 81003 URINALYSIS AUTO W/O SCOPE: CPT

## 2024-07-30 RX ORDER — ERGOCALCIFEROL 1.25 MG/1
50000 CAPSULE ORAL
Qty: 12 CAPSULE | Refills: 0 | Status: SHIPPED | OUTPATIENT
Start: 2024-08-04 | End: 2024-10-27

## 2024-07-30 ASSESSMENT — ENCOUNTER SYMPTOMS
POLYDIPSIA: 0
HEMATURIA: 0
UNEXPECTED WEIGHT CHANGE: 1
MYALGIAS: 0
DIARRHEA: 0
CHOKING: 0
RECTAL PAIN: 0
LIGHT-HEADEDNESS: 0
SLEEP DISTURBANCE: 0
POLYPHAGIA: 0
SPEECH DIFFICULTY: 0
DIZZINESS: 0
NECK STIFFNESS: 0
BRUISES/BLEEDS EASILY: 0
DYSURIA: 0
EYE PAIN: 0
FACIAL SWELLING: 0
JOINT SWELLING: 0
ACTIVITY CHANGE: 0
SORE THROAT: 0
FACIAL ASYMMETRY: 0
PHOTOPHOBIA: 0
CHILLS: 0
COUGH: 0
STRIDOR: 0
ANAL BLEEDING: 0
SHORTNESS OF BREATH: 0
SINUS PRESSURE: 0
BLOOD IN STOOL: 0
WOUND: 0
FATIGUE: 0
DIAPHORESIS: 0
SEIZURES: 0
ABDOMINAL PAIN: 0
NECK PAIN: 0
VOICE CHANGE: 0
CHEST TIGHTNESS: 0
COLOR CHANGE: 0
EYE REDNESS: 0
FLANK PAIN: 0
FREQUENCY: 0
NUMBNESS: 0
ADENOPATHY: 0
APPETITE CHANGE: 0
VOMITING: 0
TREMORS: 0
HEADACHES: 0
WHEEZING: 0
RHINORRHEA: 0
ABDOMINAL DISTENTION: 0
TROUBLE SWALLOWING: 0
ARTHRALGIAS: 0
EYE DISCHARGE: 0
DIFFICULTY URINATING: 0
SINUS PAIN: 0
EYE ITCHING: 0
NAUSEA: 0
CONSTIPATION: 0
WEAKNESS: 0
PALPITATIONS: 0
BACK PAIN: 0

## 2024-07-30 ASSESSMENT — PAIN SCALES - GENERAL: PAINLEVEL: 0-NO PAIN

## 2024-07-30 NOTE — PROGRESS NOTES
Subjective   Patient ID: Elver Mc is a 73 y.o. male who presents for Follow-up.    Patient presents for follow-up.  He has been compliant with his medications but not diet or exercise.  He has gained weight.  His family reports that his memory loss is at baseline.  He overall feels well.  He denies any headaches, no dizziness, no sinus problems, no chest pain or shortness of breath.  He denies abdominal pain no nausea vomiting or diarrhea.  He reports no pain.         Review of Systems   Constitutional:  Positive for unexpected weight change. Negative for activity change, appetite change, chills, diaphoresis and fatigue.   HENT:  Negative for congestion, dental problem, drooling, ear discharge, ear pain, facial swelling, hearing loss, mouth sores, nosebleeds, postnasal drip, rhinorrhea, sinus pressure, sinus pain, sneezing, sore throat, tinnitus, trouble swallowing and voice change.    Eyes:  Negative for photophobia, pain, discharge, redness, itching and visual disturbance.   Respiratory:  Negative for cough, choking, chest tightness, shortness of breath, wheezing and stridor.    Cardiovascular:  Negative for chest pain, palpitations and leg swelling.   Gastrointestinal:  Negative for abdominal distention, abdominal pain, anal bleeding, blood in stool, constipation, diarrhea, nausea, rectal pain and vomiting.   Endocrine: Negative for cold intolerance, heat intolerance, polydipsia, polyphagia and polyuria.   Genitourinary:  Negative for decreased urine volume, difficulty urinating, dysuria, enuresis, flank pain, frequency, genital sores, hematuria and urgency.   Musculoskeletal:  Negative for arthralgias, back pain, gait problem, joint swelling, myalgias, neck pain and neck stiffness.   Skin:  Negative for color change, pallor, rash and wound.   Neurological:  Negative for dizziness, tremors, seizures, syncope, facial asymmetry, speech difficulty, weakness, light-headedness, numbness and headaches.    Hematological:  Negative for adenopathy. Does not bruise/bleed easily.   Psychiatric/Behavioral:  Negative for sleep disturbance.        Objective   /86   Pulse 72   Wt 116 kg (255 lb)   BMI 38.77 kg/m²     Physical Exam  Constitutional:       Appearance: Normal appearance.   Cardiovascular:      Rate and Rhythm: Normal rate and regular rhythm.      Heart sounds: No murmur heard.     No gallop.   Pulmonary:      Effort: No respiratory distress.      Breath sounds: No wheezing or rales.   Abdominal:      General: There is no distension.      Palpations: There is no mass.      Tenderness: There is no abdominal tenderness. There is no guarding.   Musculoskeletal:      Right lower leg: No edema.      Left lower leg: No edema.   Neurological:      Mental Status: He is alert.         Assessment/Plan   Diagnoses and all orders for this visit:  Mixed hyperlipidemia-we will check a lipid profile  -     Lipid Panel; Future  Type 2 diabetes mellitus with other specified complication, without long-term current use of insulin (Multi)-hemoglobin A1c was 7.1.  He will diet and exercise.  Ophthalmology appointment has been done.  -     POCT glycosylated hemoglobin (Hb A1C) manually resulted  -     POCT Fingerstick Glucose manually resulted  Benign essential hypertension-low-salt diet and exercise  -     Albumin-Creatinine Ratio, Urine Random; Future  -     Comprehensive Metabolic Panel; Future  -     Uric Acid; Future  -     Urinalysis with Reflex Microscopic; Future  Malaise  -     CBC and Auto Differential; Future  -     TSH with reflex to Free T4 if abnormal; Future  Vitamin D deficiency  -     Vitamin D 25-Hydroxy,Total (for eval of Vitamin D levels); Future  Screening PSA (prostate specific antigen)  -     Prostate Specific Antigen; Future  Cardiomyopathy, unspecified type (Multi)-follow-up with cardiology  -     B-type natriuretic peptide; Future  Atrial fibrillation, unspecified type (Multi)-rate is controlled.   Continue with anticoagulation  Class 2 severe obesity due to excess calories with serious comorbidity and body mass index (BMI) of 38.0 to 38.9 in adult (Multi)-diet and exercise  Chronic kidney disease, stage 3a (Multi) recheck creatinine.  Schedule appoint with nephrology  Prostate cancer (Multi)-recheck PSA  Subclinical hyperthyroidism-recheck TSH.  Health maintenance-colonoscopy has been.  Immunizations are up-to-date. Eye appt is pending. Dental has been done.

## 2024-08-19 ENCOUNTER — ANTICOAGULATION - WARFARIN VISIT (OUTPATIENT)
Dept: CARDIOLOGY | Facility: CLINIC | Age: 74
End: 2024-08-19
Payer: MEDICARE

## 2024-08-19 ENCOUNTER — APPOINTMENT (OUTPATIENT)
Dept: CARDIOLOGY | Facility: CLINIC | Age: 74
End: 2024-08-19
Payer: MEDICARE

## 2024-08-19 DIAGNOSIS — I48.91 ATRIAL FIBRILLATION, UNSPECIFIED TYPE (MULTI): Primary | ICD-10-CM

## 2024-08-19 DIAGNOSIS — Z79.01 LONG TERM (CURRENT) USE OF ANTICOAGULANTS: ICD-10-CM

## 2024-08-19 DIAGNOSIS — I48.92 ATRIAL FLUTTER, UNSPECIFIED TYPE (MULTI): ICD-10-CM

## 2024-08-19 LAB
POC INR: 3.6
POC PROTHROMBIN TIME: NORMAL

## 2024-08-19 PROCEDURE — 99211 OFF/OP EST MAY X REQ PHY/QHP: CPT

## 2024-08-19 PROCEDURE — 85610 PROTHROMBIN TIME: CPT | Mod: QW

## 2024-08-19 NOTE — PROGRESS NOTES
Patient identification verified with 2 identifiers.    Location: [facility] - [address] [address] [facility]. [place], [place] [address] [phone]     Referring Physician: Dr. Tang Pino  Enrollment/ Re-enrollment date: 3/25/2025   INR Goal: 2.0-3.0  INR monitoring is per [facility] protocol.  Anticoagulation Medication: warfarin  Indication: Atrial Fibrillation/Atrial Flutter    Subjective   Bleeding signs/symptoms: No    Bruising: No   Major bleeding event: No  Thrombosis signs/symptoms: No  Thromboembolic event: No  Missed doses: No  Extra doses: No  Medication changes: Yes  Pt started Vit D2 last week.  Dietary changes: No  Change in health: No  Change in activity: No  Alcohol: No  Other concerns: No    Upcoming Procedures:  Does the Patient Have any upcoming procedures that require interruption in anticoagulation therapy? no  Does the patient require bridging? no      Anticoagulation Summary  As of 8/19/2024      INR goal:  2.0-3.0   TTR:  62.3% (9.9 mo)   INR used for dosing:  --               Assessment/Plan   Supratherapeutic     1. New dose:  Reduce TWD 5%.     2. Next INR: 1 week      Education provided to patient during the visit:  Patient instructed to call in interim with questions, concerns and changes.   Patient educated on interactions between medications and warfarin.   Patient educated on dietary consistency in vitamin k consumption.   Patient educated on affects of alcohol consumption while taking warfarin.   Patient educated on signs of bleeding/clotting.   Patient educated on compliance with dosing, follow up appointments, and prescribed plan of care.

## 2024-08-26 ENCOUNTER — ANTICOAGULATION - WARFARIN VISIT (OUTPATIENT)
Dept: CARDIOLOGY | Facility: CLINIC | Age: 74
End: 2024-08-26
Payer: MEDICARE

## 2024-08-26 DIAGNOSIS — Z79.01 LONG TERM (CURRENT) USE OF ANTICOAGULANTS: ICD-10-CM

## 2024-08-26 DIAGNOSIS — I48.91 ATRIAL FIBRILLATION, UNSPECIFIED TYPE (MULTI): Primary | ICD-10-CM

## 2024-08-26 DIAGNOSIS — I48.92 ATRIAL FLUTTER, UNSPECIFIED TYPE (MULTI): ICD-10-CM

## 2024-08-26 LAB
POC INR: 2 (ref 2–3)
POC PROTHROMBIN TIME: NORMAL

## 2024-08-26 PROCEDURE — 99211 OFF/OP EST MAY X REQ PHY/QHP: CPT

## 2024-08-26 PROCEDURE — 85610 PROTHROMBIN TIME: CPT | Mod: QW

## 2024-08-26 NOTE — PROGRESS NOTES
Patient identification verified with 2 identifiers.    Location: [facility] - [address] [address] [facility]. [place], [place] [address] [phone]     Referring Physician: Dr. Tang Pino  Enrollment/ Re-enrollment date: 3/25/2025   INR Goal: 2.0-3.0  INR monitoring is per [facility] protocol.  Anticoagulation Medication: warfarin  Indication: Atrial Fibrillation/Atrial Flutter    Subjective   Bleeding signs/symptoms: No    Bruising: No   Major bleeding event: No  Thrombosis signs/symptoms: No  Thromboembolic event: No  Missed doses: No  Extra doses: No  Medication changes: No  Dietary changes: No  Change in health: No  Change in activity: No  Alcohol: No  Other concerns: No    Upcoming Procedures:  Does the Patient Have any upcoming procedures that require interruption in anticoagulation therapy? no  Does the patient require bridging? no      Anticoagulation Summary  As of 2024      INR goal:  2.0-3.0   TTR:  60.4% (11.1 mo)   INR used for dosin.00 (2024)   Weekly warfarin total:  55 mg               Assessment/Plan   Completed POCT in office but was unable to complete note at that time due to computer login issue. Called pt's son and confirmed INR 2.0 is in therapeutic range and gave verbal instruction to continue current dosing schedule. Pt's son Javier states understanding.   Therapeutic     1. New dose: no change    2. Next INR: 1 week      Education provided to patient during the visit:  Patient instructed to call in interim with questions, concerns and changes.   Patient educated on interactions between medications and warfarin.   Patient educated on dietary consistency in vitamin k consumption.   Patient educated on affects of alcohol consumption while taking warfarin.   Patient educated on signs of bleeding/clotting.   Patient educated on compliance with dosing, follow up appointments, and prescribed plan of care.

## 2024-08-30 DIAGNOSIS — I10 BENIGN ESSENTIAL HYPERTENSION: ICD-10-CM

## 2024-09-03 RX ORDER — TRIAMTERENE AND HYDROCHLOROTHIAZIDE 75; 50 MG/1; MG/1
1 TABLET ORAL DAILY
Qty: 100 TABLET | Refills: 2 | Status: SHIPPED | OUTPATIENT
Start: 2024-09-03

## 2024-09-04 ENCOUNTER — ANTICOAGULATION - WARFARIN VISIT (OUTPATIENT)
Dept: CARDIOLOGY | Facility: CLINIC | Age: 74
End: 2024-09-04
Payer: MEDICARE

## 2024-09-04 DIAGNOSIS — Z79.01 LONG TERM (CURRENT) USE OF ANTICOAGULANTS: ICD-10-CM

## 2024-09-04 DIAGNOSIS — I48.92 ATRIAL FLUTTER, UNSPECIFIED TYPE (MULTI): ICD-10-CM

## 2024-09-04 DIAGNOSIS — I48.91 ATRIAL FIBRILLATION, UNSPECIFIED TYPE (MULTI): Primary | ICD-10-CM

## 2024-09-04 LAB
POC INR: 2 (ref 2–3)
POC PROTHROMBIN TIME: NORMAL

## 2024-09-04 PROCEDURE — 85610 PROTHROMBIN TIME: CPT | Mod: QW

## 2024-09-04 PROCEDURE — 99211 OFF/OP EST MAY X REQ PHY/QHP: CPT

## 2024-09-04 NOTE — PROGRESS NOTES
Patient identification verified with 2 identifiers.    Location: [facility] - [address] [address] [facility]. [place], [place] [address] [phone]     Referring Physician: Dr. Tang Pino  Enrollment/ Re-enrollment date: 3/25/2025   INR Goal: 2.0-3.0  INR monitoring is per [facility] protocol.  Anticoagulation Medication: warfarin  Indication: Atrial Fibrillation/Atrial Flutter    Subjective   Bleeding signs/symptoms: No    Bruising: No   Major bleeding event: No  Thrombosis signs/symptoms: No  Thromboembolic event: No  Missed doses: No  Extra doses: No  Medication changes: No  Dietary changes: No  Change in health: No  Change in activity: No  Alcohol: No  Other concerns: No    Upcoming Procedures:  Does the Patient Have any upcoming procedures that require interruption in anticoagulation therapy? no  Does the patient require bridging? no      Anticoagulation Summary  As of 2024      INR goal:  2.0-3.0   TTR:  61.5% (11.4 mo)   INR used for dosin.00 (2024)   Weekly warfarin total:  55 mg               Assessment/Plan     Therapeutic     1. New dose: no change    2. Next INR: 2 weeks      Education provided to patient during the visit:  Patient instructed to call in interim with questions, concerns and changes.   Patient educated on interactions between medications and warfarin.   Patient educated on dietary consistency in vitamin k consumption.   Patient educated on affects of alcohol consumption while taking warfarin.   Patient educated on signs of bleeding/clotting.   Patient educated on compliance with dosing, follow up appointments, and prescribed plan of care.

## 2024-09-08 DIAGNOSIS — R41.3 MEMORY LOSS: ICD-10-CM

## 2024-09-10 ENCOUNTER — OFFICE VISIT (OUTPATIENT)
Dept: NEUROLOGY | Facility: HOSPITAL | Age: 74
End: 2024-09-10
Payer: MEDICARE

## 2024-09-10 VITALS
BODY MASS INDEX: 34.12 KG/M2 | HEART RATE: 85 BPM | TEMPERATURE: 98.3 F | WEIGHT: 230.38 LBS | RESPIRATION RATE: 18 BRPM | HEIGHT: 69 IN | SYSTOLIC BLOOD PRESSURE: 145 MMHG | DIASTOLIC BLOOD PRESSURE: 88 MMHG

## 2024-09-10 DIAGNOSIS — F03.A0 MILD DEMENTIA, UNSPECIFIED DEMENTIA TYPE, UNSPECIFIED WHETHER BEHAVIORAL, PSYCHOTIC, OR MOOD DISTURBANCE OR ANXIETY (MULTI): Primary | ICD-10-CM

## 2024-09-10 PROCEDURE — 3048F LDL-C <100 MG/DL: CPT | Performed by: PSYCHIATRY & NEUROLOGY

## 2024-09-10 PROCEDURE — 3061F NEG MICROALBUMINURIA REV: CPT | Performed by: PSYCHIATRY & NEUROLOGY

## 2024-09-10 PROCEDURE — 1036F TOBACCO NON-USER: CPT | Performed by: PSYCHIATRY & NEUROLOGY

## 2024-09-10 PROCEDURE — 3008F BODY MASS INDEX DOCD: CPT | Performed by: PSYCHIATRY & NEUROLOGY

## 2024-09-10 PROCEDURE — 99214 OFFICE O/P EST MOD 30 MIN: CPT | Performed by: PSYCHIATRY & NEUROLOGY

## 2024-09-10 PROCEDURE — 3077F SYST BP >= 140 MM HG: CPT | Performed by: PSYCHIATRY & NEUROLOGY

## 2024-09-10 PROCEDURE — 4010F ACE/ARB THERAPY RXD/TAKEN: CPT | Performed by: PSYCHIATRY & NEUROLOGY

## 2024-09-10 PROCEDURE — 3079F DIAST BP 80-89 MM HG: CPT | Performed by: PSYCHIATRY & NEUROLOGY

## 2024-09-10 PROCEDURE — 1159F MED LIST DOCD IN RCRD: CPT | Performed by: PSYCHIATRY & NEUROLOGY

## 2024-09-10 PROCEDURE — 3044F HG A1C LEVEL LT 7.0%: CPT | Performed by: PSYCHIATRY & NEUROLOGY

## 2024-09-10 PROCEDURE — 1126F AMNT PAIN NOTED NONE PRSNT: CPT | Performed by: PSYCHIATRY & NEUROLOGY

## 2024-09-10 RX ORDER — DONEPEZIL HYDROCHLORIDE 5 MG/1
5 TABLET, FILM COATED ORAL NIGHTLY
Qty: 80 TABLET | Refills: 3 | Status: SHIPPED | OUTPATIENT
Start: 2024-09-10

## 2024-09-10 ASSESSMENT — PAIN SCALES - GENERAL: PAINLEVEL: 0-NO PAIN

## 2024-09-10 NOTE — PROGRESS NOTES
Provider impressions:  Mr. Mc  is a 74 -year-old  man with 12  years of formal education and PMHx of stroke in 2006, Afib on Warfarin, cardiomyopathy HTN, HLD, CKD, T2DM and prostate cancer who is here today for a follow-up visit for mild dementia. He is accompanied by his daughter, Justin. He has been having STM issues for the last two years, he lives with his son who helps him with iADLs, neurological examination is reassuring, he scored 12/30 on MoCA last visit.  He continues donepezil with no reported side effects.  MRI brain wo contrast on 2/13/2024 showed mild brain parenchymal volume loss, small focus of encephalomalacia in medial left occipital lobe, chronic right thalamic infarct. There are scattered nonspecific white matter changes within the cerebral hemispheres bilaterally which while nonspecific, given the patient's age, likely represent sequelae of more remote small-vessel, no acute intracranial abnormalities. TSH and vitamin B12 recently checked and came back in normal range.  Given the history and today's evaluation, I suspect mild dementia with behavioral disturbance. He is stable from functional and cognitive standpoints.  Potential etiologies include Alzheimer's disease, vascular dementia, a multifactorial etiology can't be entirely ruled out.    PLAN:  Continue donepezil 5 mg daily    I recommended adequate control of vascular risk factors.  We discussed strategies to remain physically and mentally active.  Follow up in 6 months or earlier if needed.    Please call [phone] if you have any questions.     History of present illness:  Mr. Mc  is a 74 -year-old  man with 12  years of formal education and PMHx of stroke in 2006, Afib on Warfarin, cardiomyopathy HTN, HLD, CKD, T2DM and prostate cancer who is here today for a follow-up visit for mild dementia. He is accompanied by his daughter, Justin.    Interval history:   Appetite is good, sleep is fine, mood is good, no  "hallucinations, no falls since last visit.  He started on donepezil, no reported side effects. He is not driving anymore.   He has good control for urinary and bowel movements.    Brunilda has been noticing cognitive changes for less than a year. Onset was gradual, forgets scheduled events but uses a calendar, misplaces objects. Forgets conversations. Denies repeating self.   Denies any functional impairment. He lives with his son, Javier who manages finances and helps him with iADLs.     Mood is \"good.\". has not noticed any significant depressive symptoms. Gets frustrated with memory problems.   No excessive worry or anxiety.      Has good appetite - tries to eat healthy.      No change in personality, social disinhibition, change in dietary preferences, loss of empathy, stereotyped or repetitive behaviors.      He reports visual hallucinations, he sees people in the room, twice a week.   Possible fluctuating cognition,   No tremors, REM sleep behavior disorder,  falls.      Functional changes:   Born and raised in [place], [place]   Sleep: goes to bed at 8 pm wakes up at 6 am, takes a nap during the day  Current living situation - lives with his son, Javier in 2-story house .   Driving -he was getting lost, stopped driving in December 2023.   Finances - His son helps managing finances  Cooking -he occasionally cooks, left the stove on before   ADLS - independent in most of ADLs  Medications - Takes own medication; uses pillbox.   Weapons at home: no  Knows 911? yes     Neuropsychiatric symptoms:   Depression - denies depression. Appetite ok. Sleeping fine. No worthlessness/helplessness. No suicidal thoughts, intent or plans.   Anxiety - Denies.   Psychosis - Denies.   [place]ada - Denies.   Suicidality - Denies.      Prior Work-up:   Normal TSH, Vit B12.   Neuropsychological testing: not completed     Past Neuropsychiatric History:  Handedness: right.   History of traumatic brain injury: None.   History of seizures: " "None  History of stroke: yes, in 2006.   Past psychiatric history: None     PMH/PSH:  As above, in addition     Meds: reviewed as listed     Allergies: reviewed as listed     Family history:   Psychiatric: None.   Dementia: None   Parkinsons disease: None  Huntingtons disease: None  ALS: None     Social History:   No Tobacco use, stopped 15 years ago, occasional alcohol use, no recreational drugs  . Has 2 children   Worked in a factory, retired in 2021     Mental Status Examination:  General appearance: Well-groomed, good eye contact, cooperative  Orientation: Alert and oriented to person, place, and time  Motor: no psychomotor agitation or retardation, stable gait  Speech: regular rate, rhythm, tone, and volume  Mood: \"good\"  Affect: stable, full range, with brightening, and mood congruent  Passive death wish: denies  Suicidal ideation: denies  Thought process: logical, linear, and goal-directed without loosening of associations  Thought content: no hallucinations, delusions, and not responding to internal stimuli  Insight/Judgment: good/good  Praxis: Transitive/Intransitive/Orofacial  Fund of knowledge: good  Recent and remote memory: good  Attention span and concentration: good  Language: normal receptive and expressive language without paraphrasic errors     MoCA- [place] Cognitive Assessment ( 04/09/2024  ) : 12/30   Visuospatial / Executive: 1/5  Naming: 3/3  Read List of Digits: 1/2  Read List of Letters: 0/1  Serial Sevens: 2/3   Language Repeat: 1/2   Language Fluency: 1/1   Abstraction: 0/2   Delayed Recall: 0/5   Orientation: 2/6  Education:  12 years     \"f\"= [13], \"animals\"= [4]  Memory Index Score (MIS): 0/15  No agraphia or alexia  Completed 3-step Luria task  Negative applause sign  Head turning sign: yes      NEUROLOGICAL EXAMINATION     Opthalmoscopic:   Normal.  Fundi were well visualized with normal disc margins, clear vessels, and vascular pulsations, No disc edema.  The cup/disk ratio was " not enlarged.  No hemorrhages or exudates were present in the posterior segments that were visualized.     Cranial nerves:  CN II: visual fields full to confrontation  CN III, IV, VI: Pupils round, reactive to light and accommodation.  Lids symmetric.  No ptosis.  Extra-occular muscles are intact with normal alignment.  No nystagmus  CN V: Facial sensation intact bilaterally.    CN VII: Normal and symmetric facial strength.  Nasolabial folds symmetric  CN VIII: Hearing intact to finger rub  CN IX: Palate elevates symmetrically.  CN XI: Normal shoulder shrug and neck turning  CN XII: Tongue midline, with normal bulk and strength, no fasciculations       Motor:  Muscle bulk was normal in all extremities.  5/5 strength in all extremities  Mild left head turn and right head tilt  Normal finger taps and hand opening  [place] in BLE  Reduced right arm swing     Reflexes:   Right UE     LEFT UE  BR: 2          BR: 2  Biceps: 2    Biceps: 2  Triceps: 2   Triceps: 2     RIGHT LE     LEFT LE  Knee: 2        Knee: 2  Ankle: 2       Ankle: 2     Negative Elia's reflex  No frontal release signs     Coordination:  Normal finger to nose testing and rapid alternating movements     Gait:  Able to stand from seated position with arms across chest  Stable gait     Sensory:  Negative Romberg's sign     ROS: All systems reviewed, pertinent positives noted in HPI

## 2024-09-10 NOTE — PATIENT INSTRUCTIONS
You were seen today by Dr. Perkins.  It is a pleasure seeing you.    I'm glad that you have been stable from functional and cognitive standpoints.    PLAN:  Continue donepezil 5 mg daily    I recommended adequate control of vascular risk factors.  We discussed strategies to remain physically and mentally active.  Follow up in 6 months or earlier if needed.      Please call [phone] if you have any questions.

## 2024-09-16 ENCOUNTER — ANTICOAGULATION - WARFARIN VISIT (OUTPATIENT)
Dept: CARDIOLOGY | Facility: CLINIC | Age: 74
End: 2024-09-16
Payer: MEDICARE

## 2024-09-16 DIAGNOSIS — I48.92 ATRIAL FLUTTER, UNSPECIFIED TYPE (MULTI): ICD-10-CM

## 2024-09-16 DIAGNOSIS — Z79.01 LONG TERM (CURRENT) USE OF ANTICOAGULANTS: ICD-10-CM

## 2024-09-16 DIAGNOSIS — I48.91 ATRIAL FIBRILLATION, UNSPECIFIED TYPE (MULTI): Primary | ICD-10-CM

## 2024-09-16 LAB
POC INR: 1.4
POC PROTHROMBIN TIME: NORMAL

## 2024-09-16 PROCEDURE — 99211 OFF/OP EST MAY X REQ PHY/QHP: CPT

## 2024-09-16 PROCEDURE — 85610 PROTHROMBIN TIME: CPT | Mod: QW

## 2024-09-16 NOTE — PROGRESS NOTES
Patient identification verified with 2 identifiers.    Location: [facility] - [address] [address] [facility]. [place], [place] [address] [phone]     Referring Physician: Dr. Tang Pino  Enrollment/ Re-enrollment date: 3/25/2025   INR Goal: 2.0-3.0  INR monitoring is per [facility] protocol.  Anticoagulation Medication: warfarin  Indication: Atrial Fibrillation/Atrial Flutter    Subjective   Bleeding signs/symptoms: No    Bruising: No   Major bleeding event: No  Thrombosis signs/symptoms: No  Thromboembolic event: No  Missed doses: No  Extra doses: No  Medication changes: No  Dietary changes: No  Change in health: No  Change in activity: No  Alcohol: No  Other concerns: No    Upcoming Procedures:  Does the Patient Have any upcoming procedures that require interruption in anticoagulation therapy? no  Does the patient require bridging? no      Anticoagulation Summary  As of 2024      INR goal:  2.0-3.0   TTR:  59.3% (11.8 mo)   INR used for dosin.40 (2024)   Weekly warfarin total:  60 mg               Assessment/Plan     SUB THERAPEUTIC  INCREASE TWD  RTC IN 1 WEEK    Education provided to patient during the visit:  Patient instructed to call in interim with questions, concerns and changes.   Patient educated on interactions between medications and warfarin.   Patient educated on dietary consistency in vitamin k consumption.   Patient educated on affects of alcohol consumption while taking warfarin.   Patient educated on signs of bleeding/clotting.   Patient educated on compliance with dosing, follow up appointments, and prescribed plan of care.

## 2024-09-23 ENCOUNTER — ANTICOAGULATION - WARFARIN VISIT (OUTPATIENT)
Dept: CARDIOLOGY | Facility: CLINIC | Age: 74
End: 2024-09-23
Payer: MEDICARE

## 2024-09-23 DIAGNOSIS — I48.91 ATRIAL FIBRILLATION, UNSPECIFIED TYPE (MULTI): Primary | ICD-10-CM

## 2024-09-23 DIAGNOSIS — Z79.01 LONG TERM (CURRENT) USE OF ANTICOAGULANTS: ICD-10-CM

## 2024-09-23 DIAGNOSIS — I48.92 ATRIAL FLUTTER, UNSPECIFIED TYPE (MULTI): ICD-10-CM

## 2024-09-23 LAB
POC INR: 2.1
POC PROTHROMBIN TIME: NORMAL

## 2024-09-23 PROCEDURE — 85610 PROTHROMBIN TIME: CPT | Mod: QW

## 2024-09-23 PROCEDURE — 99211 OFF/OP EST MAY X REQ PHY/QHP: CPT

## 2024-09-23 NOTE — PROGRESS NOTES
Patient identification verified with 2 identifiers.    Location: [facility] - [address] [address] [facility]. [place], [place] [address] [phone]     Referring Physician: Dr. Tang Pino  Enrollment/ Re-enrollment date: 3/25/2025   INR Goal: 2.0-3.0  INR monitoring is per [facility] protocol.  Anticoagulation Medication: warfarin  Indication: Atrial Fibrillation/Atrial Flutter    Subjective   Bleeding signs/symptoms: No  Bruising: No   Major bleeding event: No  Thrombosis signs/symptoms: No  Thromboembolic event: No  Missed doses: No  Extra doses: No  Medication changes: No  Dietary changes: No  Change in health: No  Change in activity: No  Alcohol: No  Other concerns: No    Upcoming Procedures:  Does the Patient Have any upcoming procedures that require interruption in anticoagulation therapy? no  Does the patient require bridging? no      Anticoagulation Summary  As of 2024      INR goal:  2.0-3.0   TTR:  58.5% (1 y)   INR used for dosin.10 (2024)   Weekly warfarin total:  60 mg               Assessment/Plan     Therapeutic  No change. Dose maintained.   Pt to RTC in 1 week.    Education provided to patient during the visit:  Patient instructed to call in interim with questions, concerns and changes.   Patient educated on interactions between medications and warfarin.   Patient educated on dietary consistency in vitamin k consumption.   Patient educated on affects of alcohol consumption while taking warfarin.   Patient educated on signs of bleeding/clotting.   Patient educated on compliance with dosing, follow up appointments, and prescribed plan of care.

## 2024-09-26 DIAGNOSIS — E55.9 VITAMIN D DEFICIENCY: ICD-10-CM

## 2024-09-26 RX ORDER — ERGOCALCIFEROL 1.25 1/1
1 CAPSULE ORAL
Qty: 12 CAPSULE | Refills: 3 | Status: SHIPPED | OUTPATIENT
Start: 2024-09-29

## 2024-09-30 ENCOUNTER — ANTICOAGULATION - WARFARIN VISIT (OUTPATIENT)
Dept: CARDIOLOGY | Facility: CLINIC | Age: 74
End: 2024-09-30
Payer: MEDICARE

## 2024-09-30 DIAGNOSIS — Z79.01 LONG TERM (CURRENT) USE OF ANTICOAGULANTS: ICD-10-CM

## 2024-09-30 DIAGNOSIS — I48.92 ATRIAL FLUTTER, UNSPECIFIED TYPE (MULTI): ICD-10-CM

## 2024-09-30 DIAGNOSIS — I48.91 ATRIAL FIBRILLATION, UNSPECIFIED TYPE (MULTI): Primary | ICD-10-CM

## 2024-09-30 LAB
POC INR: 2.4
POC PROTHROMBIN TIME: NORMAL

## 2024-09-30 PROCEDURE — 85610 PROTHROMBIN TIME: CPT | Mod: QW

## 2024-09-30 PROCEDURE — 99211 OFF/OP EST MAY X REQ PHY/QHP: CPT

## 2024-09-30 NOTE — PROGRESS NOTES
Patient identification verified with 2 identifiers.    Location: [facility] - [address] [address] [place], [place] [address] [phone]     Referring Physician: Dr. Tang Pino  Enrollment/ Re-enrollment date: 3/25/2025   INR Goal: 2.0-3.0  INR monitoring is per [facility] protocol.  Anticoagulation Medication: warfarin  Indication: Atrial Fibrillation/Atrial Flutter  Subjective   Bleeding signs/symptoms: No    Bruising: No   Major bleeding event: No  Thrombosis signs/symptoms: No  Thromboembolic event: No  Missed doses: No  Extra doses: No  Medication changes: No  Dietary changes: No  Change in health: No  Change in activity: No  Alcohol: No  Other concerns: No    Upcoming Procedures:  Does the Patient Have any upcoming procedures that require interruption in anticoagulation therapy? no  Does the patient require bridging? no      Anticoagulation Summary  As of 2024      INR goal:  2.0-3.0   TTR:  59.3% (1 y)   INR used for dosin.40 (2024)   Weekly warfarin total:  60 mg               Assessment/Plan   Therapeutic     1. New dose: no change    2. Next INR: 2 weeks      Education provided to patient during the visit:  Patient instructed to call in interim with questions, concerns and changes.   Patient educated on compliance with dosing, follow up appointments, and prescribed plan of care.

## 2024-10-09 ENCOUNTER — HOSPITAL ENCOUNTER (OUTPATIENT)
Dept: RADIOLOGY | Facility: HOSPITAL | Age: 74
Discharge: HOME | End: 2024-10-09
Payer: MEDICARE

## 2024-10-09 DIAGNOSIS — I10 ESSENTIAL (PRIMARY) HYPERTENSION: ICD-10-CM

## 2024-10-09 PROCEDURE — 93975 VASCULAR STUDY: CPT

## 2024-10-14 ENCOUNTER — ANTICOAGULATION - WARFARIN VISIT (OUTPATIENT)
Dept: CARDIOLOGY | Facility: CLINIC | Age: 74
End: 2024-10-14
Payer: MEDICARE

## 2024-10-14 DIAGNOSIS — I48.92 ATRIAL FLUTTER, UNSPECIFIED TYPE (MULTI): ICD-10-CM

## 2024-10-14 DIAGNOSIS — I48.91 ATRIAL FIBRILLATION, UNSPECIFIED TYPE (MULTI): Primary | ICD-10-CM

## 2024-10-14 DIAGNOSIS — Z79.01 LONG TERM (CURRENT) USE OF ANTICOAGULANTS: ICD-10-CM

## 2024-10-14 LAB
POC INR: 3.2
POC PROTHROMBIN TIME: NORMAL

## 2024-10-14 PROCEDURE — 85610 PROTHROMBIN TIME: CPT | Mod: QW

## 2024-10-14 PROCEDURE — 99211 OFF/OP EST MAY X REQ PHY/QHP: CPT

## 2024-10-14 NOTE — PROGRESS NOTES
Patient identification verified with 2 identifiers.    Location: [facility] - [address] [address] [facility]. [place], [place] [address] [phone]     Referring Physician: Dr. Tang Pino  Enrollment/ Re-enrollment date: 3/25/2025   INR Goal: 2.0-3.0  INR monitoring is per [facility] protocol.  Anticoagulation Medication: warfarin  Indication: Atrial Fibrillation/Atrial Flutter    Subjective   Bleeding signs/symptoms: No    Bruising: No   Major bleeding event: No  Thrombosis signs/symptoms: No  Thromboembolic event: No  Missed doses: No  Extra doses: No  Medication changes: No  Dietary changes: Yes  INR HIGH POSSIBLY DUE TO DIET THE PAST WEEK  Change in health: No  Change in activity: No  Alcohol: No  Other concerns: No    Upcoming Procedures:  Does the Patient Have any upcoming procedures that require interruption in anticoagulation therapy? no  Does the patient require bridging? no      Anticoagulation Summary  As of 10/14/2024      INR goal:  2.0-3.0   TTR:  59.8% (1 y)   INR used for dosing:  3.20 (10/14/2024)   Weekly warfarin total:  60 mg               Assessment/Plan   Supratherapeutic     1. New dose: no change    2. Next INR: 1 week      Education provided to patient during the visit:  Patient instructed to call in interim with questions, concerns and changes.   Patient educated on interactions between medications and warfarin.   Patient educated on dietary consistency in vitamin k consumption.   Patient educated on compliance with dosing, follow up appointments, and prescribed plan of care.

## 2024-10-19 DIAGNOSIS — E11.69 TYPE 2 DIABETES MELLITUS WITH OTHER SPECIFIED COMPLICATION, UNSPECIFIED WHETHER LONG TERM INSULIN USE (MULTI): ICD-10-CM

## 2024-10-21 ENCOUNTER — ANTICOAGULATION - WARFARIN VISIT (OUTPATIENT)
Dept: CARDIOLOGY | Facility: CLINIC | Age: 74
End: 2024-10-21
Payer: MEDICARE

## 2024-10-21 DIAGNOSIS — Z79.01 LONG TERM (CURRENT) USE OF ANTICOAGULANTS: ICD-10-CM

## 2024-10-21 DIAGNOSIS — I48.92 ATRIAL FLUTTER, UNSPECIFIED TYPE (MULTI): ICD-10-CM

## 2024-10-21 DIAGNOSIS — I48.91 ATRIAL FIBRILLATION, UNSPECIFIED TYPE (MULTI): Primary | ICD-10-CM

## 2024-10-21 LAB
POC INR: 3.1
POC PROTHROMBIN TIME: NORMAL

## 2024-10-21 PROCEDURE — 85610 PROTHROMBIN TIME: CPT | Mod: QW

## 2024-10-21 RX ORDER — BLOOD SUGAR DIAGNOSTIC
STRIP MISCELLANEOUS
Qty: 200 STRIP | Refills: 3 | Status: SHIPPED | OUTPATIENT
Start: 2024-10-21

## 2024-10-21 NOTE — PROGRESS NOTES
Patient identification verified with 2 identifiers.    Location: [facility] - [address] [address] [place], [place] [address] [phone]     Referring Physician: Dr. Tang Pino  Enrollment/ Re-enrollment date: 3/25/2025   INR Goal: 2.0-3.0  INR monitoring is per [facility] protocol.  Anticoagulation Medication: warfarin  Indication: Atrial Fibrillation/Atrial Flutter    Subjective   Bleeding signs/symptoms: No    Bruising: No   Major bleeding event: No  Thrombosis signs/symptoms: No  Thromboembolic event: No  Missed doses: No  Extra doses: No  Medication changes: No  Dietary changes: No  Change in health: No  Change in activity: No  Alcohol: No  Other concerns: No    Upcoming Procedures:  Does the Patient Have any upcoming procedures that require interruption in anticoagulation therapy? no  Does the patient require bridging? no      Anticoagulation Summary  As of 10/21/2024      INR goal:  2.0-3.0   TTR:  58.7% (1.1 y)   INR used for dosing:  3.10 (10/21/2024)   Weekly warfarin total:  55 mg               Assessment/Plan   Supratherapeutic     1. New dose: decrease per protocol   2. Next INR: 1 week      Education provided to patient during the visit:  Patient instructed to call in interim with questions, concerns and changes.   Patient educated on interactions between medications and warfarin.   Patient educated on compliance with dosing, follow up appointments, and prescribed plan of care.

## 2024-10-25 DIAGNOSIS — N18.32 CHRONIC KIDNEY DISEASE, STAGE 3B (MULTI): Primary | ICD-10-CM

## 2024-10-25 DIAGNOSIS — I10 ESSENTIAL (PRIMARY) HYPERTENSION: ICD-10-CM

## 2024-10-28 ENCOUNTER — ANTICOAGULATION - WARFARIN VISIT (OUTPATIENT)
Dept: CARDIOLOGY | Facility: CLINIC | Age: 74
End: 2024-10-28
Payer: MEDICARE

## 2024-10-28 DIAGNOSIS — Z79.01 LONG TERM (CURRENT) USE OF ANTICOAGULANTS: ICD-10-CM

## 2024-10-28 DIAGNOSIS — I48.91 ATRIAL FIBRILLATION, UNSPECIFIED TYPE (MULTI): Primary | ICD-10-CM

## 2024-10-28 DIAGNOSIS — I48.92 ATRIAL FLUTTER, UNSPECIFIED TYPE (MULTI): ICD-10-CM

## 2024-10-28 LAB
POC INR: 3.8 (ref 2–3)
POC PROTHROMBIN TIME: ABNORMAL

## 2024-10-28 PROCEDURE — 99211 OFF/OP EST MAY X REQ PHY/QHP: CPT

## 2024-10-28 PROCEDURE — 85610 PROTHROMBIN TIME: CPT | Mod: QW

## 2024-10-29 ENCOUNTER — APPOINTMENT (OUTPATIENT)
Dept: PRIMARY CARE | Facility: CLINIC | Age: 74
End: 2024-10-29
Payer: MEDICARE

## 2024-10-29 ENCOUNTER — LAB (OUTPATIENT)
Dept: LAB | Facility: LAB | Age: 74
End: 2024-10-29
Payer: MEDICARE

## 2024-10-29 VITALS
DIASTOLIC BLOOD PRESSURE: 76 MMHG | HEART RATE: 72 BPM | WEIGHT: 262 LBS | SYSTOLIC BLOOD PRESSURE: 114 MMHG | BODY MASS INDEX: 39.26 KG/M2

## 2024-10-29 DIAGNOSIS — E11.69 TYPE 2 DIABETES MELLITUS WITH OTHER SPECIFIED COMPLICATION, WITHOUT LONG-TERM CURRENT USE OF INSULIN: ICD-10-CM

## 2024-10-29 DIAGNOSIS — I63.9 CEREBROVASCULAR ACCIDENT (CVA), UNSPECIFIED MECHANISM (MULTI): ICD-10-CM

## 2024-10-29 DIAGNOSIS — N18.32 CHRONIC KIDNEY DISEASE, STAGE 3B (MULTI): ICD-10-CM

## 2024-10-29 DIAGNOSIS — I10 BENIGN ESSENTIAL HYPERTENSION: ICD-10-CM

## 2024-10-29 DIAGNOSIS — M05.9 RHEUMATOID ARTHRITIS WITH RHEUMATOID FACTOR, UNSPECIFIED: ICD-10-CM

## 2024-10-29 DIAGNOSIS — E66.01 CLASS 2 SEVERE OBESITY DUE TO EXCESS CALORIES WITH SERIOUS COMORBIDITY AND BODY MASS INDEX (BMI) OF 39.0 TO 39.9 IN ADULT: ICD-10-CM

## 2024-10-29 DIAGNOSIS — Z23 NEED FOR INFLUENZA VACCINATION: ICD-10-CM

## 2024-10-29 DIAGNOSIS — I48.91 ATRIAL FIBRILLATION, UNSPECIFIED TYPE (MULTI): ICD-10-CM

## 2024-10-29 DIAGNOSIS — Z23 NEED FOR PNEUMOCOCCAL 20-VALENT CONJUGATE VACCINATION: ICD-10-CM

## 2024-10-29 DIAGNOSIS — E78.2 MIXED HYPERLIPIDEMIA: ICD-10-CM

## 2024-10-29 DIAGNOSIS — D68.32 HEMORRHAGIC DISORDER DUE TO EXTRINSIC CIRCULATING ANTICOAGULANTS (MULTI): Primary | ICD-10-CM

## 2024-10-29 DIAGNOSIS — N18.31 CHRONIC KIDNEY DISEASE, STAGE 3A (MULTI): ICD-10-CM

## 2024-10-29 DIAGNOSIS — N18.32 STAGE 3B CHRONIC KIDNEY DISEASE (MULTI): ICD-10-CM

## 2024-10-29 DIAGNOSIS — C61 PROSTATE CANCER (MULTI): ICD-10-CM

## 2024-10-29 DIAGNOSIS — E66.812 CLASS 2 SEVERE OBESITY DUE TO EXCESS CALORIES WITH SERIOUS COMORBIDITY AND BODY MASS INDEX (BMI) OF 39.0 TO 39.9 IN ADULT: ICD-10-CM

## 2024-10-29 DIAGNOSIS — I10 ESSENTIAL (PRIMARY) HYPERTENSION: ICD-10-CM

## 2024-10-29 LAB
25(OH)D3 SERPL-MCNC: 54 NG/ML (ref 30–100)
ALBUMIN SERPL BCP-MCNC: 4.4 G/DL (ref 3.4–5)
ANION GAP SERPL CALC-SCNC: 15 MMOL/L (ref 10–20)
APPEARANCE UR: CLEAR
BILIRUB UR STRIP.AUTO-MCNC: NEGATIVE MG/DL
BUN SERPL-MCNC: 31 MG/DL (ref 6–23)
CALCIUM SERPL-MCNC: 9.9 MG/DL (ref 8.6–10.6)
CHLORIDE SERPL-SCNC: 106 MMOL/L (ref 98–107)
CO2 SERPL-SCNC: 20 MMOL/L (ref 21–32)
COLOR UR: ABNORMAL
CREAT SERPL-MCNC: 2.09 MG/DL (ref 0.5–1.3)
CREAT UR-MCNC: 126.5 MG/DL (ref 20–370)
EGFRCR SERPLBLD CKD-EPI 2021: 33 ML/MIN/1.73M*2
ERYTHROCYTE [DISTWIDTH] IN BLOOD BY AUTOMATED COUNT: 17.8 % (ref 11.5–14.5)
GLUCOSE SERPL-MCNC: 150 MG/DL (ref 74–99)
GLUCOSE UR STRIP.AUTO-MCNC: ABNORMAL MG/DL
HCT VFR BLD AUTO: 49.2 % (ref 41–52)
HGB BLD-MCNC: 16.5 G/DL (ref 13.5–17.5)
KETONES UR STRIP.AUTO-MCNC: NEGATIVE MG/DL
LEUKOCYTE ESTERASE UR QL STRIP.AUTO: NEGATIVE
MCH RBC QN AUTO: 25.6 PG (ref 26–34)
MCHC RBC AUTO-ENTMCNC: 33.5 G/DL (ref 32–36)
MCV RBC AUTO: 76 FL (ref 80–100)
MICROALBUMIN UR-MCNC: 7.2 MG/L
MICROALBUMIN/CREAT UR: 5.7 UG/MG CREAT
NITRITE UR QL STRIP.AUTO: NEGATIVE
NRBC BLD-RTO: 0 /100 WBCS (ref 0–0)
PH UR STRIP.AUTO: 5 [PH]
PHOSPHATE SERPL-MCNC: 3.9 MG/DL (ref 2.5–4.9)
PLATELET # BLD AUTO: 198 X10*3/UL (ref 150–450)
POC FINGERSTICK BLOOD GLUCOSE: 125 MG/DL (ref 70–100)
POC HEMOGLOBIN A1C: 7.8 % (ref 4.2–6.5)
POTASSIUM SERPL-SCNC: 4.6 MMOL/L (ref 3.5–5.3)
PROT UR STRIP.AUTO-MCNC: NEGATIVE MG/DL
PTH-INTACT SERPL-MCNC: 51.5 PG/ML (ref 18.5–88)
RBC # BLD AUTO: 6.45 X10*6/UL (ref 4.5–5.9)
RBC # UR STRIP.AUTO: NEGATIVE /UL
SODIUM SERPL-SCNC: 136 MMOL/L (ref 136–145)
SP GR UR STRIP.AUTO: 1.01
URATE SERPL-MCNC: 6.5 MG/DL (ref 4–7.5)
UROBILINOGEN UR STRIP.AUTO-MCNC: NORMAL MG/DL
WBC # BLD AUTO: 6.2 X10*3/UL (ref 4.4–11.3)

## 2024-10-29 PROCEDURE — 83036 HEMOGLOBIN GLYCOSYLATED A1C: CPT | Performed by: INTERNAL MEDICINE

## 2024-10-29 PROCEDURE — 90662 IIV NO PRSV INCREASED AG IM: CPT | Performed by: INTERNAL MEDICINE

## 2024-10-29 PROCEDURE — 85027 COMPLETE CBC AUTOMATED: CPT

## 2024-10-29 PROCEDURE — 3048F LDL-C <100 MG/DL: CPT | Performed by: INTERNAL MEDICINE

## 2024-10-29 PROCEDURE — 3061F NEG MICROALBUMINURIA REV: CPT | Performed by: INTERNAL MEDICINE

## 2024-10-29 PROCEDURE — 90677 PCV20 VACCINE IM: CPT | Performed by: INTERNAL MEDICINE

## 2024-10-29 PROCEDURE — 82043 UR ALBUMIN QUANTITATIVE: CPT

## 2024-10-29 PROCEDURE — 4010F ACE/ARB THERAPY RXD/TAKEN: CPT | Performed by: INTERNAL MEDICINE

## 2024-10-29 PROCEDURE — 82088 ASSAY OF ALDOSTERONE: CPT

## 2024-10-29 PROCEDURE — G2211 COMPLEX E/M VISIT ADD ON: HCPCS | Performed by: INTERNAL MEDICINE

## 2024-10-29 PROCEDURE — 82962 GLUCOSE BLOOD TEST: CPT | Performed by: INTERNAL MEDICINE

## 2024-10-29 PROCEDURE — 3044F HG A1C LEVEL LT 7.0%: CPT | Performed by: INTERNAL MEDICINE

## 2024-10-29 PROCEDURE — G0008 ADMIN INFLUENZA VIRUS VAC: HCPCS | Performed by: INTERNAL MEDICINE

## 2024-10-29 PROCEDURE — 3078F DIAST BP <80 MM HG: CPT | Performed by: INTERNAL MEDICINE

## 2024-10-29 PROCEDURE — G0009 ADMIN PNEUMOCOCCAL VACCINE: HCPCS | Performed by: INTERNAL MEDICINE

## 2024-10-29 PROCEDURE — 1126F AMNT PAIN NOTED NONE PRSNT: CPT | Performed by: INTERNAL MEDICINE

## 2024-10-29 PROCEDURE — 82306 VITAMIN D 25 HYDROXY: CPT

## 2024-10-29 PROCEDURE — 83970 ASSAY OF PARATHORMONE: CPT

## 2024-10-29 PROCEDURE — 84550 ASSAY OF BLOOD/URIC ACID: CPT

## 2024-10-29 PROCEDURE — 36415 COLL VENOUS BLD VENIPUNCTURE: CPT

## 2024-10-29 PROCEDURE — 1159F MED LIST DOCD IN RCRD: CPT | Performed by: INTERNAL MEDICINE

## 2024-10-29 PROCEDURE — 82570 ASSAY OF URINE CREATININE: CPT

## 2024-10-29 PROCEDURE — 3074F SYST BP LT 130 MM HG: CPT | Performed by: INTERNAL MEDICINE

## 2024-10-29 PROCEDURE — 99214 OFFICE O/P EST MOD 30 MIN: CPT | Performed by: INTERNAL MEDICINE

## 2024-10-29 PROCEDURE — 80069 RENAL FUNCTION PANEL: CPT

## 2024-10-29 PROCEDURE — 1036F TOBACCO NON-USER: CPT | Performed by: INTERNAL MEDICINE

## 2024-10-29 PROCEDURE — 1160F RVW MEDS BY RX/DR IN RCRD: CPT | Performed by: INTERNAL MEDICINE

## 2024-10-29 PROCEDURE — 81003 URINALYSIS AUTO W/O SCOPE: CPT

## 2024-10-29 RX ORDER — LISINOPRIL 40 MG/1
40 TABLET ORAL DAILY
COMMUNITY

## 2024-10-29 ASSESSMENT — PAIN SCALES - GENERAL: PAINLEVEL_OUTOF10: 0-NO PAIN

## 2024-10-29 ASSESSMENT — ENCOUNTER SYMPTOMS
SEIZURES: 0
ADENOPATHY: 0
WHEEZING: 0
NECK STIFFNESS: 0
MYALGIAS: 0
RHINORRHEA: 0
EYE DISCHARGE: 0
RECTAL PAIN: 0
WEAKNESS: 0
FACIAL ASYMMETRY: 0
CHOKING: 0
APPETITE CHANGE: 0
HEMATURIA: 0
VOICE CHANGE: 0
FACIAL SWELLING: 0
DIARRHEA: 0
CHEST TIGHTNESS: 0
COLOR CHANGE: 0
DIAPHORESIS: 0
DYSURIA: 0
LIGHT-HEADEDNESS: 0
PALPITATIONS: 0
JOINT SWELLING: 0
WOUND: 0
COUGH: 0
ANAL BLEEDING: 0
NUMBNESS: 0
BLOOD IN STOOL: 0
EYE ITCHING: 0
SPEECH DIFFICULTY: 0
CONSTIPATION: 0
TROUBLE SWALLOWING: 0
HEADACHES: 0
DIZZINESS: 0
PHOTOPHOBIA: 0
SLEEP DISTURBANCE: 0
SINUS PRESSURE: 0
SORE THROAT: 0
CHILLS: 0
NECK PAIN: 0
ACTIVITY CHANGE: 0
BACK PAIN: 0
EYE PAIN: 0
NAUSEA: 0
ABDOMINAL PAIN: 0
BRUISES/BLEEDS EASILY: 0
EYE REDNESS: 0
SHORTNESS OF BREATH: 0
VOMITING: 0
POLYPHAGIA: 0
POLYDIPSIA: 0
TREMORS: 0
SINUS PAIN: 0
FATIGUE: 0
ABDOMINAL DISTENTION: 0
STRIDOR: 0
DIFFICULTY URINATING: 0
FREQUENCY: 0
ARTHRALGIAS: 0
FLANK PAIN: 0

## 2024-11-03 LAB
ALDOST/RENIN PLAS-RTO: 0.3 RATIO
ALDOSTERONE IN SERUM: 27.1 NG/DL
RENIN PLAS-CCNC: 89 NG/ML/HR

## 2024-11-04 ENCOUNTER — ANTICOAGULATION - WARFARIN VISIT (OUTPATIENT)
Dept: CARDIOLOGY | Facility: CLINIC | Age: 74
End: 2024-11-04
Payer: MEDICARE

## 2024-11-04 DIAGNOSIS — I48.91 ATRIAL FIBRILLATION, UNSPECIFIED TYPE (MULTI): Primary | ICD-10-CM

## 2024-11-04 DIAGNOSIS — I48.92 ATRIAL FLUTTER, UNSPECIFIED TYPE (MULTI): ICD-10-CM

## 2024-11-04 DIAGNOSIS — Z79.01 LONG TERM (CURRENT) USE OF ANTICOAGULANTS: ICD-10-CM

## 2024-11-04 LAB
POC INR: 2.6
POC PROTHROMBIN TIME: NORMAL

## 2024-11-04 PROCEDURE — 85610 PROTHROMBIN TIME: CPT | Mod: QW

## 2024-11-04 PROCEDURE — 99211 OFF/OP EST MAY X REQ PHY/QHP: CPT

## 2024-11-04 NOTE — PROGRESS NOTES
Patient identification verified with 2 identifiers.    Location: [facility] - [address] [address] [place], [place] [address] [phone]     Referring Physician: Dr. Tang Pino  Enrollment/ Re-enrollment date: 3/25/2025   INR Goal: 2.0-3.0  INR monitoring is per [facility] protocol.  Anticoagulation Medication: warfarin  Indication: Atrial Fibrillation/Atrial Flutter  Subjective   Bleeding signs/symptoms: No    Bruising: No   Major bleeding event: No  Thrombosis signs/symptoms: No  Thromboembolic event: No  Missed doses: No  Extra doses: No  Medication changes: No  Dietary changes: No  Change in health: No  Change in activity: No  Alcohol: No  Other concerns: No    Upcoming Procedures:  Does the Patient Have any upcoming procedures that require interruption in anticoagulation therapy? no  Does the patient require bridging? no      Anticoagulation Summary  As of 2024      INR goal:  2.0-3.0   TTR:  57.3% (1.1 y)   INR used for dosin.60 (2024)   Weekly warfarin total:  50 mg               Assessment/Plan   Therapeutic     1. New dose: no change    2. Next INR: 1 week      Education provided to patient during the visit:  Patient instructed to call in interim with questions, concerns and changes.   Patient educated on compliance with dosing, follow up appointments, and prescribed plan of care.

## 2024-11-07 DIAGNOSIS — N18.32 CHRONIC KIDNEY DISEASE, STAGE 3B (MULTI): Primary | ICD-10-CM

## 2024-11-11 ENCOUNTER — ANTICOAGULATION - WARFARIN VISIT (OUTPATIENT)
Dept: CARDIOLOGY | Facility: CLINIC | Age: 74
End: 2024-11-11
Payer: MEDICARE

## 2024-11-11 DIAGNOSIS — I48.92 ATRIAL FLUTTER, UNSPECIFIED TYPE (MULTI): ICD-10-CM

## 2024-11-11 DIAGNOSIS — Z79.01 LONG TERM (CURRENT) USE OF ANTICOAGULANTS: ICD-10-CM

## 2024-11-11 DIAGNOSIS — I48.91 ATRIAL FIBRILLATION, UNSPECIFIED TYPE (MULTI): Primary | ICD-10-CM

## 2024-11-11 LAB
POC INR: 2.8 (ref 2–3)
POC PROTHROMBIN TIME: NORMAL

## 2024-11-11 PROCEDURE — 99211 OFF/OP EST MAY X REQ PHY/QHP: CPT

## 2024-11-11 PROCEDURE — 85610 PROTHROMBIN TIME: CPT | Mod: QW

## 2024-11-11 NOTE — PROGRESS NOTES
Patient identification verified with 2 identifiers.    Location: [facility] - [address] [address] [facility]. [place], [place] [address] [phone]     Referring Physician: Dr. Tang Pino  Enrollment/ Re-enrollment date: 3/25/2025   INR Goal: 2.0-3.0  INR monitoring is per [facility] protocol.  Anticoagulation Medication: warfarin  Indication: Atrial Fibrillation/Atrial Flutter  Subjective   Bleeding signs/symptoms: No    Bruising: No   Major bleeding event: No  Thrombosis signs/symptoms: No  Thromboembolic event: No  Missed doses: No  Extra doses: No  Medication changes: No  Dietary changes: No  Change in health: No  Change in activity: No  Alcohol: No  Other concerns: No    Upcoming Procedures:  Does the Patient Have any upcoming procedures that require interruption in anticoagulation therapy? no  Does the patient require bridging? no      Anticoagulation Summary  As of 2024      INR goal:  2.0-3.0   TTR:  58.0% (1.1 y)   INR used for dosin.80 (2024)   Weekly warfarin total:  50 mg               Assessment/Plan   Therapeutic     1. New dose: no change    2. Next INR: 2 weeks      Education provided to patient during the visit:  Patient instructed to call in interim with questions, concerns and changes.   Patient educated on interactions between medications and warfarin.   Patient educated on dietary consistency in vitamin k consumption.   Patient educated on affects of alcohol consumption while taking warfarin.   Patient educated on signs of bleeding/clotting.   Patient educated on compliance with dosing, follow up appointments, and prescribed plan of care.

## 2024-11-12 ENCOUNTER — APPOINTMENT (OUTPATIENT)
Dept: PHARMACY | Facility: HOSPITAL | Age: 74
End: 2024-11-12
Payer: MEDICARE

## 2024-11-12 DIAGNOSIS — E11.69 TYPE 2 DIABETES MELLITUS WITH OTHER SPECIFIED COMPLICATION, WITHOUT LONG-TERM CURRENT USE OF INSULIN: ICD-10-CM

## 2024-11-12 RX ORDER — DAPAGLIFLOZIN 10 MG/1
10 TABLET, FILM COATED ORAL DAILY
Qty: 30 TABLET | Refills: 2 | Status: SHIPPED | OUTPATIENT
Start: 2024-11-12

## 2024-11-12 RX ORDER — SODIUM BICARBONATE 325 MG/1
325 TABLET ORAL 2 TIMES DAILY
COMMUNITY

## 2024-11-12 RX ORDER — SENNOSIDES 8.6 MG/1
2 TABLET ORAL DAILY
COMMUNITY

## 2024-11-12 NOTE — PROGRESS NOTES
I reviewed the progress note and agree with the resident’s findings and plans as written. Case discussed with resident.    Luanne Florian, PharmD  Clinical Pharmacy Specialist   [phone]

## 2024-11-12 NOTE — PROGRESS NOTES
Patient is sent at the request of Manjinder Francis MD for my opinion regarding Type 2 diabetes.  My final recommendations will be communicated back to the requesting provider by way of shared medical record.    Subjective     Patient is a 74 year old male presenting for diabetes management. He has memory loss and his son, Javier manages his medications. Patent's most recent A1c is 7.8% and he is currently on Farxiga 10 mg daily with an interest in applying for [facility] for cost assistance.       Past Medical History:  He has no past medical history on file.    Past Surgical History:  He has a past surgical history that includes Colonoscopy (12/05/2013); Hernia repair (12/06/2013); Colonoscopy (11/07/2014); Ablation of dysrhythmic focus (05/22/2017); and Colonoscopy (01/2021).    Social History:  He reports that he has quit smoking. His smoking use included cigarettes. He has a 15 pack-year smoking history. He has never used smokeless tobacco. He reports current alcohol use of about 5.0 standard drinks of alcohol per week. He reports that he does not use drugs.    Family History:  Family History   Problem Relation Name Age of Onset    Cancer Mother      Sleep apnea Brother         Allergies:  Iodinated contrast media  DIABETES MELLITUS TYPE 2:    Does patient follow with Endocrinology: No  Last optometry exam: Annually. Next one is February   Most recent visit in Podiatry: every 2 months, next appt in January    Current diabetic medications include:  Farxiga 10 mg daily     Past diabetic medications include:  Insulin - long acting. Took about 3 years ago.     Glucose Readings:  Glucometer/CGM Type: One touch ultra meter   Patient does not test blood sugar regularly.     Any episodes of hypoglycemia? N/A.  Did patient treat episode of hypoglycemia appropriately? N/A  Does the patient have a prescription for ready-to-use Glucagon? Not on insulin    Lifestyle:  Diet: Son tries to cook at home 3 meals/day. About 3 times a  "month they go out to eat. Tries to limit carbs.   BK: eggs, cereal   LN: hotdog  Snacks: does not snack a lot   Drinks: Diet pepsi and a lot of water and chocolate milk.   Physical Activity: Walks around stores 3 times a week 1-2 hours. Gets winded walking a lot.     Secondary Prevention:  Statin? Yes, Atorvastatin 80 mg   ACE-I/ARB? Yes, Lisinopril 40 mg   Aspirin? No    Pertinent PMH Review:  PMH of Pancreatitis: No  PMH of Retinopathy: No  PMH of Urinary Tract Infections: No  PMH of MTC: No    Drug Interactions:  none    Objective     Last Recorded Vitals:  BP Readings from Last 6 Encounters:   10/29/24 114/76   09/10/24 145/88   07/30/24 140/86   04/09/24 105/67   01/25/24 136/80   11/15/23 134/80        Wt Readings from Last 6 Encounters:   10/29/24 119 kg (262 lb)   09/10/24 104 kg (230 lb 6.1 oz)   07/30/24 116 kg (255 lb)   04/30/24 106 kg (233 lb 8 oz)   01/25/24 108 kg (238 lb 6.4 oz)   11/15/23 107 kg (236 lb 6.4 oz)       BMI Readings from Last 6 Encounters:   10/29/24 39.26 kg/m²   09/10/24 34.52 kg/m²   07/30/24 38.77 kg/m²   04/30/24 35.50 kg/m²   04/09/24 36.25 kg/m²   01/25/24 36.25 kg/m²       Lab Review  Lab Results   Component Value Date    BILITOT 0.7 07/30/2024    CALCIUM 9.9 10/29/2024    CO2 20 (L) 10/29/2024     10/29/2024    CREATININE 2.09 (H) 10/29/2024    GLUCOSE 150 (H) 10/29/2024    ALKPHOS 80 07/30/2024    K 4.6 10/29/2024    PROT 7.2 07/30/2024     10/29/2024    AST 17 07/30/2024    ALT 19 07/30/2024    BUN 31 (H) 10/29/2024    ANIONGAP 15 10/29/2024    MG 2.02 01/09/2021    PHOS 3.9 10/29/2024    ALBUMIN 4.4 10/29/2024    GFRMALE 36 (A) 08/02/2023     Lab Results   Component Value Date    TRIG 89 07/30/2024    CHOL 134 07/30/2024    LDLCALC 69 07/30/2024    HDL 47.1 07/30/2024     Lab Results   Component Value Date    HGBA1C 7.8 (A) 10/29/2024    HGBA1C 7.1 (A) 07/30/2024    HGBA1C 6.6 (A) 04/30/2024     No components found for: \"UACR\"  The ASCVD Risk score (Bonilla DEGROOT, " et al., 2019) failed to calculate for the following reasons:    Risk score cannot be calculated because patient has a medical history suggesting prior/existing ASCVD     Patient Assistance Program (PAP)    Application for program to be submitted for the following medications: Farxiga 10 mg     County of Permanent Address: [place]    Prescription Insurance:   Yes   Members of Household: 1   Files Taxes: No       Patient will be email financial information to pharmacist directly at [email or web address].    Patient verbally reports monthly or yearly income which is less than 400% federal poverty level    Patient aware this process may take up to 6 weeks.     If approved medication must be filled through [facility] and MEDICATION WILL BE MAILED TO PATIENT.    PATIENT EDUCATION/DISCUSSION:  - Counseled patient on Farxiga MOA, expectations, side effects, duration of therapy, contraindications, administration, and monitoring parameters  - Answered all patient questions and concerns      Assessment/Plan   Problem List Items Addressed This Visit       Type 2 diabetes mellitus with other specified complication, without long-term current use of insulin     Assessment:   - Patient's son, Javier manages his medications. I spoke to him today regarding Elver's diabetes management. His current A1c is 7.8%. We discussed trying to aim for a goal A1c of <7%.  - Went over diet modifications to reduce patient's blood sugar including monitoring carbohydrate intake to ~45-60 grams per meal.   - Patient is currently on Farxiga for his diabetes with a monthly co-pay of $146. He does not file taxes and collects social security. His son will be emailing patient's [phone] form to initiate application process for PAP.     Plan:    CONTINUE Farxiga 10 mg once daily   PAP pending  Script sent to [facility] pharmacy.   CONTINUE making positive diet/lifestyle modifications.  Follow up with clinical pharmacy in 1 week  Provided clinical  pharmacist's number for any questions prior to follow up date ([phone]).         Relevant Orders    Referral to Clinical Pharmacy       Continue all meds under the continuation of care with the referring provider and clinical pharmacy team.     Follow up with Clinical Pharmacy Team 11/19/24 at 11 am     Time spent with pt: Total length of time 30 (minutes) of the encounter and more than 50% was spent counseling the patient.    Continue all meds under the continuation of care with the referring provider and clinical pharmacy team.    Please reach out to the Clinical Pharmacy Team if there are any further questions.     Verbal consent to manage patient's drug therapy was obtained from Javier (son). They were informed they may decline to participate or withdraw from participation in pharmacy services at any time.    Cornelia Castañeda, PharmD  PGY-1 Pharmacy Resident  [phone]

## 2024-11-12 NOTE — ASSESSMENT & PLAN NOTE
Assessment:   - Patient's son, Javier manages his medications. I spoke to him today regarding Elver's diabetes management. His current A1c is 7.8%. We discussed trying to aim for a goal A1c of <7%.  - Went over diet modifications to reduce patient's blood sugar including monitoring carbohydrate intake to ~45-60 grams per meal.   - Patient is currently on Farxiga for his diabetes with a monthly co-pay of $146. He does not file taxes and collects social security. His son will be emailing patient's [phone] form to initiate application process for PAP.     Plan:    CONTINUE Farxiga 10 mg once daily   PAP pending  Script sent to [facility] pharmacy.   CONTINUE making positive diet/lifestyle modifications.  Follow up with clinical pharmacy in 1 week  Provided clinical pharmacist's number for any questions prior to follow up date ([phone]).

## 2024-11-14 ENCOUNTER — TELEPHONE (OUTPATIENT)
Dept: PHARMACY | Facility: HOSPITAL | Age: 74
End: 2024-11-14
Payer: MEDICARE

## 2024-11-14 PROCEDURE — RXMED WILLOW AMBULATORY MEDICATION CHARGE

## 2024-11-14 NOTE — TELEPHONE ENCOUNTER
Patient Assistance Program Application Status     Elver Mc is a 74 y.o. male who was referred to the Clinical Pharmacy Team by Manjinder Francis MD     We are pleased to inform you that your application for assistance has been approved.    This approval is valid through November 14, 2025  as long as the following criteria continue to be satisfied:     Your medication (Farxiga) remains covered under your current insurance plan.   Your prescriber does not discontinue therapy.   You do not seek reimbursement from any other private or government-funded programs for the  medication.    Under this program, the pharmacy will first bill your insurance plan for your specified medication. The [employer] Assistance Fund will then offset your copay balance, so that your out-of pocket expense for your medication will be $0.00.     Medication will be filled through [facility], and mailed to the patient. Contacted on the status of the approval. Provided the [facility] phone number, and made aware that they will be hearing from someone at [facility] to set up delivery for the prescriptions.     Please reach out to the Clinical Pharmacy Team if there are any further questions.     Follow up with Clinical Pharmacy Team 12/17/24 at 1 pm    Continue all meds under the continuation of care with the referring provider and clinical pharmacy team.    Verbal consent to manage patient's drug therapy was obtained from an individual authorized to act on behalf of a patient. They were informed they may decline to participate or withdraw from participation in pharmacy services at any time.     Cornelia Castañeda, PharmD  PGY-1 Pharmacy Resident  [phone]

## 2024-11-18 ENCOUNTER — PHARMACY VISIT (OUTPATIENT)
Dept: PHARMACY | Facility: CLINIC | Age: 74
End: 2024-11-18
Payer: COMMERCIAL

## 2024-11-19 ENCOUNTER — APPOINTMENT (OUTPATIENT)
Dept: PHARMACY | Facility: HOSPITAL | Age: 74
End: 2024-11-19
Payer: MEDICARE

## 2024-11-20 NOTE — PROGRESS NOTES
A My-Chart message has been sent to the patient for PATIENT ROUNDING with [facility]     Patient identification verified with 2 identifiers.    Location: [facility] - [address] [address] [facility]. [place], [place] [address] [phone]     Referring Physician: DR. ESCALANTE  Enrollment/ Re-enrollment date: Enrollment/Re-enrollment date: 2024  INR Goal: 2.0-3.0  INR monitoring is per [facility] protocol.  Anticoagulation Medication: warfarin 10 MG  Indication: atrial fibrillation    Subjective   Bleeding signs/symptoms: No    Bruising: No   Major bleeding event: No  Thrombosis signs/symptoms: No  Thromboembolic event: No  Missed doses: No  Extra doses: No  Medication changes: No  Dietary changes: No  Change in health: No  Change in activity: No  Alcohol: Yes  Other concerns: No    Upcoming Procedures:  Does the Patient Have any upcoming procedures that require interruption in anticoagulation therapy? no  Does the patient require bridging? no      Anticoagulation Summary  As of 3/11/2024      INR goal:  2.0-3.0   TTR:  55.9 % (5.5 mo)   INR used for dosin.40 (3/11/2024)   Weekly warfarin total:  80 mg               Assessment/Plan   Supratherapeutic     1. New dose:  Hold 2 doses 3/11 and 3/12.  Resume dosing schedule on Wed 3/13.       2. Next INR: 1 week      Education provided to patient during the visit:  Patient instructed to call in interim with questions, concerns and changes.   Patient educated on interactions between medications and warfarin.   Patient educated on dietary consistency in vitamin k consumption.   Patient educated on affects of alcohol consumption while taking warfarin.   Patient educated on signs of bleeding/clotting.   Patient educated on compliance with dosing, follow up appointments, and prescribed plan of care.

## 2024-11-25 ENCOUNTER — ANTICOAGULATION - WARFARIN VISIT (OUTPATIENT)
Dept: CARDIOLOGY | Facility: CLINIC | Age: 74
End: 2024-11-25
Payer: MEDICARE

## 2024-11-25 DIAGNOSIS — I48.92 ATRIAL FLUTTER, UNSPECIFIED TYPE (MULTI): ICD-10-CM

## 2024-11-25 DIAGNOSIS — I48.91 ATRIAL FIBRILLATION, UNSPECIFIED TYPE (MULTI): Primary | ICD-10-CM

## 2024-11-25 DIAGNOSIS — Z79.01 LONG TERM (CURRENT) USE OF ANTICOAGULANTS: ICD-10-CM

## 2024-11-25 LAB
POC INR: 2.6
POC PROTHROMBIN TIME: NORMAL

## 2024-11-25 PROCEDURE — 99211 OFF/OP EST MAY X REQ PHY/QHP: CPT

## 2024-11-25 PROCEDURE — 85610 PROTHROMBIN TIME: CPT | Mod: QW

## 2024-11-25 NOTE — PROGRESS NOTES
Patient identification verified with 2 identifiers.    Location: [facility] - [address] [address] [place], [place] [address] [phone]     Referring Physician: DR. STEPHEN ESCALANTE  Enrollment/ Re-enrollment date: 3/25/25   INR Goal: 2.0-3.0  INR monitoring is per [facility] protocol.  Anticoagulation Medication: warfarin  Indication: Atrial Fibrillation/Atrial Flutter    Subjective   Bleeding signs/symptoms: No    Bruising: No   Major bleeding event: No  Thrombosis signs/symptoms: No  Thromboembolic event: No  Missed doses: No  Extra doses: No  Medication changes: Yes  PT STARTED SODIUM BICARBONATE TWICE DAILY 2 WEEKS AGO  Dietary changes: No  Change in health: No  Change in activity: No  Alcohol: No  Other concerns: No    Upcoming Procedures:  Does the Patient Have any upcoming procedures that require interruption in anticoagulation therapy? no  Does the patient require bridging? no      Anticoagulation Summary  As of 2024      INR goal:  2.0-3.0   TTR:  59.3% (1.2 y)   INR used for dosin.60 (2024)   Weekly warfarin total:  50 mg               Assessment/Plan   Therapeutic     1. New dose: no change    2. Next INR: 2 weeks      Education provided to patient during the visit:  Patient instructed to call in interim with questions, concerns and changes.   Patient educated on compliance with dosing, follow up appointments, and prescribed plan of care.

## 2024-12-09 ENCOUNTER — ANTICOAGULATION - WARFARIN VISIT (OUTPATIENT)
Dept: CARDIOLOGY | Facility: CLINIC | Age: 74
End: 2024-12-09
Payer: MEDICARE

## 2024-12-09 DIAGNOSIS — I48.92 ATRIAL FLUTTER, UNSPECIFIED TYPE (MULTI): ICD-10-CM

## 2024-12-09 DIAGNOSIS — Z79.01 LONG TERM (CURRENT) USE OF ANTICOAGULANTS: ICD-10-CM

## 2024-12-09 DIAGNOSIS — I48.91 ATRIAL FIBRILLATION, UNSPECIFIED TYPE (MULTI): Primary | ICD-10-CM

## 2024-12-09 LAB
POC INR: 2
POC PROTHROMBIN TIME: NORMAL

## 2024-12-09 PROCEDURE — 85610 PROTHROMBIN TIME: CPT | Mod: QW

## 2024-12-09 PROCEDURE — 99211 OFF/OP EST MAY X REQ PHY/QHP: CPT

## 2024-12-09 PROCEDURE — RXMED WILLOW AMBULATORY MEDICATION CHARGE

## 2024-12-09 NOTE — PROGRESS NOTES
Patient identification verified with 2 identifiers.    Location: [facility] - [address] [address] [place], [place] [address] [phone]     Referring Physician: DR. STEPHEN ESCALANTE  Enrollment/ Re-enrollment date: 3/25/25   INR Goal: 2.0-3.0  INR monitoring is per [facility] protocol.  Anticoagulation Medication: warfarin  Indication: Atrial Fibrillation/Atrial Flutter    Subjective   Bleeding signs/symptoms: No    Bruising: No   Major bleeding event: No  Thrombosis signs/symptoms: No  Thromboembolic event: No  Missed doses: No  Extra doses: No  Medication changes: No  Dietary changes: No  Change in health: No  Change in activity: No  Alcohol: No  Other concerns: No    Upcoming Procedures:  Does the Patient Have any upcoming procedures that require interruption in anticoagulation therapy? no  Does the patient require bridging? no      Anticoagulation Summary  As of 2024      INR goal:  2.0-3.0   TTR:  60.7% (1.2 y)   INR used for dosin.00 (2024)   Weekly warfarin total:  50 mg               Assessment/Plan   Therapeutic     1. New dose: no change    2. Next INR: 4 weeks      Education provided to patient during the visit:  Patient instructed to call in interim with questions, concerns and changes.   Patient educated on compliance with dosing, follow up appointments, and prescribed plan of care.

## 2024-12-11 ENCOUNTER — PHARMACY VISIT (OUTPATIENT)
Dept: PHARMACY | Facility: CLINIC | Age: 74
End: 2024-12-11
Payer: COMMERCIAL

## 2024-12-17 ENCOUNTER — APPOINTMENT (OUTPATIENT)
Dept: PHARMACY | Facility: HOSPITAL | Age: 74
End: 2024-12-17
Payer: MEDICARE

## 2024-12-17 DIAGNOSIS — E11.69 TYPE 2 DIABETES MELLITUS WITH OTHER SPECIFIED COMPLICATION, WITHOUT LONG-TERM CURRENT USE OF INSULIN: ICD-10-CM

## 2024-12-17 NOTE — PROGRESS NOTES
I reviewed the progress note and agree with the resident’s findings and plans as written. Case discussed with resident.    Luanne Florian, PharmD  Clinical Pharmacy Specialist   [phone]

## 2024-12-17 NOTE — PROGRESS NOTES
Patient is sent at the request of Manjinder rFancis MD for my opinion regarding Type 2 diabetes.  My final recommendations will be communicated back to the requesting provider by way of shared medical record.    Subjective     Patient is a 74 year old male presenting for diabetes management. He has memory loss and his son, Javier manages his medications. Patent's most recent A1c is 7.8% and he is currently on Farxiga 10 mg daily and is approved for [facility] through November 14, 2025.       Past Medical History:  He has no past medical history on file.    Past Surgical History:  He has a past surgical history that includes Colonoscopy (12/05/2013); Hernia repair (12/06/2013); Colonoscopy (11/07/2014); Ablation of dysrhythmic focus (05/22/2017); and Colonoscopy (01/2021).    Social History:  He reports that he has quit smoking. His smoking use included cigarettes. He has a 15 pack-year smoking history. He has never used smokeless tobacco. He reports current alcohol use of about 5.0 standard drinks of alcohol per week. He reports that he does not use drugs.    Family History:  Family History   Problem Relation Name Age of Onset    Cancer Mother      Sleep apnea Brother         Allergies:  Iodinated contrast media  DIABETES MELLITUS TYPE 2:    Does patient follow with Endocrinology: No  Last optometry exam: Annually. Next one is February   Most recent visit in Podiatry: every 2 months, next appt in January    Current diabetic medications include:  Farxiga 10 mg daily     Past diabetic medications include:  Insulin - long acting. Took about 3 years ago.     Glucose Readings:  Glucometer/CGM Type: One touch ultra meter   Patient does not test blood sugar regularly. Discussed monitoring closely for the next month prior to starting glp1. Last reading was post-prandial 132 mg/dL.     Any episodes of hypoglycemia? N/A.  Did patient treat episode of hypoglycemia appropriately? N/A  Does the patient have a prescription for  ready-to-use Glucagon? Not on insulin    Lifestyle:  Diet: Son tries to cook at home 3 meals/day. About 3 times a month they go out to eat. Tries to limit carbs.   BK: eggs, cereal   LN: hotdog  Snacks: does not snack a lot   Drinks: Diet pepsi and a lot of water and chocolate milk.   Physical Activity: Walks around stores 3 times a week 1-2 hours. Gets winded walking a lot.     Secondary Prevention:  Statin? Yes, Atorvastatin 80 mg   ACE-I/ARB? Yes, Lisinopril 40 mg   Aspirin? No    Pertinent PMH Review:  PMH of Pancreatitis: No  PMH of Retinopathy: No  PMH of Urinary Tract Infections: No, patient denies any symptoms of UTI  PMH of MTC: No    Drug Interactions:  none    Objective     Last Recorded Vitals:  BP Readings from Last 6 Encounters:   10/29/24 114/76   09/10/24 145/88   07/30/24 140/86   04/09/24 105/67   01/25/24 136/80   11/15/23 134/80        Wt Readings from Last 6 Encounters:   10/29/24 119 kg (262 lb)   09/10/24 104 kg (230 lb 6.1 oz)   07/30/24 116 kg (255 lb)   04/30/24 106 kg (233 lb 8 oz)   01/25/24 108 kg (238 lb 6.4 oz)   11/15/23 107 kg (236 lb 6.4 oz)       BMI Readings from Last 6 Encounters:   10/29/24 39.26 kg/m²   09/10/24 34.52 kg/m²   07/30/24 38.77 kg/m²   04/30/24 35.50 kg/m²   04/09/24 36.25 kg/m²   01/25/24 36.25 kg/m²       Lab Review  Lab Results   Component Value Date    BILITOT 0.7 07/30/2024    CALCIUM 9.9 10/29/2024    CO2 20 (L) 10/29/2024     10/29/2024    CREATININE 2.09 (H) 10/29/2024    GLUCOSE 150 (H) 10/29/2024    ALKPHOS 80 07/30/2024    K 4.6 10/29/2024    PROT 7.2 07/30/2024     10/29/2024    AST 17 07/30/2024    ALT 19 07/30/2024    BUN 31 (H) 10/29/2024    ANIONGAP 15 10/29/2024    MG 2.02 01/09/2021    PHOS 3.9 10/29/2024    ALBUMIN 4.4 10/29/2024    GFRMALE 36 (A) 08/02/2023     Lab Results   Component Value Date    TRIG 89 07/30/2024    CHOL 134 07/30/2024    LDLCALC 69 07/30/2024    HDL 47.1 07/30/2024     Lab Results   Component Value Date     "HGBA1C 7.8 (A) 10/29/2024    HGBA1C 7.1 (A) 07/30/2024    HGBA1C 6.6 (A) 04/30/2024     No components found for: \"UACR\"  The ASCVD Risk score (Bonilla DEGROOT, et al., 2019) failed to calculate for the following reasons:    Risk score cannot be calculated because patient has a medical history suggesting prior/existing ASCVD      PATIENT EDUCATION/DISCUSSION:  - Counseled patient on Farxiga MOA, expectations, side effects, duration of therapy, contraindications, administration, and monitoring parameters  - Answered all patient questions and concerns      Assessment/Plan   Problem List Items Addressed This Visit    None    Assessment     - Assessed patient for urinary symptoms while on Farxiga. He denies any side effects and is tolerating the medication well.   - We discussed possibly initiating Ozempic in the future to further help with his A1c and obesity. The patient is interested. His son, Javier takes the medication and is educated on injection technique. Will discuss further in January.  - Discussed monitoring fasting blood sugars and keeping a record.        Plan     CONTINUE Farxiga 10 mg once daily   Provided pharmacist's phone number ([phone]) for any questions prior to follow up.   CONTINUE making positive lifestyle modifications  Monitor fasting blood sugars in the morning and keep a record of them.       Follow up with Clinical Pharmacy Team 1/14/2025 at 12:30    Time spent with pt: Total length of time 15 (minutes) of the encounter and more than 50% was spent counseling the patient.    Continue all meds under the continuation of care with the referring provider and clinical pharmacy team.    Please reach out to the Clinical Pharmacy Team if there are any further questions.     Verbal consent to manage patient's drug therapy was obtained from patient. They were informed they may decline to participate or withdraw from participation in pharmacy services at any time.    Cornelia Castañeda, PharmD  PGY-1 Pharmacy " Resident  [phone]

## 2025-01-02 ENCOUNTER — APPOINTMENT (OUTPATIENT)
Dept: CARDIOLOGY | Facility: CLINIC | Age: 75
End: 2025-01-02
Payer: MEDICARE

## 2025-01-02 DIAGNOSIS — Z79.01 LONG TERM (CURRENT) USE OF ANTICOAGULANTS: Primary | ICD-10-CM

## 2025-01-02 DIAGNOSIS — I48.91 ATRIAL FIBRILLATION, UNSPECIFIED TYPE (MULTI): ICD-10-CM

## 2025-01-02 LAB
POC INR: 1.7
POC PROTHROMBIN TIME: NORMAL

## 2025-01-02 PROCEDURE — 85610 PROTHROMBIN TIME: CPT | Mod: QW

## 2025-01-02 PROCEDURE — 99211 OFF/OP EST MAY X REQ PHY/QHP: CPT

## 2025-01-02 NOTE — PROGRESS NOTES
Patient identification verified with 2 identifiers.    Location: [facility] - [address] [address] [facility]. [place], [place] [address] [phone]     Referring Physician: Dr. Tang Pino   Enrollment/ Re-enrollment date: 3/25/2025   INR Goal: 2.0-3.0  INR monitoring is per [facility] protocol.  Anticoagulation Medication: warfarin  Indication: Atrial Fibrillation/Atrial Flutter    Subjective   Bleeding signs/symptoms: No    Bruising: No   Major bleeding event: No  Thrombosis signs/symptoms: No  Thromboembolic event: No  Missed doses: No  Extra doses: No  Medication changes: No  Dietary changes: Yes  Change in health: No  Change in activity: No  Alcohol: No  Other concerns: No    Upcoming Procedures:  Does the Patient Have any upcoming procedures that require interruption in anticoagulation therapy? no  Does the patient require bridging? no      Anticoagulation Summary  As of 2025      INR goal:  2.0-3.0   TTR:  57.6% (1.3 y)   INR used for dosin.70 (2025)   Weekly warfarin total:  50 mg               Assessment/Plan   Subtherapeutic     1. New dose: no change    2. Next INR: 1 week      Education provided to patient during the visit:  Patient instructed to call in interim with questions, concerns and changes.   Patient educated on dietary consistency in vitamin k consumption.   Patient educated on signs of bleeding/clotting.   Patient educated on compliance with dosing, follow up appointments, and prescribed plan of care.

## 2025-01-07 PROCEDURE — RXMED WILLOW AMBULATORY MEDICATION CHARGE

## 2025-01-09 ENCOUNTER — ANTICOAGULATION - WARFARIN VISIT (OUTPATIENT)
Dept: CARDIOLOGY | Facility: CLINIC | Age: 75
End: 2025-01-09
Payer: MEDICARE

## 2025-01-09 ENCOUNTER — PHARMACY VISIT (OUTPATIENT)
Dept: PHARMACY | Facility: CLINIC | Age: 75
End: 2025-01-09
Payer: COMMERCIAL

## 2025-01-09 DIAGNOSIS — I48.92 ATRIAL FLUTTER, UNSPECIFIED TYPE (MULTI): ICD-10-CM

## 2025-01-09 DIAGNOSIS — I48.91 ATRIAL FIBRILLATION, UNSPECIFIED TYPE (MULTI): Primary | ICD-10-CM

## 2025-01-09 DIAGNOSIS — Z79.01 LONG TERM (CURRENT) USE OF ANTICOAGULANTS: ICD-10-CM

## 2025-01-09 LAB
POC INR: 1.7
POC PROTHROMBIN TIME: NORMAL

## 2025-01-09 PROCEDURE — 99211 OFF/OP EST MAY X REQ PHY/QHP: CPT

## 2025-01-09 PROCEDURE — 85610 PROTHROMBIN TIME: CPT | Mod: QW

## 2025-01-09 NOTE — PROGRESS NOTES
Patient identification verified with 2 identifiers.    Location: [facility] - [address] [address] [place], [place] [address] [phone]     Referring Physician: Dr. Tang Pino   Enrollment/ Re-enrollment date: 3/25/2025   INR Goal: 2.0-3.0  INR monitoring is per [facility] protocol.  Anticoagulation Medication: warfarin  Indication: Atrial Fibrillation/Atrial Flutter  Subjective   Bleeding signs/symptoms: No    Bruising: No   Major bleeding event: No  Thrombosis signs/symptoms: No  Thromboembolic event: No  Missed doses: No  Extra doses: No  Medication changes: No  Dietary changes: No  Change in health: No  Change in activity: No  Alcohol: No  Other concerns: No    Upcoming Procedures:  Does the Patient Have any upcoming procedures that require interruption in anticoagulation therapy? no  Does the patient require bridging? no      Anticoagulation Summary  As of 2025      INR goal:  2.0-3.0   TTR:  56.7% (1.3 y)   INR used for dosin.70 (2025)   Weekly warfarin total:  55 mg               Assessment/Plan   Subtherapeutic     1. New dose: increase per protocol  2. Next INR: 1 week      Education provided to patient during the visit:  Patient instructed to call in interim with questions, concerns and changes.   Patient educated on compliance with dosing, follow up appointments, and prescribed plan of care.        
Acute encephalopathy

## 2025-01-13 NOTE — PROGRESS NOTES
Patient C/O of cough-green mucus, body aches, fever, headache, congestion-started around 2 am this morning.  Patient taken 1/500 Tylenol capsule around 5 am 12 pm Mucinex-some relief.  Report feverish 103.0/Known chills. SOB.  Loss of appetite/pushing fluids.   Patient is sent at the request of Manjinder Francis MD for my opinion regarding Type 2 diabetes.  My final recommendations will be communicated back to the requesting provider by way of shared medical record.    Subjective     Patient is a 74 year old male presenting for diabetes management. He has memory loss and his son, Javier manages his medications. Patent's most recent A1c is 7.8% and he is currently on Farxiga 10 mg daily and is approved for [facility] through November 14, 2025. We previously discussed initiating Ozempic to gain better blood glucose control in the future.       Past Medical History:  He has no past medical history on file.    Past Surgical History:  He has a past surgical history that includes Colonoscopy (12/05/2013); Hernia repair (12/06/2013); Colonoscopy (11/07/2014); Ablation of dysrhythmic focus (05/22/2017); and Colonoscopy (01/2021).    Social History:  He reports that he has quit smoking. His smoking use included cigarettes. He has a 15 pack-year smoking history. He has never used smokeless tobacco. He reports current alcohol use of about 5.0 standard drinks of alcohol per week. He reports that he does not use drugs.    Family History:  Family History   Problem Relation Name Age of Onset    Cancer Mother      Sleep apnea Brother         Allergies:  Iodinated contrast media  DIABETES MELLITUS TYPE 2:    Does patient follow with Endocrinology: No  Last optometry exam: Annually. Next one is February   Most recent visit in Podiatry: every 2 months, next appt in January    Current diabetic medications include:  Farxiga 10 mg daily     Past diabetic medications include:  Insulin - long acting. Took about 3 years ago.     Glucose Readings:  Glucometer/CGM Type: One touch ultra meter   1/13: 158  1/12: 139  1/11: 163  1/10: 135  1/9: 158  1/8: 162  1/7: 119  1/6: 111  *These readings were random. Some are fasting and some are post prandial.     Any episodes of hypoglycemia? N/A.  Did patient treat  episode of hypoglycemia appropriately? N/A  Does the patient have a prescription for ready-to-use Glucagon? Not on insulin    Lifestyle:  Diet: Son tries to cook at home 3 meals/day. About 3 times a month they go out to eat. Tries to limit carbs.   BK: eggs, cereal   LN: hotdog  Snacks: does not snack a lot   Drinks: Diet pepsi and a lot of water and chocolate milk.   Physical Activity: Walks around stores 3 times a week 1-2 hours. Gets winded walking a lot.     Secondary Prevention:  Statin? Yes, Atorvastatin 80 mg   ACE-I/ARB? Yes, Lisinopril 40 mg   Aspirin? No    Pertinent PMH Review:  PMH of Pancreatitis: No  PMH of Retinopathy: No  PMH of Urinary Tract Infections: No, patient denies any symptoms of UTI  PMH of MTC: No    Drug Interactions:  none    Objective     Last Recorded Vitals:  BP Readings from Last 6 Encounters:   10/29/24 114/76   09/10/24 145/88   07/30/24 140/86   04/09/24 105/67   01/25/24 136/80   11/15/23 134/80        Wt Readings from Last 6 Encounters:   10/29/24 119 kg (262 lb)   09/10/24 104 kg (230 lb 6.1 oz)   07/30/24 116 kg (255 lb)   04/30/24 106 kg (233 lb 8 oz)   01/25/24 108 kg (238 lb 6.4 oz)   11/15/23 107 kg (236 lb 6.4 oz)       BMI Readings from Last 6 Encounters:   10/29/24 39.26 kg/m²   09/10/24 34.52 kg/m²   07/30/24 38.77 kg/m²   04/30/24 35.50 kg/m²   04/09/24 36.25 kg/m²   01/25/24 36.25 kg/m²       Lab Review  Lab Results   Component Value Date    BILITOT 0.7 07/30/2024    CALCIUM 9.9 10/29/2024    CO2 20 (L) 10/29/2024     10/29/2024    CREATININE 2.09 (H) 10/29/2024    GLUCOSE 150 (H) 10/29/2024    ALKPHOS 80 07/30/2024    K 4.6 10/29/2024    PROT 7.2 07/30/2024     10/29/2024    AST 17 07/30/2024    ALT 19 07/30/2024    BUN 31 (H) 10/29/2024    ANIONGAP 15 10/29/2024    MG 2.02 01/09/2021    PHOS 3.9 10/29/2024    ALBUMIN 4.4 10/29/2024    GFRMALE 36 (A) 08/02/2023     Lab Results   Component Value Date    TRIG 89 07/30/2024    CHOL 134 07/30/2024    LDLCALC  "69 07/30/2024    HDL 47.1 07/30/2024     Lab Results   Component Value Date    HGBA1C 7.8 (A) 10/29/2024    HGBA1C 7.1 (A) 07/30/2024    HGBA1C 6.6 (A) 04/30/2024     No components found for: \"UACR\"  The ASCVD Risk score (Bonilla DEGROOT, et al., 2019) failed to calculate for the following reasons:    Risk score cannot be calculated because patient has a medical history suggesting prior/existing ASCVD      PATIENT EDUCATION/DISCUSSION:  - Counseled patient on Farxiga MOA, expectations, side effects, duration of therapy, contraindications, administration, and monitoring parameters  - Answered all patient questions and concerns      Ozempic Education:     - Counseled patient on Ozempic MOA, expectations, side effects, duration of therapy, administration, and monitoring parameters.  - Provided detailed dosing and administration counseling to ensure proper technique.   - Reviewed Ozempic titration schedule, starting with 0.25 mg once weekly for 4 weeks, then continuing on 0.5 mg once weekly. Pt verbalized understanding.  - Counseled patient on the benefits of GLP-1ra, such as cardiovascular risk reduction, glycemic control, and weight loss potential.  - Reviewed storage requirements of Ozempic when not in use, and when to administer the medication if a dose is missed.  - Advised patient that they may experience improved satiety after meals and portion sizes of meals may be reduced as doses of Ozempic increase.  - Counseled patient to avoid foods that are fatty/oily as this may precipitate the nausea/GI upset that may occur with new start Ozempic.     Assessment/Plan   Problem List Items Addressed This Visit    None    Assessment     - Assessed patient for urinary symptoms while on Farxiga. He denies any side effects and is tolerating the medication well.   - We discussed initiating Ozempic 0.25 mg. His son, Javier is  already on Ozempic and knows how to administer the medication. We went over side effects and injection " technique. Insurance covers Ozempic at a copay of $97/month but patient is enrolled in [facility] and Ozempic will be added to the program.   - Discussed monitoring fasting blood sugars and keeping a record.        Plan     CONTINUE Farxiga 10 mg once daily   Sent new script to [facility] pharmacy   Provided pharmacist's phone number ([phone]) for any questions prior to follow up.   START Ozempic 0.25 mg once weekly injection  Sent script to [facility] pharmacy   CONTINUE making positive lifestyle modifications  Monitor fasting blood sugars in the morning and keep a record of them.     Follow up with Clinical Pharmacy Team 2/11/25 at 1 pm     Time spent with pt: Total length of time 15 (minutes) of the encounter and more than 50% was spent counseling the patient.    Continue all meds under the continuation of care with the referring provider and clinical pharmacy team.    Please reach out to the Clinical Pharmacy Team if there are any further questions.     Verbal consent to manage patient's drug therapy was obtained from patient. They were informed they may decline to participate or withdraw from participation in pharmacy services at any time.    Cornelia Castañeda, PharmD  PGY-1 Pharmacy Resident  [phone]

## 2025-01-14 ENCOUNTER — APPOINTMENT (OUTPATIENT)
Dept: PHARMACY | Facility: HOSPITAL | Age: 75
End: 2025-01-14
Payer: MEDICARE

## 2025-01-14 DIAGNOSIS — E11.69 TYPE 2 DIABETES MELLITUS WITH OTHER SPECIFIED COMPLICATION, WITHOUT LONG-TERM CURRENT USE OF INSULIN: ICD-10-CM

## 2025-01-14 PROCEDURE — RXMED WILLOW AMBULATORY MEDICATION CHARGE

## 2025-01-14 RX ORDER — DAPAGLIFLOZIN 10 MG/1
10 TABLET, FILM COATED ORAL DAILY
Qty: 90 TABLET | Refills: 2 | Status: SHIPPED | OUTPATIENT
Start: 2025-01-14

## 2025-01-15 NOTE — PROGRESS NOTES
I reviewed the progress note and agree with the resident’s findings and plans as written. Case discussed with resident.    Luanne Florian, PharmD  Clinical Pharmacy Specialist   [phone]

## 2025-01-16 ENCOUNTER — PHARMACY VISIT (OUTPATIENT)
Dept: PHARMACY | Facility: CLINIC | Age: 75
End: 2025-01-16
Payer: COMMERCIAL

## 2025-01-20 ENCOUNTER — ANTICOAGULATION - WARFARIN VISIT (OUTPATIENT)
Dept: CARDIOLOGY | Facility: CLINIC | Age: 75
End: 2025-01-20
Payer: MEDICARE

## 2025-01-20 DIAGNOSIS — I48.91 ATRIAL FIBRILLATION, UNSPECIFIED TYPE (MULTI): Primary | ICD-10-CM

## 2025-01-20 DIAGNOSIS — Z79.01 LONG TERM (CURRENT) USE OF ANTICOAGULANTS: ICD-10-CM

## 2025-01-20 DIAGNOSIS — I48.92 ATRIAL FLUTTER, UNSPECIFIED TYPE (MULTI): ICD-10-CM

## 2025-01-20 LAB
POC INR: 2.5
POC PROTHROMBIN TIME: NORMAL

## 2025-01-20 PROCEDURE — 85610 PROTHROMBIN TIME: CPT | Mod: QW

## 2025-01-20 PROCEDURE — 99211 OFF/OP EST MAY X REQ PHY/QHP: CPT

## 2025-01-20 NOTE — PROGRESS NOTES
Patient identification verified with 2 identifiers.    Location: [facility] - [address] [address] [place], [place] [address] [phone]     Referring Physician: Dr. Tang Pino   Enrollment/ Re-enrollment date: 3/25/2025   INR Goal: 2.0-3.0  INR monitoring is per [facility] protocol.  Anticoagulation Medication: warfarin  Indication: Atrial Fibrillation/Atrial Flutter  Subjective   Bleeding signs/symptoms: No    Bruising: No   Major bleeding event: No  Thrombosis signs/symptoms: No  Thromboembolic event: No  Missed doses: No  Extra doses: No  Medication changes: No  Dietary changes: No  Change in health: No  Change in activity: No  Alcohol: No  Other concerns: No    Upcoming Procedures:  Does the Patient Have any upcoming procedures that require interruption in anticoagulation therapy? no  Does the patient require bridging? no      Anticoagulation Summary  As of 2025      INR goal:  2.0-3.0   TTR:  56.8% (1.3 y)   INR used for dosin.50 (2025)   Weekly warfarin total:  55 mg               Assessment/Plan   Therapeutic     1. New dose: no change    2. Next INR: 1 week      Education provided to patient during the visit:  Patient instructed to call in interim with questions, concerns and changes.   Patient educated on compliance with dosing, follow up appointments, and prescribed plan of care.

## 2025-01-22 DIAGNOSIS — K27.4 GASTROINTESTINAL HEMORRHAGE ASSOCIATED WITH PEPTIC ULCER: ICD-10-CM

## 2025-01-23 RX ORDER — PANTOPRAZOLE SODIUM 40 MG/1
40 TABLET, DELAYED RELEASE ORAL DAILY
Qty: 100 TABLET | Refills: 3 | Status: SHIPPED | OUTPATIENT
Start: 2025-01-23

## 2025-01-27 ENCOUNTER — ANTICOAGULATION - WARFARIN VISIT (OUTPATIENT)
Dept: CARDIOLOGY | Facility: CLINIC | Age: 75
End: 2025-01-27
Payer: MEDICARE

## 2025-01-27 DIAGNOSIS — I48.92 ATRIAL FLUTTER, UNSPECIFIED TYPE (MULTI): ICD-10-CM

## 2025-01-27 DIAGNOSIS — Z79.01 LONG TERM (CURRENT) USE OF ANTICOAGULANTS: ICD-10-CM

## 2025-01-27 DIAGNOSIS — I48.91 ATRIAL FIBRILLATION, UNSPECIFIED TYPE (MULTI): Primary | ICD-10-CM

## 2025-01-27 LAB
POC INR: 2.8
POC PROTHROMBIN TIME: NORMAL

## 2025-01-27 PROCEDURE — 85610 PROTHROMBIN TIME: CPT | Mod: QW

## 2025-01-27 PROCEDURE — 99211 OFF/OP EST MAY X REQ PHY/QHP: CPT | Performed by: INTERNAL MEDICINE

## 2025-01-27 NOTE — PROGRESS NOTES
Patient identification verified with 2 identifiers.    Location: [facility] - [address] [address] [place], [place] [address] [phone]     Referring Physician: Dr. Tang Pino   Enrollment/ Re-enrollment date: 3/25/2025   INR Goal: 2.0-3.0  INR monitoring is per [facility] protocol.  Anticoagulation Medication: warfarin  Indication: Atrial Fibrillation/Atrial Flutter  Subjective   Bleeding signs/symptoms: No    Bruising: No   Major bleeding event: No  Thrombosis signs/symptoms: No  Thromboembolic event: No  Missed doses: No  Extra doses: No  Medication changes: Yes  PT STARTED OZEMPIC LAST WEEK  Dietary changes: No  Change in health: No  Change in activity: No  Alcohol: No  Other concerns: No    Upcoming Procedures:  Does the Patient Have any upcoming procedures that require interruption in anticoagulation therapy? no  Does the patient require bridging? no      Anticoagulation Summary  As of 2025      INR goal:  2.0-3.0   TTR:  57.4% (1.3 y)   INR used for dosin.80 (2025)   Weekly warfarin total:  55 mg               Assessment/Plan   Therapeutic     1. New dose: no change    2. Next INR: 2 weeks      Education provided to patient during the visit:  Patient instructed to call in interim with questions, concerns and changes.   Patient educated on compliance with dosing, follow up appointments, and prescribed plan of care.

## 2025-01-28 ENCOUNTER — APPOINTMENT (OUTPATIENT)
Dept: PRIMARY CARE | Facility: CLINIC | Age: 75
End: 2025-01-28
Payer: MEDICARE

## 2025-01-28 VITALS
SYSTOLIC BLOOD PRESSURE: 128 MMHG | DIASTOLIC BLOOD PRESSURE: 78 MMHG | WEIGHT: 262 LBS | BODY MASS INDEX: 39.26 KG/M2 | HEART RATE: 82 BPM

## 2025-01-28 DIAGNOSIS — I48.91 ATRIAL FIBRILLATION, UNSPECIFIED TYPE (MULTI): ICD-10-CM

## 2025-01-28 DIAGNOSIS — C61 PROSTATE CANCER (MULTI): ICD-10-CM

## 2025-01-28 DIAGNOSIS — I42.9 CARDIOMYOPATHY, UNSPECIFIED TYPE (MULTI): ICD-10-CM

## 2025-01-28 DIAGNOSIS — I10 BENIGN ESSENTIAL HYPERTENSION: ICD-10-CM

## 2025-01-28 DIAGNOSIS — E66.01 CLASS 2 SEVERE OBESITY DUE TO EXCESS CALORIES WITH SERIOUS COMORBIDITY AND BODY MASS INDEX (BMI) OF 39.0 TO 39.9 IN ADULT: ICD-10-CM

## 2025-01-28 DIAGNOSIS — N18.32 STAGE 3B CHRONIC KIDNEY DISEASE (MULTI): ICD-10-CM

## 2025-01-28 DIAGNOSIS — E11.69 TYPE 2 DIABETES MELLITUS WITH OTHER SPECIFIED COMPLICATION, WITHOUT LONG-TERM CURRENT USE OF INSULIN: ICD-10-CM

## 2025-01-28 DIAGNOSIS — E04.2 MULTIPLE THYROID NODULES: ICD-10-CM

## 2025-01-28 DIAGNOSIS — F03.A0 MILD DEMENTIA, UNSPECIFIED DEMENTIA TYPE, UNSPECIFIED WHETHER BEHAVIORAL, PSYCHOTIC, OR MOOD DISTURBANCE OR ANXIETY: Primary | ICD-10-CM

## 2025-01-28 DIAGNOSIS — I48.92 ATRIAL FLUTTER, UNSPECIFIED TYPE (MULTI): ICD-10-CM

## 2025-01-28 DIAGNOSIS — E66.01 SEVERE OBESITY (BMI 35.0-35.9 WITH COMORBIDITY) (MULTI): ICD-10-CM

## 2025-01-28 DIAGNOSIS — E66.812 CLASS 2 SEVERE OBESITY DUE TO EXCESS CALORIES WITH SERIOUS COMORBIDITY AND BODY MASS INDEX (BMI) OF 39.0 TO 39.9 IN ADULT: ICD-10-CM

## 2025-01-28 LAB
POC FINGERSTICK BLOOD GLUCOSE: 133 MG/DL (ref 70–100)
POC HEMOGLOBIN A1C: 9.2 % (ref 4.2–6.5)

## 2025-01-28 PROCEDURE — 99214 OFFICE O/P EST MOD 30 MIN: CPT | Performed by: INTERNAL MEDICINE

## 2025-01-28 PROCEDURE — 1160F RVW MEDS BY RX/DR IN RCRD: CPT | Performed by: INTERNAL MEDICINE

## 2025-01-28 PROCEDURE — G0439 PPPS, SUBSEQ VISIT: HCPCS | Performed by: INTERNAL MEDICINE

## 2025-01-28 PROCEDURE — 3074F SYST BP LT 130 MM HG: CPT | Performed by: INTERNAL MEDICINE

## 2025-01-28 PROCEDURE — 3078F DIAST BP <80 MM HG: CPT | Performed by: INTERNAL MEDICINE

## 2025-01-28 PROCEDURE — 82962 GLUCOSE BLOOD TEST: CPT | Performed by: INTERNAL MEDICINE

## 2025-01-28 PROCEDURE — 83036 HEMOGLOBIN GLYCOSYLATED A1C: CPT | Performed by: INTERNAL MEDICINE

## 2025-01-28 PROCEDURE — 1126F AMNT PAIN NOTED NONE PRSNT: CPT | Performed by: INTERNAL MEDICINE

## 2025-01-28 PROCEDURE — 4010F ACE/ARB THERAPY RXD/TAKEN: CPT | Performed by: INTERNAL MEDICINE

## 2025-01-28 PROCEDURE — 1036F TOBACCO NON-USER: CPT | Performed by: INTERNAL MEDICINE

## 2025-01-28 PROCEDURE — 1159F MED LIST DOCD IN RCRD: CPT | Performed by: INTERNAL MEDICINE

## 2025-01-28 PROCEDURE — 1170F FXNL STATUS ASSESSED: CPT | Performed by: INTERNAL MEDICINE

## 2025-01-28 ASSESSMENT — ENCOUNTER SYMPTOMS
JOINT SWELLING: 0
ANAL BLEEDING: 0
NUMBNESS: 0
ABDOMINAL PAIN: 0
POLYDIPSIA: 0
CHILLS: 0
ARTHRALGIAS: 0
FATIGUE: 0
NAUSEA: 0
EYE DISCHARGE: 0
SINUS PRESSURE: 0
ABDOMINAL DISTENTION: 0
TREMORS: 0
SORE THROAT: 0
COUGH: 0
LIGHT-HEADEDNESS: 0
VOICE CHANGE: 0
MYALGIAS: 0
COLOR CHANGE: 0
HEMATURIA: 0
FREQUENCY: 0
SPEECH DIFFICULTY: 0
WHEEZING: 0
EYE ITCHING: 0
WEAKNESS: 0
SINUS PAIN: 0
CHEST TIGHTNESS: 0
STRIDOR: 0
BRUISES/BLEEDS EASILY: 0
DIFFICULTY URINATING: 0
NECK PAIN: 0
DIAPHORESIS: 0
FACIAL SWELLING: 0
NECK STIFFNESS: 0
CONSTIPATION: 0
HEADACHES: 0
WOUND: 0
BACK PAIN: 0
ACTIVITY CHANGE: 0
SHORTNESS OF BREATH: 0
RHINORRHEA: 0
SEIZURES: 0
DIARRHEA: 0
APPETITE CHANGE: 0
EYE PAIN: 0
ADENOPATHY: 0
RECTAL PAIN: 0
TROUBLE SWALLOWING: 0
POLYPHAGIA: 0
DIZZINESS: 0
CHOKING: 0
FACIAL ASYMMETRY: 0
PHOTOPHOBIA: 0
VOMITING: 0
FLANK PAIN: 0
DYSURIA: 0
BLOOD IN STOOL: 0
PALPITATIONS: 0
EYE REDNESS: 0
SLEEP DISTURBANCE: 0

## 2025-01-28 ASSESSMENT — ACTIVITIES OF DAILY LIVING (ADL)
DRESSING: INDEPENDENT
BATHING: INDEPENDENT
DOING_HOUSEWORK: INDEPENDENT
MANAGING_FINANCES: INDEPENDENT
GROCERY_SHOPPING: INDEPENDENT
TAKING_MEDICATION: INDEPENDENT

## 2025-01-28 ASSESSMENT — PAIN SCALES - GENERAL: PAINLEVEL_OUTOF10: 0-NO PAIN

## 2025-01-28 NOTE — PROGRESS NOTES
Subjective   Patient ID: Elver Mc is a 74 y.o. male who presents for Medicare Annual Wellness Visit Subsequent.    Patient presents for wellness exam and follow-up.  He has been compliant with his medications, diet but not exercise.  He was recently started on Ozempic.  He was started on sodium bicarb by his nephrologist.  He overall feels well.  He denies any headaches, no dizziness, no chest pain or shortness of breath.  He denies abdominal pain no nausea vomiting or diarrhea.  He denies any new musculoskeletal complaints.         Review of Systems   Constitutional:  Negative for activity change, appetite change, chills, diaphoresis and fatigue.   HENT:  Negative for congestion, dental problem, drooling, ear discharge, ear pain, facial swelling, hearing loss, mouth sores, nosebleeds, postnasal drip, rhinorrhea, sinus pressure, sinus pain, sneezing, sore throat, tinnitus, trouble swallowing and voice change.    Eyes:  Negative for photophobia, pain, discharge, redness, itching and visual disturbance.   Respiratory:  Negative for cough, choking, chest tightness, shortness of breath, wheezing and stridor.    Cardiovascular:  Negative for chest pain, palpitations and leg swelling.   Gastrointestinal:  Negative for abdominal distention, abdominal pain, anal bleeding, blood in stool, constipation, diarrhea, nausea, rectal pain and vomiting.   Endocrine: Negative for cold intolerance, heat intolerance, polydipsia, polyphagia and polyuria.   Genitourinary:  Negative for decreased urine volume, difficulty urinating, dysuria, enuresis, flank pain, frequency, genital sores, hematuria and urgency.   Musculoskeletal:  Negative for arthralgias, back pain, gait problem, joint swelling, myalgias, neck pain and neck stiffness.   Skin:  Negative for color change, pallor, rash and wound.   Neurological:  Negative for dizziness, tremors, seizures, syncope, facial asymmetry, speech difficulty, weakness, light-headedness,  numbness and headaches.   Hematological:  Negative for adenopathy. Does not bruise/bleed easily.   Psychiatric/Behavioral:  Negative for sleep disturbance.        Objective   /78   Pulse 82   Wt 119 kg (262 lb)   BMI 39.26 kg/m²     Physical Exam  Constitutional:       Appearance: Normal appearance.   Cardiovascular:      Rate and Rhythm: Normal rate and regular rhythm.      Heart sounds: No murmur heard.     No gallop.   Pulmonary:      Effort: No respiratory distress.      Breath sounds: No wheezing or rales.   Abdominal:      General: There is no distension.      Palpations: There is no mass.      Tenderness: There is no abdominal tenderness. There is no guarding.   Musculoskeletal:      Right lower leg: No edema.      Left lower leg: No edema.   Neurological:      Mental Status: He is alert.         Assessment/Plan   Diagnoses and all orders for this visit:  Mild dementia, unspecified dementia type, unspecified whether behavioral, psychotic, or mood disturbance or anxiety-follow-up with neurology.  Stable symptoms  Type 2 diabetes mellitus with other specified complication, without long-term current use of insulin-hemoglobin A1c was 9.2.  He was started on Ozempic.  They will schedule an ophthalmology appointment.  He will diet and exercise  -     POCT glycosylated hemoglobin (Hb A1C) manually resulted  -     POCT Fingerstick Glucose manually resulted  Atrial flutter, unspecified type (Multi)-rate is controlled.  Follow-up with cardiology  Cardiomyopathy, unspecified type (Multi)  Prostate cancer (Multi)-follow-up with urology  Severe obesity (BMI 35.0-35.9 with comorbidity) (Multi)-diet and exercise  Stage 3b chronic kidney disease (Multi)-follow-up with nephrology  Atrial fibrillation, unspecified type (Multi)-rate is controlled.  Will continue with anticoagulation  Benign essential hypertension-stable on present medication  Class 2 severe obesity due to excess calories with serious comorbidity and  body mass index (BMI) of 39.0 to 39.9 in adult  Multiple thyroid nodules-schedule follow-up with endocrinology.  Health maintenance-colonoscopy has been done.  They will schedule eye and dental appointment.  Get the RSV and COVID-vaccine at the pharmacy

## 2025-02-03 DIAGNOSIS — E78.5 HYPERLIPIDEMIA, UNSPECIFIED HYPERLIPIDEMIA TYPE: ICD-10-CM

## 2025-02-03 PROBLEM — Z96.1 PSEUDOPHAKIA: Status: ACTIVE | Noted: 2023-02-09

## 2025-02-03 PROBLEM — N52.9 IMPOTENCE OF ORGANIC ORIGIN: Status: ACTIVE | Noted: 2025-02-03

## 2025-02-03 PROBLEM — E66.9 OBESITY WITH BODY MASS INDEX 30 OR GREATER: Status: ACTIVE | Noted: 2025-02-03

## 2025-02-03 RX ORDER — SODIUM BICARBONATE 650 MG/1
TABLET ORAL
COMMUNITY
Start: 2024-12-06

## 2025-02-04 RX ORDER — ATORVASTATIN CALCIUM 80 MG/1
80 TABLET, FILM COATED ORAL DAILY
Qty: 100 TABLET | Refills: 3 | Status: SHIPPED | OUTPATIENT
Start: 2025-02-04

## 2025-02-10 ENCOUNTER — ANTICOAGULATION - WARFARIN VISIT (OUTPATIENT)
Dept: CARDIOLOGY | Facility: CLINIC | Age: 75
End: 2025-02-10
Payer: MEDICARE

## 2025-02-10 DIAGNOSIS — I48.91 ATRIAL FIBRILLATION, UNSPECIFIED TYPE (MULTI): Primary | ICD-10-CM

## 2025-02-10 DIAGNOSIS — Z79.01 LONG TERM (CURRENT) USE OF ANTICOAGULANTS: ICD-10-CM

## 2025-02-10 DIAGNOSIS — I48.92 ATRIAL FLUTTER, UNSPECIFIED TYPE (MULTI): ICD-10-CM

## 2025-02-10 LAB
POC INR: 2.2 (ref 2–3)
POC PROTHROMBIN TIME: NORMAL

## 2025-02-10 PROCEDURE — 99211 OFF/OP EST MAY X REQ PHY/QHP: CPT

## 2025-02-10 PROCEDURE — 85610 PROTHROMBIN TIME: CPT | Mod: QW

## 2025-02-10 PROCEDURE — RXMED WILLOW AMBULATORY MEDICATION CHARGE

## 2025-02-10 NOTE — PROGRESS NOTES
Patient identification verified with 2 identifiers.    Location: [facility] - [address] [address] [place], [place] [address] [phone]     Referring Physician: Dr. Tang Pino   Enrollment/ Re-enrollment date: 3/25/2025   INR Goal: 2.0-3.0  INR monitoring is per [facility] protocol.  Anticoagulation Medication: warfarin  Indication: Atrial Fibrillation/Atrial Flutter  Subjective   Bleeding signs/symptoms: No    Bruising: No   Major bleeding event: No  Thrombosis signs/symptoms: No  Thromboembolic event: No  Missed doses: No  Extra doses: No  Medication changes: No  Dietary changes: No  Change in health: No  Change in activity: No  Alcohol: No  Other concerns: No    Upcoming Procedures:  Does the Patient Have any upcoming procedures that require interruption in anticoagulation therapy? no  Does the patient require bridging? no      Anticoagulation Summary  As of 2/10/2025      INR goal:  2.0-3.0   TTR:  58.6% (1.4 y)   INR used for dosin.20 (2/10/2025)   Weekly warfarin total:  55 mg               Assessment/Plan   Therapeutic     1. New dose: no change    2. Next INR: 2 weeks      Education provided to patient during the visit:  Patient instructed to call in interim with questions, concerns and changes.   Patient educated on compliance with dosing, follow up appointments, and prescribed plan of care.

## 2025-02-11 ENCOUNTER — APPOINTMENT (OUTPATIENT)
Dept: PHARMACY | Facility: HOSPITAL | Age: 75
End: 2025-02-11
Payer: MEDICARE

## 2025-02-11 DIAGNOSIS — E11.69 TYPE 2 DIABETES MELLITUS WITH OTHER SPECIFIED COMPLICATION, WITHOUT LONG-TERM CURRENT USE OF INSULIN: ICD-10-CM

## 2025-02-11 PROCEDURE — RXMED WILLOW AMBULATORY MEDICATION CHARGE

## 2025-02-11 NOTE — PROGRESS NOTES
Patient is sent at the request of Manjinder Francis MD for my opinion regarding Type 2 diabetes.  My final recommendations will be communicated back to the requesting provider by way of shared medical record.    Subjective     Patient is a 74 year old male presenting for diabetes management. He has memory loss and his son, Javier manages his medications. Patent's most recent A1c is 7.8% and he is currently on Farxiga 10 mg daily and Ozempic 0.25 mg weekly injection.  Approved for  PAP through November 14, 2025. His son, Javier also uses Ozempic and manages patient's medications.      PAP through 11/14/2025      Past Medical History:  He has no past medical history on file.    Past Surgical History:  He has a past surgical history that includes Colonoscopy (12/05/2013); Hernia repair (12/06/2013); Colonoscopy (11/07/2014); Ablation of dysrhythmic focus (05/22/2017); and Colonoscopy (01/2021).    Social History:  He reports that he has quit smoking. His smoking use included cigarettes. He has a 15 pack-year smoking history. He has never used smokeless tobacco. He reports current alcohol use of about 5.0 standard drinks of alcohol per week. He reports that he does not use drugs.    Family History:  Family History   Problem Relation Name Age of Onset    Cancer Mother      Sleep apnea Brother         Allergies:  Iodinated contrast media  DIABETES MELLITUS TYPE 2:    Does patient follow with Endocrinology: No  Last optometry exam: Annually. Next appt: 2/21/25  Most recent visit in Podiatry: every 2 months, next appt in March    Current diabetic medications include:  Farxiga 10 mg daily   Ozempic 0.25 mg weekly    Past diabetic medications include:  Insulin - long acting. Took about 3 years ago.     Glucose Readings:  Glucometer/CGM Type: One touch ultra meter   1/31: 156  2/2: 127  2/3: 123  2/5: 117    *These readings were random. Some are fasting and some are post prandial.     Any episodes of hypoglycemia?  none    Lifestyle:  Diet: Son tries to cook at home 3 meals/day. About 3 times a month they go out to eat. Tries to limit carbs.   No changes in appetite yet  BK: eggs, cereal   LN: hotdog  Snacks: does not snack a lot   Drinks: Diet pepsi and a lot of water and chocolate milk.   Physical Activity: Walks around stores 3 times a week 1-2 hours. Gets winded walking a lot.     Secondary Prevention:  Statin? Yes, Atorvastatin 80 mg   ACE-I/ARB? Yes, Lisinopril 40 mg   Aspirin? No    Pertinent PMH Review:  PMH of Pancreatitis: No  PMH of Retinopathy: No  PMH of Urinary Tract Infections: No, patient denies any symptoms of UTI  PMH of MTC: No    Drug Interactions:  none    Objective     Last Recorded Vitals:  BP Readings from Last 6 Encounters:   01/28/25 128/78   10/29/24 114/76   09/10/24 145/88   07/30/24 140/86   04/09/24 105/67   01/25/24 136/80        Wt Readings from Last 6 Encounters:   01/28/25 119 kg (262 lb)   10/29/24 119 kg (262 lb)   09/10/24 104 kg (230 lb 6.1 oz)   07/30/24 116 kg (255 lb)   04/30/24 106 kg (233 lb 8 oz)   01/25/24 108 kg (238 lb 6.4 oz)       BMI Readings from Last 6 Encounters:   01/28/25 39.26 kg/m²   10/29/24 39.26 kg/m²   09/10/24 34.52 kg/m²   07/30/24 38.77 kg/m²   04/30/24 35.50 kg/m²   04/09/24 36.25 kg/m²       Lab Review  Lab Results   Component Value Date    BILITOT 0.7 07/30/2024    CALCIUM 9.9 10/29/2024    CO2 20 (L) 10/29/2024     10/29/2024    CREATININE 2.09 (H) 10/29/2024    GLUCOSE 150 (H) 10/29/2024    ALKPHOS 80 07/30/2024    K 4.6 10/29/2024    PROT 7.2 07/30/2024     10/29/2024    AST 17 07/30/2024    ALT 19 07/30/2024    BUN 31 (H) 10/29/2024    ANIONGAP 15 10/29/2024    MG 2.02 01/09/2021    PHOS 3.9 10/29/2024    ALBUMIN 4.4 10/29/2024    GFRMALE 36 (A) 08/02/2023     Lab Results   Component Value Date    TRIG 89 07/30/2024    CHOL 134 07/30/2024    LDLCALC 69 07/30/2024    HDL 47.1 07/30/2024     Lab Results   Component Value Date    HGBA1C 9.2 (A)  "01/28/2025    HGBA1C 7.8 (A) 10/29/2024    HGBA1C 7.1 (A) 07/30/2024     No components found for: \"UACR\"  The ASCVD Risk score (Bonilla DEGROOT, et al., 2019) failed to calculate for the following reasons:    Risk score cannot be calculated because patient has a medical history suggesting prior/existing ASCVD      PATIENT EDUCATION/DISCUSSION:  - Counseled patient on Farxiga MOA, expectations, side effects, duration of therapy, contraindications, administration, and monitoring parameters  - Answered all patient questions and concerns      Ozempic Education:     - Counseled patient on Ozempic MOA, expectations, side effects, duration of therapy, administration, and monitoring parameters.  - Provided detailed dosing and administration counseling to ensure proper technique.   - Reviewed Ozempic titration schedule, starting with 0.25 mg once weekly for 4 weeks, then continuing on 0.5 mg once weekly. Pt verbalized understanding.  - Counseled patient on the benefits of GLP-1ra, such as cardiovascular risk reduction, glycemic control, and weight loss potential.  - Reviewed storage requirements of Ozempic when not in use, and when to administer the medication if a dose is missed.  - Advised patient that they may experience improved satiety after meals and portion sizes of meals may be reduced as doses of Ozempic increase.  - Counseled patient to avoid foods that are fatty/oily as this may precipitate the nausea/GI upset that may occur with new start Ozempic.     Assessment/Plan   Problem List Items Addressed This Visit    None    Assessment     - Patient has been tolerating Ozempic and has had 4 doses of the 0.25 mg without any side effects.   - A1c has increased from 7.8% to 9.2%.   - Discussed monitoring fasting blood sugars and keeping a record at least 3 times a week first thing in the AM and monitoring weight (current BMI 39 kg/m2)  - SonJavier is also on Ozempic and aware of potential side effects to monitor for. We " reviewed practicing mindful eating habits to minimize GI symptoms.   - Will increase Ozempic to 0.5 mg weekly injection due to elevated A1c and tolerating initial dose.       Plan     CONTINUE Farxiga 10 mg once daily   Provided pharmacist's phone number ([phone]) for any questions prior to follow up.   INCREASE Ozempic to 0.5 mg once weekly injection  Sent script to [facility] pharmacy   CONTINUE making positive lifestyle modifications  Monitor fasting blood sugars in the morning and keep a record of them.       Follow up with Clinical Pharmacy Team 3/13/2025 at 1 pm    Time spent with pt: Total length of time 15 (minutes) of the encounter and more than 50% was spent counseling the patient.    Continue all meds under the continuation of care with the referring provider and clinical pharmacy team.    Please reach out to the Clinical Pharmacy Team if there are any further questions.     Verbal consent to manage patient's drug therapy was obtained from patient. They were informed they may decline to participate or withdraw from participation in pharmacy services at any time.    Cornelia Castañeda, PharmD  PGY-1 Pharmacy Resident  [phone]

## 2025-02-11 NOTE — PROGRESS NOTES
I reviewed the progress note and agree with the resident’s findings and plans as written. Case discussed with resident.    Luanne Florian, PharmD  Clinical Pharmacy Specialist   [phone]

## 2025-02-12 ENCOUNTER — PHARMACY VISIT (OUTPATIENT)
Dept: PHARMACY | Facility: CLINIC | Age: 75
End: 2025-02-12
Payer: COMMERCIAL

## 2025-02-24 ENCOUNTER — ANTICOAGULATION - WARFARIN VISIT (OUTPATIENT)
Dept: CARDIOLOGY | Facility: CLINIC | Age: 75
End: 2025-02-24
Payer: MEDICARE

## 2025-02-24 DIAGNOSIS — I48.92 ATRIAL FLUTTER, UNSPECIFIED TYPE (MULTI): ICD-10-CM

## 2025-02-24 DIAGNOSIS — I48.91 ATRIAL FIBRILLATION, UNSPECIFIED TYPE (MULTI): Primary | ICD-10-CM

## 2025-02-24 DIAGNOSIS — Z79.01 LONG TERM (CURRENT) USE OF ANTICOAGULANTS: ICD-10-CM

## 2025-02-24 LAB
POC INR: 2
POC PROTHROMBIN TIME: NORMAL

## 2025-02-24 PROCEDURE — 99211 OFF/OP EST MAY X REQ PHY/QHP: CPT

## 2025-02-24 PROCEDURE — 85610 PROTHROMBIN TIME: CPT | Mod: QW

## 2025-02-24 NOTE — PROGRESS NOTES
Patient identification verified with 2 identifiers.    Location: [facility] - [address] [address] [place], [place] [address] [phone]     Referring Physician: Dr. Tang Pino   Enrollment/ Re-enrollment date: 3/25/2025 ( SENT ON 25 to MD MELISSA)  INR Goal: 2.0-3.0  INR monitoring is per [facility] protocol.  Anticoagulation Medication: warfarin  Indication: Atrial Fibrillation/Atrial Flutter    Subjective   Bleeding signs/symptoms: No    Bruising: No   Major bleeding event: No  Thrombosis signs/symptoms: No  Thromboembolic event: No  Missed doses: No  Extra doses: No  Medication changes: Yes  OZEMPIC DOSE WAS INCREASED  Dietary changes: No  Change in health: No  Change in activity: No  Alcohol: No  Other concerns: No    Upcoming Procedures:  Does the Patient Have any upcoming procedures that require interruption in anticoagulation therapy? no  Does the patient require bridging? no      Anticoagulation Summary  As of 2025      INR goal:  2.0-3.0   TTR:  59.7% (1.4 y)   INR used for dosin.00 (2025)   Weekly warfarin total:  55 mg               Assessment/Plan   Therapeutic     1. New dose: no change    2. Next INR: 1 month      Education provided to patient during the visit:  Patient instructed to call in interim with questions, concerns and changes.   Patient educated on compliance with dosing, follow up appointments, and prescribed plan of care.

## 2025-03-11 ENCOUNTER — APPOINTMENT (OUTPATIENT)
Dept: NEUROLOGY | Facility: HOSPITAL | Age: 75
End: 2025-03-11
Payer: MEDICARE

## 2025-03-12 NOTE — PROGRESS NOTES
Patient is sent at the request of Manjinder Francis MD for my opinion regarding Type 2 diabetes.  My final recommendations will be communicated back to the requesting provider by way of shared medical record.    Subjective     Patient is a 74 year old male presenting for diabetes management. He has memory loss and his son, Javier manages his medications. Recent A1c is 9.2% and he is currently on Farxiga 10 mg daily and Ozempic 0.5 mg weekly injection.  Approved for  PAP through November 14, 2025. His son, Javier also uses Ozempic and manages patient's medications.      PAP through 11/14/2025      Past Medical History:  He has no past medical history on file.    Past Surgical History:  He has a past surgical history that includes Colonoscopy (12/05/2013); Hernia repair (12/06/2013); Colonoscopy (11/07/2014); Ablation of dysrhythmic focus (05/22/2017); and Colonoscopy (01/2021).    Social History:  He reports that he has quit smoking. His smoking use included cigarettes. He has a 15 pack-year smoking history. He has never used smokeless tobacco. He reports current alcohol use of about 5.0 standard drinks of alcohol per week. He reports that he does not use drugs.    Family History:  Family History   Problem Relation Name Age of Onset    Cancer Mother      Sleep apnea Brother         Allergies:  Iodinated contrast media  DIABETES MELLITUS TYPE 2:    Does patient follow with Endocrinology: No  Last optometry exam: 3/5/25  Most recent visit in Podiatry: every 2 months, next appt in March    Current diabetic medications include:  Farxiga 10 mg daily   Ozempic 0.5 mg weekly    Past diabetic medications include:  Insulin - long acting. Took about 3 years ago.     Glucose Readings:  Glucometer/CGM Type: One touch ultra meter   3/10: 96 mg/dL   3/11: 104 mg/dL   3/12: 104 mg/dL   3/13: 101 mg/dL     *These readings were random. Some are fasting and some are post prandial.     Any episodes of hypoglycemia?  none    Lifestyle:  Diet: Son tries to cook at home 3 meals/day. About 3 times a month they go out to eat. Tries to limit carbs.   No changes in appetite yet  BK: oatmeal   LN: hotdog  Snacks: does not snack a lot   Drinks: Diet pepsi and a lot of water and chocolate milk.   Physical Activity: Walks around stores 3 times a week 1-2 hours. Gets winded walking a lot.     Secondary Prevention:  Statin? Yes, Atorvastatin 80 mg   ACE-I/ARB? Yes, Lisinopril 40 mg   Aspirin? No    Pertinent PMH Review:  PMH of Pancreatitis: No  PMH of Retinopathy: No  PMH of Urinary Tract Infections: No, patient denies any symptoms of UTI  PMH of MTC: No    Drug Interactions:  none    Objective     Last Recorded Vitals:  BP Readings from Last 6 Encounters:   01/28/25 128/78   10/29/24 114/76   09/10/24 145/88   07/30/24 140/86   04/09/24 105/67   01/25/24 136/80        Wt Readings from Last 6 Encounters:   01/28/25 119 kg (262 lb)   10/29/24 119 kg (262 lb)   09/10/24 104 kg (230 lb 6.1 oz)   07/30/24 116 kg (255 lb)   04/30/24 106 kg (233 lb 8 oz)   01/25/24 108 kg (238 lb 6.4 oz)       BMI Readings from Last 6 Encounters:   01/28/25 39.26 kg/m²   10/29/24 39.26 kg/m²   09/10/24 34.52 kg/m²   07/30/24 38.77 kg/m²   04/30/24 35.50 kg/m²   04/09/24 36.25 kg/m²       Lab Review  Lab Results   Component Value Date    BILITOT 0.7 07/30/2024    CALCIUM 9.9 10/29/2024    CO2 20 (L) 10/29/2024     10/29/2024    CREATININE 2.09 (H) 10/29/2024    GLUCOSE 150 (H) 10/29/2024    ALKPHOS 80 07/30/2024    K 4.6 10/29/2024    PROT 7.2 07/30/2024     10/29/2024    AST 17 07/30/2024    ALT 19 07/30/2024    BUN 31 (H) 10/29/2024    ANIONGAP 15 10/29/2024    MG 2.02 01/09/2021    PHOS 3.9 10/29/2024    ALBUMIN 4.4 10/29/2024    GFRMALE 36 (A) 08/02/2023     Lab Results   Component Value Date    TRIG 89 07/30/2024    CHOL 134 07/30/2024    LDLCALC 69 07/30/2024    HDL 47.1 07/30/2024     Lab Results   Component Value Date    HGBA1C 9.2 (A)  "01/28/2025    HGBA1C 7.8 (A) 10/29/2024    HGBA1C 7.1 (A) 07/30/2024     No components found for: \"UACR\"  The ASCVD Risk score (Bonilla DEGROOT, et al., 2019) failed to calculate for the following reasons:    Risk score cannot be calculated because patient has a medical history suggesting prior/existing ASCVD      PATIENT EDUCATION/DISCUSSION:  - Counseled patient on Farxiga MOA, expectations, side effects, duration of therapy, contraindications, administration, and monitoring parameters  - Answered all patient questions and concerns      Ozempic Education:     - Counseled patient on Ozempic MOA, expectations, side effects, duration of therapy, administration, and monitoring parameters.  - Provided detailed dosing and administration counseling to ensure proper technique.   - Reviewed Ozempic titration schedule, starting with 0.25 mg once weekly for 4 weeks, then continuing on 0.5 mg once weekly. Pt verbalized understanding.  - Counseled patient on the benefits of GLP-1ra, such as cardiovascular risk reduction, glycemic control, and weight loss potential.  - Reviewed storage requirements of Ozempic when not in use, and when to administer the medication if a dose is missed.  - Advised patient that they may experience improved satiety after meals and portion sizes of meals may be reduced as doses of Ozempic increase.  - Counseled patient to avoid foods that are fatty/oily as this may precipitate the nausea/GI upset that may occur with new start Ozempic.     Assessment/Plan   Problem List Items Addressed This Visit       Type 2 diabetes mellitus with other specified complication, without long-term current use of insulin    Relevant Medications    semaglutide (OZEMPIC) 1 mg/dose (4 mg/3 mL) pen injector    Other Relevant Orders    Referral to Clinical Pharmacy     Assessment     - Patient has been tolerating Ozempic 0.5 mg without any side effects.   - Since dose increase, the highest fasting BG was 110 mg/dL and the lowest " was 94 mg/dL.  - Discussed monitoring fasting blood sugars closer due to dose increase but son and patient deny any hypoglycemic symptoms.   - Son states he has 3 more doses of the 0.5 mg left.  - Will increase Ozempic to 1 mg weekly injection once patient has completed last 3 doses of 0.5 mg.     Plan     CONTINUE Farxiga 10 mg once daily   Provided pharmacist's phone number ([phone]) for any questions prior to follow up.   CONTINUE Ozempic to 0.5 mg once weekly injection for 3 more doses, then INCREASE to Ozempic 1 mg.   Sent script to [facility] pharmacy   CONTINUE making positive lifestyle modifications  Monitor fasting blood sugars in the morning and keep a record of them.       Follow up with Clinical Pharmacy Team 4/29/25 at 1 pm     Time spent with pt: Total length of time 10 (minutes) of the encounter and more than 50% was spent counseling the patient.    Continue all meds under the continuation of care with the referring provider and clinical pharmacy team.    Please reach out to the Clinical Pharmacy Team if there are any further questions.     Verbal consent to manage patient's drug therapy was obtained from patient. They were informed they may decline to participate or withdraw from participation in pharmacy services at any time.    Cornelia Castañeda, PharmD  PGY-1 Pharmacy Resident  [phone]

## 2025-03-13 ENCOUNTER — APPOINTMENT (OUTPATIENT)
Dept: PHARMACY | Facility: HOSPITAL | Age: 75
End: 2025-03-13
Payer: MEDICARE

## 2025-03-13 DIAGNOSIS — E11.69 TYPE 2 DIABETES MELLITUS WITH OTHER SPECIFIED COMPLICATION, WITHOUT LONG-TERM CURRENT USE OF INSULIN: ICD-10-CM

## 2025-03-13 PROCEDURE — RXMED WILLOW AMBULATORY MEDICATION CHARGE

## 2025-03-13 NOTE — PROGRESS NOTES
I reviewed the progress note and agree with the resident’s findings and plans as written. Case discussed with resident.    Luanne Florian, PharmD  Clinical Pharmacy Specialist   [phone]

## 2025-03-14 ENCOUNTER — OFFICE VISIT (OUTPATIENT)
Dept: NEUROLOGY | Facility: HOSPITAL | Age: 75
End: 2025-03-14
Payer: MEDICARE

## 2025-03-14 VITALS
BODY MASS INDEX: 38.86 KG/M2 | HEIGHT: 69 IN | HEART RATE: 83 BPM | DIASTOLIC BLOOD PRESSURE: 72 MMHG | SYSTOLIC BLOOD PRESSURE: 119 MMHG | RESPIRATION RATE: 18 BRPM | WEIGHT: 262.35 LBS

## 2025-03-14 DIAGNOSIS — F03.A0 MILD DEMENTIA, UNSPECIFIED DEMENTIA TYPE, UNSPECIFIED WHETHER BEHAVIORAL, PSYCHOTIC, OR MOOD DISTURBANCE OR ANXIETY: Primary | ICD-10-CM

## 2025-03-14 DIAGNOSIS — R41.3 MEMORY LOSS: ICD-10-CM

## 2025-03-14 PROCEDURE — 4010F ACE/ARB THERAPY RXD/TAKEN: CPT | Performed by: PSYCHIATRY & NEUROLOGY

## 2025-03-14 PROCEDURE — 99214 OFFICE O/P EST MOD 30 MIN: CPT | Performed by: PSYCHIATRY & NEUROLOGY

## 2025-03-14 PROCEDURE — 3074F SYST BP LT 130 MM HG: CPT | Performed by: PSYCHIATRY & NEUROLOGY

## 2025-03-14 PROCEDURE — 1159F MED LIST DOCD IN RCRD: CPT | Performed by: PSYCHIATRY & NEUROLOGY

## 2025-03-14 PROCEDURE — 3078F DIAST BP <80 MM HG: CPT | Performed by: PSYCHIATRY & NEUROLOGY

## 2025-03-14 PROCEDURE — G2211 COMPLEX E/M VISIT ADD ON: HCPCS | Performed by: PSYCHIATRY & NEUROLOGY

## 2025-03-14 PROCEDURE — 3008F BODY MASS INDEX DOCD: CPT | Performed by: PSYCHIATRY & NEUROLOGY

## 2025-03-14 PROCEDURE — 1036F TOBACCO NON-USER: CPT | Performed by: PSYCHIATRY & NEUROLOGY

## 2025-03-14 PROCEDURE — 1126F AMNT PAIN NOTED NONE PRSNT: CPT | Performed by: PSYCHIATRY & NEUROLOGY

## 2025-03-14 RX ORDER — DONEPEZIL HYDROCHLORIDE 10 MG/1
TABLET, FILM COATED ORAL
Qty: 90 TABLET | Refills: 3 | Status: SHIPPED | OUTPATIENT
Start: 2025-03-14

## 2025-03-14 ASSESSMENT — PAIN SCALES - GENERAL: PAINLEVEL_OUTOF10: 0-NO PAIN

## 2025-03-14 NOTE — PROGRESS NOTES
"History of present illness:  Mr. Mc  is a 74 -year-old  man with 12 years of formal education and PMHx of stroke in 2006, Afib on Warfarin, cardiomyopathy HTN, HLD, CKD, T2DM and prostate cancer who is here today for a follow-up visit for mild dementia. He is accompanied by his daughter, Justin.    Interval history:   He has been stable from functional and cognitive standpoints.   Appetite is good, sleep is fine, mood is good, no hallucinations, no falls since last visit.  He started on donepezil, no reported side effects. He is not driving anymore.  He has good control for urinary and bowel movements.      Prior history:  Brunilda has been noticing cognitive changes for less than a year. Onset was gradual, forgets scheduled events but uses a calendar, misplaces objects. Forgets conversations. Denies repeating self.   Denies any functional impairment. He lives with his son, Javier who manages finances and helps him with iADLs.     Mood is \"good.\". has not noticed any significant depressive symptoms. Gets frustrated with memory problems.   No excessive worry or anxiety.      Has good appetite - tries to eat healthy.      No change in personality, social disinhibition, change in dietary preferences, loss of empathy, stereotyped or repetitive behaviors.      He reports visual hallucinations, he sees people in the room, twice a week.   Possible fluctuating cognition,   No tremors, REM sleep behavior disorder,  falls.      Functional changes:   Born and raised in [place], [place]   Sleep: goes to bed at 8 pm wakes up at 6 am, takes a nap during the day  Current living situation - lives with his son, Javier in 2-story house .   Driving -he was getting lost, stopped driving in December 2023.   Finances - His son helps managing finances  Cooking -he occasionally cooks, left the stove on before   ADLS - independent in most of ADLs  Medications - Takes own medication; uses pillbox.   Weapons at home: no  Knows 911? yes   " "  Neuropsychiatric symptoms:   Depression - denies depression. Appetite ok. Sleeping fine. No worthlessness/helplessness. No suicidal thoughts, intent or plans.   Anxiety - Denies.   Psychosis - Denies.   Ghada - Denies.   Suicidality - Denies.      Prior Work-up:   Normal TSH, Vit B12.   Neuropsychological testing: not completed     Past Neuropsychiatric History:  Handedness: right.   History of traumatic brain injury: None.   History of seizures: None  History of stroke: yes, in 2006.   Past psychiatric history: None     PMH/PSH:  As above, in addition     Meds: reviewed as listed     Allergies: reviewed as listed     Family history:   Psychiatric: None.   Dementia: None   Parkinsons disease: None  Huntingtons disease: None  ALS: None     Social History:   No Tobacco use, stopped 15 years ago, occasional alcohol use, no recreational drugs  . Has 2 children   Worked in a factory, retired in 2021     Mental Status Examination:  General appearance: Well-groomed, good eye contact, cooperative  Orientation: Alert and oriented to person, place, and time  Motor: no psychomotor agitation or retardation, stable gait  Speech: regular rate, rhythm, tone, and volume  Mood: \"good\"  Affect: stable, full range, with brightening, and mood congruent  Passive death wish: denies  Suicidal ideation: denies  Thought process: logical, linear, and goal-directed without loosening of associations  Thought content: no hallucinations, delusions, and not responding to internal stimuli  Insight/Judgment: good/good  Praxis: Transitive/Intransitive/Orofacial  Fund of knowledge: good  Recent and remote memory: good  Attention span and concentration: good  Language: normal receptive and expressive language without paraphrasic errors     MoCA- [place] Cognitive Assessment ( 04/09/2024  ) : 12/30   Visuospatial / Executive: 1/5  Naming: 3/3  Read List of Digits: 1/2  Read List of Letters: 0/1  Serial Sevens: 2/3   Language Repeat: 1/2 " "  Language Fluency: 1/1   Abstraction: 0/2   Delayed Recall: 0/5   Orientation: 2/6  Education:  12 years     \"f\"= [13], \"animals\"= [4]  Memory Index Score (MIS): 0/15  No agraphia or alexia  Completed 3-step Luria task  Negative applause sign  Head turning sign: yes      NEUROLOGICAL EXAMINATION     Opthalmoscopic:   Normal.  Fundi were well visualized with normal disc margins, clear vessels, and vascular pulsations, No disc edema.  The cup/disk ratio was not enlarged.  No hemorrhages or exudates were present in the posterior segments that were visualized.     Cranial nerves:  CN II: visual fields full to confrontation  CN III, IV, VI: Pupils round, reactive to light and accommodation.  Lids symmetric.  No ptosis.  Extra-occular muscles are intact with normal alignment.  No nystagmus  CN V: Facial sensation intact bilaterally.    CN VII: Normal and symmetric facial strength.  Nasolabial folds symmetric  CN VIII: Hearing intact to finger rub  CN IX: Palate elevates symmetrically.  CN XI: Normal shoulder shrug and neck turning  CN XII: Tongue midline, with normal bulk and strength, no fasciculations       Motor:  Muscle bulk was normal in all extremities.  5/5 strength in all extremities  Mild left head turn and right head tilt  Normal finger taps and hand opening  Paratonia in BLE  Reduced right arm swing     Reflexes:   Right UE     LEFT UE  BR: 2          BR: 2  Biceps: 2    Biceps: 2  Triceps: 2   Triceps: 2     RIGHT LE     LEFT LE  Knee: 2        Knee: 2  Ankle: 2       Ankle: 2     Negative Elia's reflex  No frontal release signs     Coordination:  Normal finger to nose testing and rapid alternating movements     Gait:  Able to stand from seated position with arms across chest  Stable gait     Sensory:  Negative Romberg's sign     ROS: All systems reviewed, pertinent positives noted in HPI      Provider impressions:  Mr. Mc  is a 74 -year-old RH man with 12  years of formal education and PMHx of stroke " in 2006, Afib on Warfarin, cardiomyopathy HTN, HLD, CKD, T2DM and prostate cancer who is here today for a follow-up visit for mild dementia. He is accompanied by his daughter, Justin. He has been having STM issues for the last two years, he lives with his son who helps him with iADLs, neurological examination is reassuring, he scored 12/30 on MoCA in 4/2024.  He continues donepezil with no reported side effects. Will increase it to 10 mg.  MRI brain wo contrast on 2/13/2024 showed mild brain parenchymal volume loss, small focus of encephalomalacia in medial left occipital lobe, chronic right thalamic infarct. There are scattered nonspecific white matter changes within the cerebral hemispheres bilaterally which while nonspecific, given the patient's age, likely represent sequelae of more remote small-vessel, no acute intracranial abnormalities. TSH and vitamin B12 recently checked and came back in normal range.  Given the history and today's evaluation, I suspect mild dementia with behavioral disturbance. He is stable from functional and cognitive standpoints.  Potential etiologies include Alzheimer's disease, vascular dementia, a multifactorial etiology can't be entirely ruled out.    PLAN:  Increase donepezil to 10 mg daily    I recommended adequate control of vascular risk factors.  We discussed fall prevention in details  Continue NOT to drive  We discussed strategies to remain physically and mentally active.  Follow up in 6 months or earlier if needed.    Please call [phone] if you have any questions.

## 2025-03-14 NOTE — PATIENT INSTRUCTIONS
You were seen today by Dr. Perkins.  It is a pleasure seeing you.    Given the history and today's evaluation, I suspect mild dementia without behavioral disturbance  Differential diagnosis include Alzheimer's disease, vascular dementia, a multifactorial etiology can't be ruled out.     PLAN:  Increase donepezil to 10 mg daily    I recommended adequate control of vascular risk factors.  We discussed fall prevention in details  Continue NOT to drive  We discussed strategies to remain physically and mentally active.  Follow up in 6 months or earlier if needed.    Please call [phone] if you have any questions.     General brain health guidelines:  - make sure your other medical conditions are well controlled (e.g., high blood pressure, high cholesterol, diabetes, sleep apnea etc)  - do not smoke or chew tobacco  - address any sensory deficits (e.g., proper glasses for poor eyesight, hearing aids for hearing loss)  - use a weekly pill box  - eat a heart healthy diet (e.g., lots of fruits and vegetables, low fat and cholesterol)  - exercise at least four days per week, 30 minutes at a time at an intensity that would make it difficult to converse with someone   - make sure you are getting at least 7 hours of quality sleep per night  - keep yourself mentally active daily by reading, playing cards, doing word searches, puzzles, etc.  - stay socially active by being part of a group or organization

## 2025-03-18 ENCOUNTER — APPOINTMENT (OUTPATIENT)
Dept: NEUROLOGY | Facility: HOSPITAL | Age: 75
End: 2025-03-18
Payer: MEDICARE

## 2025-03-18 ENCOUNTER — PHARMACY VISIT (OUTPATIENT)
Dept: PHARMACY | Facility: CLINIC | Age: 75
End: 2025-03-18
Payer: COMMERCIAL

## 2025-03-24 ENCOUNTER — ANTICOAGULATION - WARFARIN VISIT (OUTPATIENT)
Dept: CARDIOLOGY | Facility: CLINIC | Age: 75
End: 2025-03-24
Payer: MEDICARE

## 2025-03-24 DIAGNOSIS — Z79.01 LONG TERM (CURRENT) USE OF ANTICOAGULANTS: ICD-10-CM

## 2025-03-24 DIAGNOSIS — I48.92 ATRIAL FLUTTER, UNSPECIFIED TYPE (MULTI): Primary | ICD-10-CM

## 2025-03-24 DIAGNOSIS — I48.91 ATRIAL FIBRILLATION, UNSPECIFIED TYPE (MULTI): ICD-10-CM

## 2025-03-24 LAB
POC INR: 2.5
POC PROTHROMBIN TIME: NORMAL

## 2025-03-24 PROCEDURE — 99211 OFF/OP EST MAY X REQ PHY/QHP: CPT

## 2025-03-24 PROCEDURE — 85610 PROTHROMBIN TIME: CPT | Mod: QW

## 2025-03-24 NOTE — PROGRESS NOTES
Patient identification verified with 2 identifiers.    Location: [facility] - [address] [address] [facility]. [place], [place] [address] [phone]     Referring Physician: Dr. Francis   Enrollment/ Re-enrollment date: 26  INR Goal: 2.0-3.0  INR monitoring is per [facility] protocol.  Anticoagulation Medication: warfarin  Indication: Atrial Fibrillation/Atrial Flutter    Subjective   Bleeding signs/symptoms: No    Bruising: No   Major bleeding event: No  Thrombosis signs/symptoms: No  Thromboembolic event: No  Missed doses: No  Extra doses: No  Medication changes: Yes  PT'S DONEZEPRIL WAS INCREASED FROM 5 MG DAILY TO 10 MG DAILY LAST WEEK.  HE WILL ALSO HAVE AN INCREASE IN HIS OZEMPIC DOSE THIS UPCOMING FRIDAY.  Dietary changes: No  Change in health: No  Change in activity: No  Alcohol: No  Other concerns: No    Upcoming Procedures:  Does the Patient Have any upcoming procedures that require interruption in anticoagulation therapy? no  Does the patient require bridging? no      Anticoagulation Summary  As of 3/24/2025      INR goal:  2.0-3.0   TTR:  61.8% (1.5 y)   INR used for dosin.50 (3/24/2025)   Weekly warfarin total:  55 mg               Assessment/Plan   Therapeutic     1. New dose: no change    2. Next INR: 2 weeks  DUE TO UPCOMING OZEMPIC CHANGE.  PT CAN RETURN TO 4 WEEKS IF IN RANGE NEXT VISIT.      Education provided to patient during the visit:  Patient instructed to call in interim with questions, concerns and changes.   Patient educated on compliance with dosing, follow up appointments, and prescribed plan of care.

## 2025-04-07 ENCOUNTER — ANTICOAGULATION - WARFARIN VISIT (OUTPATIENT)
Dept: CARDIOLOGY | Facility: CLINIC | Age: 75
End: 2025-04-07
Payer: MEDICARE

## 2025-04-07 DIAGNOSIS — I48.92 ATRIAL FLUTTER, UNSPECIFIED TYPE (MULTI): ICD-10-CM

## 2025-04-07 DIAGNOSIS — I48.91 ATRIAL FIBRILLATION, UNSPECIFIED TYPE (MULTI): Primary | ICD-10-CM

## 2025-04-07 DIAGNOSIS — Z79.01 LONG TERM (CURRENT) USE OF ANTICOAGULANTS: ICD-10-CM

## 2025-04-07 LAB
POC INR: 3.5
POC PROTHROMBIN TIME: NORMAL

## 2025-04-07 PROCEDURE — 85610 PROTHROMBIN TIME: CPT | Mod: QW

## 2025-04-07 PROCEDURE — 99211 OFF/OP EST MAY X REQ PHY/QHP: CPT

## 2025-04-07 NOTE — PROGRESS NOTES
Patient identification verified with 2 identifiers.    Location: [facility] - [address] [address] [facility]. [place], [place] [address] [phone]     Referring Physician: Dr. Francis   Enrollment/ Re-enrollment date: 2/24/26  INR Goal: 2.0-3.0  INR monitoring is per [facility] protocol.  Anticoagulation Medication: warfarin  Indication: Atrial Fibrillation/Atrial Flutter    Subjective   Bleeding signs/symptoms: No    Bruising: No   Major bleeding event: No  Thrombosis signs/symptoms: No  Thromboembolic event: No  Missed doses: No  Extra doses: No  Medication changes: Yes  PT HAD SEVERAL MED CHANGES WITHIN THE PAST 2 WEEKS- SEE PREVIOUS NOTE  Dietary changes: No  Change in health: No  Change in activity: No  Alcohol: No  Other concerns: No    Upcoming Procedures:  Does the Patient Have any upcoming procedures that require interruption in anticoagulation therapy? no  Does the patient require bridging? no      Anticoagulation Summary  As of 4/7/2025      INR goal:  2.0-3.0   TTR:  61.5% (1.5 y)   INR used for dosing:  3.50 (4/7/2025)   Weekly warfarin total:  50 mg               Assessment/Plan   Supratherapeutic     1. New dose: WILL HOLD DOSE X1 AND WILL DECREASE TWD  2. Next INR: 1 week      Education provided to patient during the visit:  Patient instructed to call in interim with questions, concerns and changes.   Patient educated on interactions between medications and warfarin.   Patient educated on dietary consistency in vitamin k consumption.   Patient educated on signs of bleeding/clotting.   Patient educated on compliance with dosing, follow up appointments, and prescribed plan of care.

## 2025-04-08 DIAGNOSIS — R41.3 MEMORY LOSS: ICD-10-CM

## 2025-04-08 PROCEDURE — RXMED WILLOW AMBULATORY MEDICATION CHARGE

## 2025-04-08 RX ORDER — DONEPEZIL HYDROCHLORIDE 10 MG/1
TABLET, FILM COATED ORAL
Qty: 90 TABLET | Refills: 3 | Status: SHIPPED | OUTPATIENT
Start: 2025-04-08

## 2025-04-10 LAB
ALBUMIN SERPL-MCNC: 4.4 G/DL (ref 3.6–5.1)
ALBUMIN/CREAT UR: 3 MG/G CREAT
APPEARANCE UR: CLEAR
BILIRUB UR QL STRIP: NEGATIVE
BUN SERPL-MCNC: 24 MG/DL (ref 7–25)
BUN/CREAT SERPL: 13 (CALC) (ref 6–22)
CALCIUM SERPL-MCNC: 9.4 MG/DL (ref 8.6–10.3)
CHLORIDE SERPL-SCNC: 104 MMOL/L (ref 98–110)
CO2 SERPL-SCNC: 22 MMOL/L (ref 20–32)
COLOR UR: YELLOW
CREAT SERPL-MCNC: 1.87 MG/DL (ref 0.7–1.28)
CREAT UR-MCNC: 139 MG/DL (ref 20–320)
EGFRCR SERPLBLD CKD-EPI 2021: 37 ML/MIN/1.73M2
GLUCOSE SERPL-MCNC: 106 MG/DL (ref 65–99)
GLUCOSE UR QL STRIP: ABNORMAL
HGB UR QL STRIP: NEGATIVE
KETONES UR QL STRIP: NEGATIVE
LEUKOCYTE ESTERASE UR QL STRIP: NEGATIVE
MICROALBUMIN UR-MCNC: 0.4 MG/DL
NITRITE UR QL STRIP: NEGATIVE
PH UR STRIP: 5.5 [PH] (ref 5–8)
PHOSPHATE SERPL-MCNC: 3.6 MG/DL (ref 2.1–4.3)
POTASSIUM SERPL-SCNC: 4.3 MMOL/L (ref 3.5–5.3)
PROT UR QL STRIP: NEGATIVE
SODIUM SERPL-SCNC: 139 MMOL/L (ref 135–146)
SP GR UR STRIP: 1.02 (ref 1–1.03)
URATE SERPL-MCNC: 6.5 MG/DL (ref 4–8)

## 2025-04-11 ENCOUNTER — PHARMACY VISIT (OUTPATIENT)
Dept: PHARMACY | Facility: CLINIC | Age: 75
End: 2025-04-11
Payer: COMMERCIAL

## 2025-04-14 ENCOUNTER — ANTICOAGULATION - WARFARIN VISIT (OUTPATIENT)
Dept: CARDIOLOGY | Facility: CLINIC | Age: 75
End: 2025-04-14
Payer: MEDICARE

## 2025-04-14 DIAGNOSIS — I48.91 ATRIAL FIBRILLATION, UNSPECIFIED TYPE (MULTI): Primary | ICD-10-CM

## 2025-04-14 DIAGNOSIS — Z79.01 LONG TERM (CURRENT) USE OF ANTICOAGULANTS: ICD-10-CM

## 2025-04-14 DIAGNOSIS — I48.92 ATRIAL FLUTTER, UNSPECIFIED TYPE (MULTI): ICD-10-CM

## 2025-04-14 LAB
POC INR: 1.9
POC PROTHROMBIN TIME: NORMAL

## 2025-04-14 PROCEDURE — 85610 PROTHROMBIN TIME: CPT | Mod: QW

## 2025-04-14 PROCEDURE — 99211 OFF/OP EST MAY X REQ PHY/QHP: CPT

## 2025-04-14 NOTE — PROGRESS NOTES
Patient identification verified with 2 identifiers.    Location: [facility] - [address] [address] [address] [facility]. [place], [place] [address] [phone]     Referring Physician: Dr. Manjinder Francis   Enrollment/ Re-enrollment date: 26  INR Goal: 2.0-3.0  INR monitoring is per [facility] protocol.  Anticoagulation Medication: warfarin  Indication: Atrial Fibrillation/Atrial Flutter    Subjective   Bleeding signs/symptoms: No  Bruising: No   Major bleeding event: No  Thrombosis signs/symptoms: No  Thromboembolic event: No  Missed doses: No  Extra doses: No  Medication changes: No  Dietary changes: No  Change in health: No  Change in activity: No  Alcohol: No  Other concerns: No    Upcoming Procedures:  Does the Patient Have any upcoming procedures that require interruption in anticoagulation therapy? no  Does the patient require bridging? no      Anticoagulation Summary  As of 2025      INR goal:  2.0-3.0   TTR:  61.5% (1.5 y)   INR used for dosin.90 (2025)   Weekly warfarin total:  50 mg               Assessment/Plan   Subtherapeutic     1. New dose: no change    2. Next INR: 1 week      Education provided to patient during the visit:  Patient instructed to call in interim with questions, concerns and changes.   Patient educated on interactions between medications and warfarin.   Patient educated on dietary consistency in vitamin k consumption.   Patient educated on affects of alcohol consumption while taking warfarin.   Patient educated on signs of bleeding/clotting.   Patient educated on compliance with dosing, follow up appointments, and prescribed plan of care.

## 2025-04-18 DIAGNOSIS — I10 BENIGN ESSENTIAL HYPERTENSION: ICD-10-CM

## 2025-04-18 RX ORDER — TRIAMTERENE AND HYDROCHLOROTHIAZIDE 75; 50 MG/1; MG/1
1 TABLET ORAL DAILY
Qty: 100 TABLET | Refills: 3 | Status: SHIPPED | OUTPATIENT
Start: 2025-04-18

## 2025-04-21 ENCOUNTER — ANTICOAGULATION - WARFARIN VISIT (OUTPATIENT)
Dept: CARDIOLOGY | Facility: CLINIC | Age: 75
End: 2025-04-21
Payer: MEDICARE

## 2025-04-21 DIAGNOSIS — I48.91 ATRIAL FIBRILLATION, UNSPECIFIED TYPE (MULTI): Primary | ICD-10-CM

## 2025-04-21 DIAGNOSIS — Z79.01 LONG TERM (CURRENT) USE OF ANTICOAGULANTS: ICD-10-CM

## 2025-04-21 DIAGNOSIS — I48.92 ATRIAL FLUTTER, UNSPECIFIED TYPE (MULTI): ICD-10-CM

## 2025-04-21 LAB
POC INR: 1.7 (ref 0.9–1.1)
POC PROTHROMBIN TIME: ABNORMAL (ref 9.3–12.5)

## 2025-04-21 PROCEDURE — 85610 PROTHROMBIN TIME: CPT | Mod: QW

## 2025-04-21 PROCEDURE — 99211 OFF/OP EST MAY X REQ PHY/QHP: CPT

## 2025-04-21 NOTE — PROGRESS NOTES
Patient identification verified with 2 identifiers.    Location: [facility] - [address] [address] [place], [place] [address] [phone]     Referring Physician: Dr. Francis   Enrollment/ Re-enrollment date: 26  INR Goal: 2.0-3.0  INR monitoring is per [facility] protocol.  Anticoagulation Medication: warfarin  Indication: Atrial Fibrillation/Atrial Flutter    Subjective   Bleeding signs/symptoms: No    Bruising: No   Major bleeding event: No  Thrombosis signs/symptoms: No  Thromboembolic event: No  Missed doses: No  Extra doses: No  Medication changes: No  Dietary changes: No  Change in health: No  Change in activity: No  Alcohol: No  Other concerns: No    Upcoming Procedures:  Does the Patient Have any upcoming procedures that require interruption in anticoagulation therapy? no  Does the patient require bridging? no      Anticoagulation Summary  As of 2025      INR goal:  2.0-3.0   TTR:  60.7% (1.6 y)   INR used for dosin.70 (2025)   Weekly warfarin total:  55 mg               Assessment/Plan   Sub-Therapeutic     1. New dose: increase weekly dose  2. Next INR: 1 week      Education provided to patient during the visit:  Patient instructed to call in interim with questions, concerns and changes.   Patient educated on interactions between medications and warfarin.   Patient educated on dietary consistency in vitamin k consumption.   Patient educated on signs of bleeding/clotting.   Patient educated on compliance with dosing, follow up appointments, and prescribed plan of care.

## 2025-04-28 ENCOUNTER — APPOINTMENT (OUTPATIENT)
Dept: CARDIOLOGY | Facility: CLINIC | Age: 75
End: 2025-04-28
Payer: MEDICARE

## 2025-04-28 DIAGNOSIS — I48.91 ATRIAL FIBRILLATION, UNSPECIFIED TYPE (MULTI): Primary | ICD-10-CM

## 2025-04-28 DIAGNOSIS — I48.92 ATRIAL FLUTTER, UNSPECIFIED TYPE (MULTI): ICD-10-CM

## 2025-04-28 DIAGNOSIS — Z79.01 LONG TERM (CURRENT) USE OF ANTICOAGULANTS: ICD-10-CM

## 2025-04-28 LAB
POC INR: 2.4 (ref 2–3)
POC PROTHROMBIN TIME: ABNORMAL (ref 9.3–12.5)

## 2025-04-28 PROCEDURE — 99211 OFF/OP EST MAY X REQ PHY/QHP: CPT

## 2025-04-28 PROCEDURE — 85610 PROTHROMBIN TIME: CPT | Mod: QW

## 2025-04-28 NOTE — PROGRESS NOTES
Patient identification verified with 2 identifiers.    Location: [facility] - [address] [address] [place], [place] [address] [phone]     Referring Physician: Dr. Manjinder Francis   Enrollment/ Re-enrollment date: 26  INR Goal: 2.0-3.0  INR monitoring is per [facility] protocol.  Anticoagulation Medication: warfarin  Indication: Atrial Fibrillation/Atrial Flutter    Subjective   Bleeding signs/symptoms: No  Bruising: No   Major bleeding event: No  Thrombosis signs/symptoms: No  Thromboembolic event: No  Missed doses: No  Extra doses: No  Medication changes: No  Dietary changes: No  Change in health: No  Change in activity: No  Alcohol: No  Other concerns: No    Upcoming Procedures:  Does the Patient Have any upcoming procedures that require interruption in anticoagulation therapy? no  Does the patient require bridging? no      Anticoagulation Summary  As of 2025      INR goal:  2.0-3.0   TTR:  60.7% (1.6 y)   INR used for dosin.40 (2025)   Weekly warfarin total:  55 mg               Assessment/Plan   Therapeutic     1. New dose: no change    2. Next INR: 1 week      Education provided to patient during the visit:  Patient instructed to call in interim with questions, concerns and changes.   Patient educated on interactions between medications and warfarin.   Patient educated on dietary consistency in vitamin k consumption.   Patient educated on affects of alcohol consumption while taking warfarin.   Patient educated on signs of bleeding/clotting.   Patient educated on compliance with dosing, follow up appointments, and prescribed plan of care.

## 2025-04-29 ENCOUNTER — TELEMEDICINE (OUTPATIENT)
Dept: PHARMACY | Facility: HOSPITAL | Age: 75
End: 2025-04-29
Payer: MEDICARE

## 2025-04-29 ENCOUNTER — APPOINTMENT (OUTPATIENT)
Dept: PRIMARY CARE | Facility: CLINIC | Age: 75
End: 2025-04-29
Payer: MEDICARE

## 2025-04-29 VITALS
HEART RATE: 72 BPM | DIASTOLIC BLOOD PRESSURE: 78 MMHG | WEIGHT: 259 LBS | BODY MASS INDEX: 38.81 KG/M2 | SYSTOLIC BLOOD PRESSURE: 120 MMHG

## 2025-04-29 DIAGNOSIS — F03.A0 MILD DEMENTIA, UNSPECIFIED DEMENTIA TYPE, UNSPECIFIED WHETHER BEHAVIORAL, PSYCHOTIC, OR MOOD DISTURBANCE OR ANXIETY: ICD-10-CM

## 2025-04-29 DIAGNOSIS — E11.9 TYPE 2 DIABETES MELLITUS WITHOUT COMPLICATION, WITHOUT LONG-TERM CURRENT USE OF INSULIN: Primary | ICD-10-CM

## 2025-04-29 DIAGNOSIS — I10 BENIGN ESSENTIAL HYPERTENSION: ICD-10-CM

## 2025-04-29 DIAGNOSIS — E66.01 CLASS 2 SEVERE OBESITY DUE TO EXCESS CALORIES WITH SERIOUS COMORBIDITY AND BODY MASS INDEX (BMI) OF 38.0 TO 38.9 IN ADULT: ICD-10-CM

## 2025-04-29 DIAGNOSIS — E11.69 TYPE 2 DIABETES MELLITUS WITH OTHER SPECIFIED COMPLICATION, WITHOUT LONG-TERM CURRENT USE OF INSULIN: ICD-10-CM

## 2025-04-29 DIAGNOSIS — M05.9 RHEUMATOID ARTHRITIS WITH RHEUMATOID FACTOR, UNSPECIFIED: ICD-10-CM

## 2025-04-29 DIAGNOSIS — E11.69 TYPE 2 DIABETES MELLITUS WITH OTHER SPECIFIED COMPLICATION, WITHOUT LONG-TERM CURRENT USE OF INSULIN: Primary | ICD-10-CM

## 2025-04-29 DIAGNOSIS — E66.812 CLASS 2 SEVERE OBESITY DUE TO EXCESS CALORIES WITH SERIOUS COMORBIDITY AND BODY MASS INDEX (BMI) OF 38.0 TO 38.9 IN ADULT: ICD-10-CM

## 2025-04-29 DIAGNOSIS — C61 PROSTATE CANCER (MULTI): ICD-10-CM

## 2025-04-29 DIAGNOSIS — I48.91 ATRIAL FIBRILLATION, UNSPECIFIED TYPE (MULTI): ICD-10-CM

## 2025-04-29 DIAGNOSIS — I42.9 CARDIOMYOPATHY, UNSPECIFIED TYPE (MULTI): ICD-10-CM

## 2025-04-29 LAB
POC FINGERSTICK BLOOD GLUCOSE: 106 MG/DL (ref 70–100)
POC HEMOGLOBIN A1C: 7.2 % (ref 4.2–6.5)

## 2025-04-29 PROCEDURE — 4010F ACE/ARB THERAPY RXD/TAKEN: CPT | Performed by: INTERNAL MEDICINE

## 2025-04-29 PROCEDURE — 1158F ADVNC CARE PLAN TLK DOCD: CPT | Performed by: INTERNAL MEDICINE

## 2025-04-29 PROCEDURE — G2211 COMPLEX E/M VISIT ADD ON: HCPCS | Performed by: INTERNAL MEDICINE

## 2025-04-29 PROCEDURE — 83036 HEMOGLOBIN GLYCOSYLATED A1C: CPT | Performed by: INTERNAL MEDICINE

## 2025-04-29 PROCEDURE — 82962 GLUCOSE BLOOD TEST: CPT | Performed by: INTERNAL MEDICINE

## 2025-04-29 PROCEDURE — 3051F HG A1C>EQUAL 7.0%<8.0%: CPT | Performed by: INTERNAL MEDICINE

## 2025-04-29 PROCEDURE — 1123F ACP DISCUSS/DSCN MKR DOCD: CPT | Performed by: INTERNAL MEDICINE

## 2025-04-29 PROCEDURE — 1160F RVW MEDS BY RX/DR IN RCRD: CPT | Performed by: INTERNAL MEDICINE

## 2025-04-29 PROCEDURE — 3074F SYST BP LT 130 MM HG: CPT | Performed by: INTERNAL MEDICINE

## 2025-04-29 PROCEDURE — 1157F ADVNC CARE PLAN IN RCRD: CPT | Performed by: INTERNAL MEDICINE

## 2025-04-29 PROCEDURE — 1159F MED LIST DOCD IN RCRD: CPT | Performed by: INTERNAL MEDICINE

## 2025-04-29 PROCEDURE — 99214 OFFICE O/P EST MOD 30 MIN: CPT | Performed by: INTERNAL MEDICINE

## 2025-04-29 PROCEDURE — 3078F DIAST BP <80 MM HG: CPT | Performed by: INTERNAL MEDICINE

## 2025-04-29 ASSESSMENT — ENCOUNTER SYMPTOMS
STRIDOR: 0
FACIAL SWELLING: 0
ABDOMINAL DISTENTION: 0
DYSURIA: 0
FATIGUE: 0
MYALGIAS: 0
ARTHRALGIAS: 0
SORE THROAT: 0
WOUND: 0
FREQUENCY: 0
SLEEP DISTURBANCE: 0
ADENOPATHY: 0
PALPITATIONS: 0
EYE PAIN: 0
SINUS PAIN: 0
NUMBNESS: 0
POLYDIPSIA: 0
NAUSEA: 0
HEADACHES: 0
COUGH: 0
NECK STIFFNESS: 0
HEMATURIA: 0
JOINT SWELLING: 0
CHOKING: 0
EYE REDNESS: 0
WEAKNESS: 0
DIFFICULTY URINATING: 0
DIZZINESS: 0
RHINORRHEA: 0
APPETITE CHANGE: 0
FLANK PAIN: 0
TREMORS: 0
EYE DISCHARGE: 0
LIGHT-HEADEDNESS: 0
COLOR CHANGE: 0
PHOTOPHOBIA: 0
ABDOMINAL PAIN: 0
CHEST TIGHTNESS: 0
BLOOD IN STOOL: 0
WHEEZING: 0
RECTAL PAIN: 0
SEIZURES: 0
ACTIVITY CHANGE: 0
VOICE CHANGE: 0
EYE ITCHING: 0
NECK PAIN: 0
SPEECH DIFFICULTY: 0
CHILLS: 0
BACK PAIN: 0
VOMITING: 0
POLYPHAGIA: 0
DIAPHORESIS: 0
BRUISES/BLEEDS EASILY: 0
SHORTNESS OF BREATH: 0
SINUS PRESSURE: 0
DIARRHEA: 0
TROUBLE SWALLOWING: 0
FACIAL ASYMMETRY: 0
CONSTIPATION: 0
ANAL BLEEDING: 0

## 2025-04-29 NOTE — PROGRESS NOTES
Patient is sent at the request of Manjinder Francis MD for my opinion regarding Type 2 diabetes.  My final recommendations will be communicated back to the requesting provider by way of shared medical record.    Subjective     Patient is a 74 year old male presenting for diabetes management. He has memory loss and his son, Javier manages his medications. Currently on Farxiga 10 mg daily and Ozempic 1 mg weekly injection.  Approved for  PAP through November 14, 2025. His son, Javier also uses Ozempic and manages patient's medications.      PAP through 11/14/2025      Past Medical History:  He has no past medical history on file.    Past Surgical History:  He has a past surgical history that includes Colonoscopy (12/05/2013); Hernia repair (12/06/2013); Colonoscopy (11/07/2014); Ablation of dysrhythmic focus (05/22/2017); and Colonoscopy (01/2021).    Social History:  He reports that he has quit smoking. His smoking use included cigarettes. He has a 15 pack-year smoking history. He has never used smokeless tobacco. He reports current alcohol use of about 5.0 standard drinks of alcohol per week. He reports that he does not use drugs.    Family History:  Family History   Problem Relation Name Age of Onset    Cancer Mother      Sleep apnea Brother         Allergies:  Iodinated contrast media  DIABETES MELLITUS TYPE 2:    Does patient follow with Endocrinology: No  Last optometry exam: 3/5/25  Most recent visit in Podiatry: every 2 months, next appt in March    Current diabetic medications include:  Farxiga 10 mg daily   Ozempic 1 mg weekly    Past diabetic medications include:  Insulin - long acting. Took about 3 years ago.     Glucose Readings:  Glucometer/CGM Type: One touch ultra meter   4/4: 104 mg/dL  4/22: 96 mg/dL  4/26: 98 mg/dL  Highest reading in the past month: 105 mg/dL  Lowest reading in the past month: 83 mg/dL    Previous readings:  3/10: 96 mg/dL   3/11: 104 mg/dL   3/12: 104 mg/dL   3/13: 101 mg/dL      Any episodes of hypoglycemia? none    Lifestyle:  Diet: Son tries to cook at home 3 meals/day. About 3 times a month they go out to eat. Tries to limit carbs.   No changes in appetite yet  BK: oatmeal   LN: hotdog  Snacks: does not snack a lot   Drinks: Diet pepsi and a lot of water and chocolate milk.   Physical Activity: Walks around stores 3 times a week 1-2 hours. Gets winded walking a lot.   Walking on the weekends 2-3 times a week 30-90 minutes   Weight  Current: 259 lbs   Baseline: 262 lbs    Secondary Prevention:  Statin? Yes, Atorvastatin 80 mg   ACE-I/ARB? Yes, Lisinopril 40 mg   Aspirin? No    Pertinent PMH Review:  PMH of Pancreatitis: No  PMH of Retinopathy: No  PMH of Urinary Tract Infections: No, patient denies any symptoms of UTI  PMH of MTC: No    Drug Interactions:  none    Objective     Last Recorded Vitals:  BP Readings from Last 6 Encounters:   03/14/25 119/72   01/28/25 128/78   10/29/24 114/76   09/10/24 145/88   07/30/24 140/86   04/09/24 105/67        Wt Readings from Last 6 Encounters:   04/29/25 117 kg (259 lb)   03/14/25 119 kg (262 lb 5.6 oz)   01/28/25 119 kg (262 lb)   10/29/24 119 kg (262 lb)   09/10/24 104 kg (230 lb 6.1 oz)   07/30/24 116 kg (255 lb)       BMI Readings from Last 6 Encounters:   04/29/25 38.81 kg/m²   03/14/25 39.31 kg/m²   01/28/25 39.26 kg/m²   10/29/24 39.26 kg/m²   09/10/24 34.52 kg/m²   07/30/24 38.77 kg/m²       Lab Review  Lab Results   Component Value Date    BILITOT 0.7 07/30/2024    CALCIUM 9.4 04/09/2025    CO2 22 04/09/2025     04/09/2025    CREATININE 1.87 (H) 04/09/2025    GLUCOSE 106 (H) 04/09/2025    ALKPHOS 80 07/30/2024    K 4.3 04/09/2025    PROT 7.2 07/30/2024     04/09/2025    AST 17 07/30/2024    ALT 19 07/30/2024    BUN 24 04/09/2025    ANIONGAP 15 10/29/2024    MG 2.02 01/09/2021    PHOS 3.6 04/09/2025    ALBUMIN 4.4 04/09/2025    GFRMALE 36 (A) 08/02/2023     Lab Results   Component Value Date    TRIG 89 07/30/2024    CHOL 134  "07/30/2024    LDLCALC 69 07/30/2024    HDL 47.1 07/30/2024     Lab Results   Component Value Date    HGBA1C 9.2 (A) 01/28/2025    HGBA1C 7.8 (A) 10/29/2024    HGBA1C 7.1 (A) 07/30/2024     No components found for: \"UACR\"  The ASCVD Risk score (Bonilla DEGROOT, et al., 2019) failed to calculate for the following reasons:    Risk score cannot be calculated because patient has a medical history suggesting prior/existing ASCVD      PATIENT EDUCATION/DISCUSSION:  - Counseled patient on Farxiga MOA, expectations, side effects, duration of therapy, contraindications, administration, and monitoring parameters  - Answered all patient questions and concerns      Ozempic Education:     - Counseled patient on Ozempic MOA, expectations, side effects, duration of therapy, administration, and monitoring parameters.  - Provided detailed dosing and administration counseling to ensure proper technique.   - Reviewed Ozempic titration schedule, starting with 0.25 mg once weekly for 4 weeks, then continuing on 0.5 mg once weekly. Pt verbalized understanding.  - Counseled patient on the benefits of GLP-1ra, such as cardiovascular risk reduction, glycemic control, and weight loss potential.  - Reviewed storage requirements of Ozempic when not in use, and when to administer the medication if a dose is missed.  - Advised patient that they may experience improved satiety after meals and portion sizes of meals may be reduced as doses of Ozempic increase.  - Counseled patient to avoid foods that are fatty/oily as this may precipitate the nausea/GI upset that may occur with new start Ozempic.     Assessment/Plan   Problem List Items Addressed This Visit    None      Assessment     - A1c has significantly improved from 9.2% (1/28/25) to 7.2% (4/29/25)  - Patient has been tolerating Ozempic 1 mg without any side effects.   - Since dose increase, the highest fasting BG was 105 mg/dL and the lowest was 83 mg/dL.  - Denies hypoglycemic symptoms.   - Has " increased weekly physical activity by walking more often with the weather being warmer  - Son states he has 3 more doses of the 1 mg left and is continuing to see weight loss which he plans to monitor closely.   - Will continue Ozempic 1 mg due to continued weight loss and follow up to reassess in 3 weeks    Plan     CONTINUE Farxiga 10 mg once daily   Provided pharmacist's phone number ([phone]) for any questions prior to follow up.   CONTINUE Ozempic to 1 mg once weekly injection   CONTINUE making positive lifestyle modifications  Monitor fasting blood sugars in the morning and keep a record of them.       Follow up with Clinical Pharmacy Team 5/20/25 at 1:40 pm     Time spent with pt: Total length of time 10 (minutes) of the encounter and more than 50% was spent counseling the patient.    Continue all meds under the continuation of care with the referring provider and clinical pharmacy team.    Please reach out to the Clinical Pharmacy Team if there are any further questions.     Verbal consent to manage patient's drug therapy was obtained from patient. They were informed they may decline to participate or withdraw from participation in pharmacy services at any time.    Cornelia Castañeda, PharmD  PGY-1 Pharmacy Resident  [phone]

## 2025-04-29 NOTE — PROGRESS NOTES
I reviewed the progress note and agree with the resident’s findings and plans as written. Case discussed with resident.    Luanne Florian, PharmD  Clinical Pharmacy Specialist   [phone]

## 2025-04-29 NOTE — PROGRESS NOTES
Subjective   Patient ID: Elver Mc is a 74 y.o. male who presents for Follow-up.    Patient presents for follow-up.  He has been compliant with his medications, diet but not exercise.  His family states that his memory loss is at baseline.  He overall feels well.  He denies any headaches, no dizziness, no chest pain or shortness of breath.  He denies abdominal pain no nausea vomiting or diarrhea.  He reports no pain.         Review of Systems   Constitutional:  Negative for activity change, appetite change, chills, diaphoresis and fatigue.   HENT:  Negative for congestion, dental problem, drooling, ear discharge, ear pain, facial swelling, hearing loss, mouth sores, nosebleeds, postnasal drip, rhinorrhea, sinus pressure, sinus pain, sneezing, sore throat, tinnitus, trouble swallowing and voice change.    Eyes:  Negative for photophobia, pain, discharge, redness, itching and visual disturbance.   Respiratory:  Negative for cough, choking, chest tightness, shortness of breath, wheezing and stridor.    Cardiovascular:  Negative for chest pain, palpitations and leg swelling.   Gastrointestinal:  Negative for abdominal distention, abdominal pain, anal bleeding, blood in stool, constipation, diarrhea, nausea, rectal pain and vomiting.   Endocrine: Negative for cold intolerance, heat intolerance, polydipsia, polyphagia and polyuria.   Genitourinary:  Negative for decreased urine volume, difficulty urinating, dysuria, enuresis, flank pain, frequency, genital sores, hematuria and urgency.   Musculoskeletal:  Negative for arthralgias, back pain, gait problem, joint swelling, myalgias, neck pain and neck stiffness.   Skin:  Negative for color change, pallor, rash and wound.   Neurological:  Negative for dizziness, tremors, seizures, syncope, facial asymmetry, speech difficulty, weakness, light-headedness, numbness and headaches.   Hematological:  Negative for adenopathy. Does not bruise/bleed easily.    Psychiatric/Behavioral:  Negative for sleep disturbance.        Objective   /78   Pulse 72   Wt 117 kg (259 lb)   BMI 38.81 kg/m²     Physical Exam  Constitutional:       Appearance: Normal appearance.   Cardiovascular:      Rate and Rhythm: Normal rate and regular rhythm.      Heart sounds: No murmur heard.     No gallop.   Pulmonary:      Effort: No respiratory distress.      Breath sounds: No wheezing or rales.   Abdominal:      General: There is no distension.      Palpations: There is no mass.      Tenderness: There is no abdominal tenderness. There is no guarding.   Musculoskeletal:      Right lower leg: No edema.      Left lower leg: No edema.   Neurological:      Mental Status: He is alert.         Assessment/Plan   Diagnoses and all orders for this visit:  Type 2 diabetes mellitus with other specified complication, without long-term current use of insulin-hemoglobin A1c 7.2.  Improved.  Will continue present management.  Otherwise appointment has been  -     POCT glycosylated hemoglobin (Hb A1C) manually resulted  -     POCT Fingerstick Glucose manually resulted  Rheumatoid arthritis with rheumatoid factor, unspecified-he denies any symptoms  Atrial fibrillation, unspecified type (Multi)-rate is controlled.  Will continue with anticoagulation  Benign essential hypertension-low-salt diet and exercise  Cardiomyopathy, unspecified type (Multi)-follow-up with cardiology  Class 2 severe obesity due to excess calories with serious comorbidity and body mass index (BMI) of 38.0 to 38.9 in adult-is encouraged to diet and exercise  Prostate cancer (Multi)-scheduled follow-up with urology  Mild dementia, unspecified dementia type, unspecified whether behavioral, psychotic, or mood disturbance or anxiety-follow-up with neurology.  Obesity-he will diet and exercise.  Health maintenance-colonoscopy has been done.  He will get a COVID-vaccine at the pharmacy.  Eye and dental have been done.  Advance care  planning discussed  Renal insufficiency-he will follow-up with nephrology as scheduled

## 2025-05-05 ENCOUNTER — ANTICOAGULATION - WARFARIN VISIT (OUTPATIENT)
Dept: CARDIOLOGY | Facility: CLINIC | Age: 75
End: 2025-05-05
Payer: MEDICARE

## 2025-05-05 DIAGNOSIS — I48.91 ATRIAL FIBRILLATION, UNSPECIFIED TYPE (MULTI): Primary | ICD-10-CM

## 2025-05-05 DIAGNOSIS — Z79.01 LONG TERM (CURRENT) USE OF ANTICOAGULANTS: ICD-10-CM

## 2025-05-05 DIAGNOSIS — I48.92 ATRIAL FLUTTER, UNSPECIFIED TYPE (MULTI): ICD-10-CM

## 2025-05-05 LAB
POC INR: 2.4 (ref 0.9–1.1)
POC PROTHROMBIN TIME: ABNORMAL (ref 9.3–12.5)

## 2025-05-05 PROCEDURE — 85610 PROTHROMBIN TIME: CPT | Mod: QW

## 2025-05-05 PROCEDURE — 99211 OFF/OP EST MAY X REQ PHY/QHP: CPT

## 2025-05-05 NOTE — PROGRESS NOTES
Patient identification verified with 2 identifiers.    Location: [facility] - [address] [address] [place], [place] [address] [phone]     Referring Physician: Dr. Manjinder Francis   Enrollment/ Re-enrollment date: 26  INR Goal: 2.0-3.0  INR monitoring is per [facility] protocol.  Anticoagulation Medication: warfarin  Indication: Atrial Fibrillation/Atrial Flutter  Subjective   Bleeding signs/symptoms: No    Bruising: No   Major bleeding event: No  Thrombosis signs/symptoms: No  Thromboembolic event: No  Missed doses: No  Extra doses: No  Medication changes: No  Dietary changes: No  Change in health: No  Change in activity: No  Alcohol: No  Other concerns: No    Upcoming Procedures:  Does the Patient Have any upcoming procedures that require interruption in anticoagulation therapy? no  Does the patient require bridging? no      Anticoagulation Summary  As of 2025      INR goal:  2.0-3.0   TTR:  61.2% (1.6 y)   INR used for dosin.40 (2025)   Weekly warfarin total:  55 mg               Assessment/Plan   Therapeutic     1. New dose: no change    2. Next INR: 2 weeks      Education provided to patient during the visit:  Patient instructed to call in interim with questions, concerns and changes.   Patient educated on compliance with dosing, follow up appointments, and prescribed plan of care.

## 2025-05-06 DIAGNOSIS — E11.69 TYPE 2 DIABETES MELLITUS WITH OTHER SPECIFIED COMPLICATION, WITHOUT LONG-TERM CURRENT USE OF INSULIN: ICD-10-CM

## 2025-05-06 PROCEDURE — RXMED WILLOW AMBULATORY MEDICATION CHARGE

## 2025-05-07 PROCEDURE — RXMED WILLOW AMBULATORY MEDICATION CHARGE

## 2025-05-07 RX ORDER — SEMAGLUTIDE 1.34 MG/ML
1 INJECTION, SOLUTION SUBCUTANEOUS WEEKLY
Qty: 3 ML | Refills: 0 | Status: SHIPPED | OUTPATIENT
Start: 2025-05-07

## 2025-05-09 ENCOUNTER — PHARMACY VISIT (OUTPATIENT)
Dept: PHARMACY | Facility: CLINIC | Age: 75
End: 2025-05-09
Payer: COMMERCIAL

## 2025-05-12 ENCOUNTER — PHARMACY VISIT (OUTPATIENT)
Dept: PHARMACY | Facility: CLINIC | Age: 75
End: 2025-05-12
Payer: COMMERCIAL

## 2025-05-19 ENCOUNTER — ANTICOAGULATION - WARFARIN VISIT (OUTPATIENT)
Dept: CARDIOLOGY | Facility: CLINIC | Age: 75
End: 2025-05-19
Payer: MEDICARE

## 2025-05-19 DIAGNOSIS — I48.92 ATRIAL FLUTTER, UNSPECIFIED TYPE (MULTI): ICD-10-CM

## 2025-05-19 DIAGNOSIS — Z79.01 LONG TERM (CURRENT) USE OF ANTICOAGULANTS: ICD-10-CM

## 2025-05-19 DIAGNOSIS — I48.91 ATRIAL FIBRILLATION, UNSPECIFIED TYPE (MULTI): Primary | ICD-10-CM

## 2025-05-19 LAB
POC INR: 3.4 (ref 0.9–1.1)
POC PROTHROMBIN TIME: ABNORMAL (ref 9.3–12.5)

## 2025-05-19 PROCEDURE — 85610 PROTHROMBIN TIME: CPT | Mod: QW

## 2025-05-19 PROCEDURE — 99211 OFF/OP EST MAY X REQ PHY/QHP: CPT

## 2025-05-19 NOTE — PROGRESS NOTES
Patient identification verified with 2 identifiers.    Location: [facility] - [address] [address] [place], [place] [address] [phone]     Referring Physician: Dr. Manjinder Francis   Enrollment/ Re-enrollment date: 2/24/26  INR Goal: 2.0-3.0  INR monitoring is per [facility] protocol.  Anticoagulation Medication: warfarin  Indication: Atrial Fibrillation/Atrial Flutter    Subjective   Bleeding signs/symptoms: No    Bruising: No   Major bleeding event: No  Thrombosis signs/symptoms: No  Thromboembolic event: No  Missed doses: No  Extra doses: No  Medication changes: No  Dietary changes: No  Change in health: No  Change in activity: No  Alcohol: No  Other concerns: No    Upcoming Procedures:  Does the Patient Have any upcoming procedures that require interruption in anticoagulation therapy? no  Does the patient require bridging? no      Anticoagulation Summary  As of 5/19/2025      INR goal:  2.0-3.0   TTR:  61.1% (1.6 y)   INR used for dosing:  3.40 (5/19/2025)   Weekly warfarin total:  50 mg               Assessment/Plan   Supratherapeutic     1. New dose:WILL DECREASE TWD PER PROTOCOL  2. Next INR: 1 week      Education provided to patient during the visit:  Patient instructed to call in interim with questions, concerns and changes.   Patient educated on interactions between medications and warfarin.   Patient educated on compliance with dosing, follow up appointments, and prescribed plan of care.

## 2025-05-20 ENCOUNTER — APPOINTMENT (OUTPATIENT)
Dept: PHARMACY | Facility: HOSPITAL | Age: 75
End: 2025-05-20
Payer: MEDICARE

## 2025-05-20 DIAGNOSIS — E11.69 TYPE 2 DIABETES MELLITUS WITH OTHER SPECIFIED COMPLICATION, WITHOUT LONG-TERM CURRENT USE OF INSULIN: ICD-10-CM

## 2025-05-20 DIAGNOSIS — E11.9 TYPE 2 DIABETES MELLITUS WITHOUT COMPLICATION, WITHOUT LONG-TERM CURRENT USE OF INSULIN: ICD-10-CM

## 2025-05-20 NOTE — PROGRESS NOTES
I reviewed the progress note and agree with the resident’s findings and plans as written. Case discussed with resident.    Luanne Florian, PharmD  Clinical Pharmacy Specialist   [phone]

## 2025-05-27 NOTE — PROGRESS NOTES
"    ADULT AUDIOMETRIC EVALUATION      Name:  Elver Mc  :  1950  Age:  74 y.o.  Date of Evaluation:  ***    IMPRESSIONS     Today's test results are consistent with ***. Results are consistent with most recent hearing evaluation performed on 2024. Discussed results and recommendations with patient.  Questions were addressed and the patient was encouraged to contact our department should concerns arise.    RECOMMENDATIONS     {plan:98284}  Continue medical follow up with PCP/ENT.  Return for annual hearing evaluation or sooner should concerns arise.    Time: ***    HISTORY     Elver Mc is seen today at the request of *** for an evaluation of hearing. Patient has a known history of bilateral SNHL. Most recent hearing evaluation revealed normal hearing sensitivity sloping to a moderate SNHL, bilaterally. Today, patient reported ***. Patient denied history of tinnitus, dizziness, aural fullness, or excessive noise exposure. No history of hearing aid use.    TEST RESULTS     Screening Measures: Risk screenings are completed annually or more frequently as designated upon arrival to an outpatient location  Steadi Fall Risk: One or more falls in the last year? No  Domestic Violence: Do you feel UNSAFE going back to the place you are living? No/Not Indicated  Pain: Are you in any pain (0-10)? 0    Otoscopic Evaluation:  Physical exam to evaluate the outer ear  Right Ear: {CERUMEN:92128}  Left Ear: {CERUMEN:01196}    Tympanometry & Acoustic Reflexes:  Assesses the function of the middle ear and inner ear structures  Right Ear: {TYMPSC:64931::\"Tympanometry revealed normal ear canal volume, peak pressure, and compliance (Type A).\"} {REFLEXSC:71950::\"Ipsilateral Acoustic Reflexes were present *** Hz.\"} {Retro (Optional):15056}  Left Ear: {TYMPSC:95431::\"Tympanometry revealed normal ear canal volume, peak pressure, and compliance (Type A).\"} {REFLEXSC:73284::\"Ipsilateral Acoustic Reflexes were present " "*** Hz.\"} {Retro (Optional):12512}    Distortion Product Otoacoustic Emissions: Assesses the cochlear outer hair cell function  Right Ear: {OAE:18995}  Left Ear:  {OAE:39463}    Pure Tone Audiometry: {METHODSC:82684} via {TRANSDUCERS:92971} with {RELIABLESC:79813} reliability  Right Ear: ***  Left Ear: ***    Speech Audiometry:   Right Ear: {AUD SRT/SAT:99906}. Word Recognition scores were {DESC; EXCELLENT/GOOD/FAIR:50775} (***%) in quiet when words were presented at *** dBHL.   Left Ear: {AUD SRT/SAT:64833}. Word Recognition scores were {DESC; EXCELLENT/GOOD/FAIR:52088} (***%) in quiet when words were presented at *** dBHL.   Binaural Speech in Noise testing: QuickSIN revealed *** degree of SNR loss.       Testing and interpretation of results completed by Wesley Dow CCC-A [facility]. It was my pleasure to evaluate this patient.       WESLEY Dow, CCC-A [facility]  Senior Clinical Vestibular Audiologist      Degree of Hearing Sensitivity Decibel Range   Within Normal Limits (WNL) 0-25   Mild 26-40   Moderate 41-55   Moderately-Severe 56-70   Severe 71-90   Profound 91+      Key   CNT/DNT Could Not Test/Did Not Test   TM Tympanic Membrane   WNL Within Normal Limits   HA Hearing Aid   SNHL Sensorineural Hearing Loss   CHL Conductive Hearing Loss   NIHL Noise-Induced Hearing Loss   ECV Ear Canal Volume   RE/LE Right Ear/Left Ear        AUDIOGRAM     "

## 2025-05-28 ENCOUNTER — ANTICOAGULATION - WARFARIN VISIT (OUTPATIENT)
Dept: CARDIOLOGY | Facility: CLINIC | Age: 75
End: 2025-05-28
Payer: MEDICARE

## 2025-05-28 DIAGNOSIS — Z79.01 LONG TERM (CURRENT) USE OF ANTICOAGULANTS: ICD-10-CM

## 2025-05-28 DIAGNOSIS — I48.92 ATRIAL FLUTTER, UNSPECIFIED TYPE (MULTI): ICD-10-CM

## 2025-05-28 DIAGNOSIS — I48.91 ATRIAL FIBRILLATION, UNSPECIFIED TYPE (MULTI): Primary | ICD-10-CM

## 2025-05-28 LAB
POC INR: 2.7 (ref 2–3)
POC PROTHROMBIN TIME: ABNORMAL (ref 9.3–12.5)

## 2025-05-28 PROCEDURE — 99211 OFF/OP EST MAY X REQ PHY/QHP: CPT

## 2025-05-28 PROCEDURE — 85610 PROTHROMBIN TIME: CPT | Mod: QW

## 2025-05-28 NOTE — PROGRESS NOTES
Patient identification verified with 2 identifiers.    Location: [facility] - [address] [address] [place], [place] [address] [phone]     Referring Physician: Dr. Manjinder Francis   Enrollment/ Re-enrollment date: 26  INR Goal: 2.0-3.0  INR monitoring is per [facility] protocol.  Anticoagulation Medication: warfarin  Indication: Atrial Fibrillation/Atrial Flutter  Subjective   Bleeding signs/symptoms: No    Bruising: No   Major bleeding event: No  Thrombosis signs/symptoms: No  Thromboembolic event: No  Missed doses: No  Extra doses: No  Medication changes: No  Dietary changes: No  Change in health: No  Change in activity: No  Alcohol: No  Other concerns: No    Upcoming Procedures:  Does the Patient Have any upcoming procedures that require interruption in anticoagulation therapy? no  Does the patient require bridging? no      Anticoagulation Summary  As of 2025      INR goal:  2.0-3.0   TTR:  60.9% (1.7 y)   INR used for dosin.70 (2025)   Weekly warfarin total:  50 mg               Assessment/Plan   Therapeutic     1. New dose: no change    2. Next INR: 1 week      Education provided to patient during the visit:  Patient instructed to call in interim with questions, concerns and changes.   Patient educated on compliance with dosing, follow up appointments, and prescribed plan of care.

## 2025-05-29 ENCOUNTER — APPOINTMENT (OUTPATIENT)
Dept: AUDIOLOGY | Facility: CLINIC | Age: 75
End: 2025-05-29
Payer: MEDICARE

## 2025-06-02 ENCOUNTER — ANTICOAGULATION - WARFARIN VISIT (OUTPATIENT)
Dept: CARDIOLOGY | Facility: CLINIC | Age: 75
End: 2025-06-02
Payer: MEDICARE

## 2025-06-02 DIAGNOSIS — I48.92 ATRIAL FLUTTER, UNSPECIFIED TYPE (MULTI): ICD-10-CM

## 2025-06-02 DIAGNOSIS — Z79.01 LONG TERM (CURRENT) USE OF ANTICOAGULANTS: ICD-10-CM

## 2025-06-02 DIAGNOSIS — I48.91 ATRIAL FIBRILLATION, UNSPECIFIED TYPE (MULTI): Primary | ICD-10-CM

## 2025-06-02 LAB
POC INR: 2.3 (ref 2–3)
POC PROTHROMBIN TIME: ABNORMAL (ref 9.3–12.5)

## 2025-06-02 PROCEDURE — 85610 PROTHROMBIN TIME: CPT | Mod: QW

## 2025-06-02 PROCEDURE — 99211 OFF/OP EST MAY X REQ PHY/QHP: CPT

## 2025-06-02 NOTE — PROGRESS NOTES
Patient identification verified with 2 identifiers.    Location: [facility] - [address] [address] [place], [place] [address] [phone]     Referring Physician: Dr. Manjinder Francis   Enrollment/ Re-enrollment date: 26  INR Goal: 2.0-3.0  INR monitoring is per [facility] protocol.  Anticoagulation Medication: warfarin  Indication: Atrial Fibrillation/Atrial Flutter  Subjective   Bleeding signs/symptoms: No    Bruising: No   Major bleeding event: No  Thrombosis signs/symptoms: No  Thromboembolic event: No  Missed doses: No  Extra doses: No  Medication changes: No  Dietary changes: No  Change in health: No  Change in activity: No  Alcohol: No  Other concerns: No    Upcoming Procedures:  Does the Patient Have any upcoming procedures that require interruption in anticoagulation therapy? no  Does the patient require bridging? no      Anticoagulation Summary  As of 2025      INR goal:  2.0-3.0   TTR:  61.2% (1.7 y)   INR used for dosin.30 (2025)   Weekly warfarin total:  50 mg               Assessment/Plan   Therapeutic     1. New dose: no change    2. Next INR: 2 weeks      Education provided to patient during the visit:  Patient instructed to call in interim with questions, concerns and changes.   Patient educated on compliance with dosing, follow up appointments, and prescribed plan of care.

## 2025-06-03 ENCOUNTER — CLINICAL SUPPORT (OUTPATIENT)
Dept: AUDIOLOGY | Facility: CLINIC | Age: 75
End: 2025-06-03
Payer: MEDICARE

## 2025-06-03 DIAGNOSIS — H90.3 SENSORINEURAL HEARING LOSS, BILATERAL: Primary | ICD-10-CM

## 2025-06-03 PROCEDURE — 92550 TYMPANOMETRY & REFLEX THRESH: CPT | Mod: 52 | Performed by: AUDIOLOGIST

## 2025-06-03 PROCEDURE — 92557 COMPREHENSIVE HEARING TEST: CPT | Performed by: AUDIOLOGIST

## 2025-06-03 ASSESSMENT — PAIN SCALES - GENERAL: PAINLEVEL_OUTOF10: 0 - NO PAIN

## 2025-06-03 ASSESSMENT — PAIN - FUNCTIONAL ASSESSMENT: PAIN_FUNCTIONAL_ASSESSMENT: 0-10

## 2025-06-03 NOTE — PROGRESS NOTES
HISTORY:  Annual hearing evaluation.   His previous hearing test showed a moderate high frequency hearing loss  He feels that his hearing is stable.    No pain  No aural fullness  No vertigo  No tinnitus  No falls.      RESULTS:  Otoscopic inspection showed clear ear canals bilaterally.    Immittance testing showed normal tympanograms bilaterally.   Ipsilateral acoustic reflexes were tested at 500-4000Hz in both ears.  They were present at 500-4000Hz in both ears.    Pure tone air and bone conduction testing showed normal hearing at 125-1000Hz sloping to a moderate sensorineural hearing loss at 8000Hz in both ears.    Word discrimination scores were excellent in the right ear and good in the left ear.     IMPRESSIONS:  Mr. Carias has a moderate high frequency sensorineural hearing loss in both ears.  His hearing has decreased in both ears from his previous hearing test.  He is not interested in hearing aids at this time.      Treatment Plan:   Annual hearing evaluations.   Consider hearing aids when patient is ready.     TIME:  180-5521

## 2025-06-07 DIAGNOSIS — E11.69 TYPE 2 DIABETES MELLITUS WITH OTHER SPECIFIED COMPLICATION, WITHOUT LONG-TERM CURRENT USE OF INSULIN: ICD-10-CM

## 2025-06-09 ENCOUNTER — APPOINTMENT (OUTPATIENT)
Dept: CARDIOLOGY | Facility: CLINIC | Age: 75
End: 2025-06-09
Payer: MEDICARE

## 2025-06-09 PROCEDURE — RXMED WILLOW AMBULATORY MEDICATION CHARGE

## 2025-06-09 RX ORDER — SEMAGLUTIDE 1.34 MG/ML
1 INJECTION, SOLUTION SUBCUTANEOUS WEEKLY
Qty: 3 ML | Refills: 0 | Status: SHIPPED | OUTPATIENT
Start: 2025-06-09

## 2025-06-10 ENCOUNTER — PHARMACY VISIT (OUTPATIENT)
Dept: PHARMACY | Facility: CLINIC | Age: 75
End: 2025-06-10
Payer: COMMERCIAL

## 2025-06-16 ENCOUNTER — ANTICOAGULATION - WARFARIN VISIT (OUTPATIENT)
Dept: CARDIOLOGY | Facility: CLINIC | Age: 75
End: 2025-06-16
Payer: MEDICARE

## 2025-06-16 DIAGNOSIS — Z79.01 LONG TERM (CURRENT) USE OF ANTICOAGULANTS: Primary | ICD-10-CM

## 2025-06-16 DIAGNOSIS — I48.91 ATRIAL FIBRILLATION, UNSPECIFIED TYPE (MULTI): ICD-10-CM

## 2025-06-16 LAB
POC INR: 2.6 (ref 0.9–1.1)
POC PROTHROMBIN TIME: ABNORMAL (ref 9.3–12.5)

## 2025-06-16 PROCEDURE — 99211 OFF/OP EST MAY X REQ PHY/QHP: CPT

## 2025-06-16 PROCEDURE — 85610 PROTHROMBIN TIME: CPT | Mod: QW

## 2025-06-16 NOTE — PROGRESS NOTES
Patient identification verified with 2 identifiers.    Location: [facility] - [address] [address] [place], [place] [address] [phone]     Referring Physician: Dr. Manjinder Francis   Enrollment/ Re-enrollment date: 2026  INR Goal: 2.0-3.0  INR monitoring is per [facility] protocol.  Anticoagulation Medication: warfarin  Indication: Atrial Fibrillation/Atrial Flutter    Subjective   Bleeding signs/symptoms: No    Bruising: No   Major bleeding event: No  Thrombosis signs/symptoms: No  Thromboembolic event: No  Missed doses: No  Extra doses: No  Medication changes: No  Dietary changes: No  Change in health: No  Change in activity: No  Alcohol: No  Other concerns: No    Upcoming Procedures:  Does the Patient Have any upcoming procedures that require interruption in anticoagulation therapy? no  Does the patient require bridging? no      Anticoagulation Summary  As of 2025      INR goal:  2.0-3.0   TTR:  62.0% (1.7 y)   INR used for dosin.60 (2025)   Weekly warfarin total:  50 mg               Assessment/Plan   Therapeutic     1. New dose: no change    2. Next INR: 2 weeks      Education provided to patient during the visit:  Patient instructed to call in interim with questions, concerns and changes.   Patient educated on compliance with dosing, follow up appointments, and prescribed plan of care.

## 2025-06-17 ENCOUNTER — APPOINTMENT (OUTPATIENT)
Dept: PHARMACY | Facility: HOSPITAL | Age: 75
End: 2025-06-17
Payer: MEDICARE

## 2025-06-17 DIAGNOSIS — E11.9 TYPE 2 DIABETES MELLITUS WITHOUT COMPLICATION, WITHOUT LONG-TERM CURRENT USE OF INSULIN: ICD-10-CM

## 2025-06-17 DIAGNOSIS — E11.69 TYPE 2 DIABETES MELLITUS WITH OTHER SPECIFIED COMPLICATION, WITHOUT LONG-TERM CURRENT USE OF INSULIN: ICD-10-CM

## 2025-06-17 PROCEDURE — RXMED WILLOW AMBULATORY MEDICATION CHARGE

## 2025-06-17 NOTE — PROGRESS NOTES
I reviewed the progress note and agree with the resident’s findings and plans as written. Case discussed with resident.    Luanne Florian, PharmD  Clinical Pharmacy Specialist   [phone]

## 2025-06-17 NOTE — PROGRESS NOTES
Patient is sent at the request of Manjinder Francis MD for my opinion regarding Type 2 diabetes.  My final recommendations will be communicated back to the requesting provider by way of shared medical record.    Subjective     Patient is a 74 year old male presenting for diabetes management. He has memory loss and his son, Javier manages his medications. Currently on Farxiga 10 mg daily and Ozempic 1 mg weekly injection.  Approved for  PAP through November 14, 2025. His son, Javier also uses Ozempic and manages patient's medications.      PAP through 11/14/2025      Past Medical History:  He has no past medical history on file.    Past Surgical History:  He has a past surgical history that includes Colonoscopy (12/05/2013); Hernia repair (12/06/2013); Colonoscopy (11/07/2014); Ablation of dysrhythmic focus (05/22/2017); and Colonoscopy (01/2021).    Social History:  He reports that he has quit smoking. His smoking use included cigarettes. He has a 15 pack-year smoking history. He has never used smokeless tobacco. He reports current alcohol use of about 5.0 standard drinks of alcohol per week. He reports that he does not use drugs.    Family History:  Family History   Problem Relation Name Age of Onset    Cancer Mother      Sleep apnea Brother         Allergies:  Iodinated contrast media  DIABETES MELLITUS TYPE 2:    Does patient follow with Endocrinology: No  Last optometry exam: 3/5/25  Most recent visit in Podiatry: every 2 months, next appt in March    Current diabetic medications include:  Farxiga 10 mg daily   Ozempic 1 mg weekly    Past diabetic medications include:  Insulin - long acting. Took about 3 years ago.     Glucose Readings:  Glucometer/CGM Type: One touch ultra meter     Current readings:  - 97 mg/dL  - 117 mg/dL  - 112 mg/dL  - 113 mg/dL  - 92 mg/dL  - 119 mg/dL  - 106 mg/dL    Previous readings:  5/2: 99 mg/dL  5/6: 100 mg/dL  5/9: 83 mg/dL  5/13: 102 mg/dL  5/18: 106 mg/dL    Any episodes of  hypoglycemia? none    Lifestyle:  Diet: Son tries to cook at home 3 meals/day. About 3 times a month they go out to eat. Tries to limit carbs.   No changes in appetite yet  BK: oatmeal   LN: hotdog  Snacks: does not snack a lot   Drinks: Diet pepsi and a lot of water and chocolate milk.   Physical Activity: Walks around stores 3 times a week 1-2 hours. Gets winded walking a lot.   Walking on the weekends 2-3 times a week 30-90 minutes   Weight  Current: 259 lbs    Previous: 257 lbs   Baseline: 262 lbs    Secondary Prevention:  Statin? Yes, Atorvastatin 80 mg   ACE-I/ARB? Yes, Lisinopril 40 mg   Aspirin? No    Pertinent PMH Review:  PMH of Pancreatitis: No  PMH of Retinopathy: No  PMH of Urinary Tract Infections: No, patient denies any symptoms of UTI  PMH of MTC: No    Drug Interactions:  none    Objective     Last Recorded Vitals:  BP Readings from Last 6 Encounters:   04/29/25 120/78   03/14/25 119/72   01/28/25 128/78   10/29/24 114/76   09/10/24 145/88   07/30/24 140/86        Wt Readings from Last 6 Encounters:   04/29/25 117 kg (259 lb)   03/14/25 119 kg (262 lb 5.6 oz)   01/28/25 119 kg (262 lb)   10/29/24 119 kg (262 lb)   09/10/24 104 kg (230 lb 6.1 oz)   07/30/24 116 kg (255 lb)       BMI Readings from Last 6 Encounters:   04/29/25 38.81 kg/m²   03/14/25 39.31 kg/m²   01/28/25 39.26 kg/m²   10/29/24 39.26 kg/m²   09/10/24 34.52 kg/m²   07/30/24 38.77 kg/m²       Lab Review  Lab Results   Component Value Date    BILITOT 0.7 07/30/2024    CALCIUM 9.4 04/09/2025    CO2 22 04/09/2025     04/09/2025    CREATININE 1.87 (H) 04/09/2025    GLUCOSE 106 (H) 04/09/2025    ALKPHOS 80 07/30/2024    K 4.3 04/09/2025    PROT 7.2 07/30/2024     04/09/2025    AST 17 07/30/2024    ALT 19 07/30/2024    BUN 24 04/09/2025    ANIONGAP 15 10/29/2024    MG 2.02 01/09/2021    PHOS 3.6 04/09/2025    ALBUMIN 4.4 04/09/2025    GFRMALE 36 (A) 08/02/2023     Lab Results   Component Value Date    TRIG 89 07/30/2024    CHOL  "134 07/30/2024    LDLCALC 69 07/30/2024    HDL 47.1 07/30/2024     Lab Results   Component Value Date    HGBA1C 7.2 (A) 04/29/2025    HGBA1C 9.2 (A) 01/28/2025    HGBA1C 7.8 (A) 10/29/2024     No components found for: \"UACR\"  The ASCVD Risk score (Bonilla DEGROOT, et al., 2019) failed to calculate for the following reasons:    Risk score cannot be calculated because patient has a medical history suggesting prior/existing ASCVD      PATIENT EDUCATION/DISCUSSION:  - Counseled patient on Farxiga MOA, expectations, side effects, duration of therapy, contraindications, administration, and monitoring parameters  - Answered all patient questions and concerns      Ozempic Education:     - Counseled patient on Ozempic MOA, expectations, side effects, duration of therapy, administration, and monitoring parameters.  - Provided detailed dosing and administration counseling to ensure proper technique.   - Reviewed Ozempic titration schedule, starting with 0.25 mg once weekly for 4 weeks, then continuing on 0.5 mg once weekly. Pt verbalized understanding.  - Counseled patient on the benefits of GLP-1ra, such as cardiovascular risk reduction, glycemic control, and weight loss potential.  - Reviewed storage requirements of Ozempic when not in use, and when to administer the medication if a dose is missed.  - Advised patient that they may experience improved satiety after meals and portion sizes of meals may be reduced as doses of Ozempic increase.  - Counseled patient to avoid foods that are fatty/oily as this may precipitate the nausea/GI upset that may occur with new start Ozempic.     Assessment/Plan   Problem List Items Addressed This Visit    None      Assessment     - A1c has significantly improved from 9.2% (1/28/25) to 7.2% (4/29/25)  - Patient has been tolerating Ozempic 1 mg and Farxiga 10 mg without any side effects or missed doses.  - FBG has been between 's mg/dL   - Denies hypoglycemic symptoms.   - Weight has been " stagnant ranging around 257-259 lbs  - Patient just received refill of Ozempic 1 mg. Counseled to complete 4 doses, then increase to Ozempic 2 mg.     Plan     CONTINUE Farxiga 10 mg once daily   Provided pharmacist's phone number ([phone]) for any questions prior to follow up.   CONTINUE Ozempic to 1 mg once weekly injection for 4 doses, then increase to Ozempic 2 mg weekly injection.  Sent script to [facility] pharmacy for delivery  CONTINUE making positive lifestyle modifications  Monitor fasting blood sugars in the morning and keep a record of them.     Follow up with Clinical Pharmacy Team 7/29/25 at 1:40 pm     Time spent with pt: Total length of time 10 (minutes) of the encounter and more than 50% was spent counseling the patient.    Continue all meds under the continuation of care with the referring provider and clinical pharmacy team.    Please reach out to the Clinical Pharmacy Team if there are any further questions.     Verbal consent to manage patient's drug therapy was obtained from patient. They were informed they may decline to participate or withdraw from participation in pharmacy services at any time.    Cornelia Castañeda, PharmD  PGY-1 Pharmacy Resident  [phone]

## 2025-06-19 ENCOUNTER — PHARMACY VISIT (OUTPATIENT)
Dept: PHARMACY | Facility: CLINIC | Age: 75
End: 2025-06-19
Payer: COMMERCIAL

## 2025-06-30 ENCOUNTER — ANTICOAGULATION - WARFARIN VISIT (OUTPATIENT)
Dept: CARDIOLOGY | Facility: CLINIC | Age: 75
End: 2025-06-30
Payer: MEDICARE

## 2025-06-30 DIAGNOSIS — Z79.01 LONG TERM (CURRENT) USE OF ANTICOAGULANTS: ICD-10-CM

## 2025-06-30 DIAGNOSIS — I48.91 ATRIAL FIBRILLATION, UNSPECIFIED TYPE (MULTI): Primary | ICD-10-CM

## 2025-06-30 DIAGNOSIS — I48.92 ATRIAL FLUTTER, UNSPECIFIED TYPE (MULTI): ICD-10-CM

## 2025-06-30 LAB
POC INR: 3.1 (ref 0.9–1.1)
POC PROTHROMBIN TIME: ABNORMAL (ref 9.3–12.5)

## 2025-06-30 PROCEDURE — 99211 OFF/OP EST MAY X REQ PHY/QHP: CPT

## 2025-06-30 PROCEDURE — 85610 PROTHROMBIN TIME: CPT | Mod: QW

## 2025-06-30 NOTE — PROGRESS NOTES
Patient identification verified with 2 identifiers.    Location: [facility] - [address] [address] [facility]. [place], [place] [address] [phone]     Referring Physician: DR. MELISSA  Enrollment/ Re-enrollment date: 02/24/26   INR Goal: 2.0-3.0  INR monitoring is per [facility] protocol.  Anticoagulation Medication: warfarin  Indication: Atrial Fibrillation/Atrial Flutter    Subjective   Bleeding signs/symptoms: No    Bruising: No   Major bleeding event: No  Thrombosis signs/symptoms: No  Thromboembolic event: No  Missed doses: No  Extra doses: No  Medication changes: No  Dietary changes: No  SON DENIES ANY DIETARY CHANGES IN VITAMIN K CONSUMPTION.  Change in health: No  Change in activity: No  Alcohol: No  Other concerns: No    Upcoming Procedures:  Does the Patient Have any upcoming procedures that require interruption in anticoagulation therapy? no  Does the patient require bridging? no      Anticoagulation Summary  As of 6/30/2025      INR goal:  2.0-3.0   TTR:  62.4% (1.8 y)   INR used for dosing:  3.10 (6/30/2025)   Weekly warfarin total:  45 mg               Assessment/Plan   Supratherapeutic     1. New dose: PT WILL DECREASE WEEKLY DOSE    2. Next INR: 1 week      Education provided to patient during the visit:  Patient instructed to call in interim with questions, concerns and changes.   Patient educated on dietary consistency in vitamin k consumption.   Patient educated on signs of bleeding/clotting.

## 2025-07-07 ENCOUNTER — ANTICOAGULATION - WARFARIN VISIT (OUTPATIENT)
Dept: CARDIOLOGY | Facility: CLINIC | Age: 75
End: 2025-07-07
Payer: MEDICARE

## 2025-07-07 DIAGNOSIS — I48.92 ATRIAL FLUTTER, UNSPECIFIED TYPE (MULTI): ICD-10-CM

## 2025-07-07 DIAGNOSIS — I48.91 ATRIAL FIBRILLATION, UNSPECIFIED TYPE (MULTI): Primary | ICD-10-CM

## 2025-07-07 DIAGNOSIS — Z79.01 LONG TERM (CURRENT) USE OF ANTICOAGULANTS: ICD-10-CM

## 2025-07-07 LAB
POC INR: 2.1 (ref 0.9–1.1)
POC PROTHROMBIN TIME: ABNORMAL (ref 9.3–12.5)

## 2025-07-07 PROCEDURE — 99211 OFF/OP EST MAY X REQ PHY/QHP: CPT

## 2025-07-07 PROCEDURE — 85610 PROTHROMBIN TIME: CPT | Mod: QW

## 2025-07-07 NOTE — PROGRESS NOTES
Patient identification verified with 2 identifiers.    Location: [facility] - [address] [address] [facility]. [place], [place] [address] [phone]     Referring Physician: LATANYA MELISSA  Enrollment/ Re-enrollment date: 2026   INR Goal: 2.0-3.0  INR monitoring is per [facility] protocol.  Anticoagulation Medication: warfarin  Indication: Atrial Fibrillation/Atrial Flutter    Subjective   Bleeding signs/symptoms: No    Bruising: No   Major bleeding event: No  Thrombosis signs/symptoms: No  Thromboembolic event: No  Missed doses: No  Extra doses: No  Medication changes: No  Dietary changes: No  Change in health: No  Change in activity: No  Alcohol: No  Other concerns: No    Upcoming Procedures:  Does the Patient Have any upcoming procedures that require interruption in anticoagulation therapy? no  Does the patient require bridging? no      Anticoagulation Summary  As of 2025      INR goal:  2.0-3.0   TTR:  62.7% (1.8 y)   INR used for dosin.10 (2025)   Weekly warfarin total:  50 mg               Assessment/Plan   Therapeutic     1. New dose: RESTORED PRIOR DOSING  2. Next INR: 1 week      Education provided to patient during the visit:  Patient instructed to call in interim with questions, concerns and changes.   Patient educated on interactions between medications and warfarin.   Patient educated on dietary consistency in vitamin k consumption.   Patient educated on affects of alcohol consumption while taking warfarin.   Patient educated on signs of bleeding/clotting.

## 2025-07-14 ENCOUNTER — ANTICOAGULATION - WARFARIN VISIT (OUTPATIENT)
Dept: CARDIOLOGY | Facility: CLINIC | Age: 75
End: 2025-07-14
Payer: MEDICARE

## 2025-07-14 DIAGNOSIS — I48.92 ATRIAL FLUTTER, UNSPECIFIED TYPE (MULTI): ICD-10-CM

## 2025-07-14 DIAGNOSIS — I48.91 ATRIAL FIBRILLATION, UNSPECIFIED TYPE (MULTI): Primary | ICD-10-CM

## 2025-07-14 LAB
POC INR: 3.2 (ref 2–3)
POC PROTHROMBIN TIME: ABNORMAL (ref 9.3–12.5)

## 2025-07-14 PROCEDURE — 85610 PROTHROMBIN TIME: CPT | Mod: QW

## 2025-07-14 PROCEDURE — 99211 OFF/OP EST MAY X REQ PHY/QHP: CPT

## 2025-07-14 NOTE — PROGRESS NOTES
Patient identification verified with 2 identifiers.    Location: [facility] - [address] [address] [place], [place] [address] [phone]     Referring Physician: Dr. Manjinder Francis   Enrollment/ Re-enrollment date: 2/24/26  INR Goal: 2.0-3.0  INR monitoring is per [facility] protocol.  Anticoagulation Medication: warfarin  Indication: Atrial Fibrillation/Atrial Flutter  Subjective   Bleeding signs/symptoms: No    Bruising: No   Major bleeding event: No  Thrombosis signs/symptoms: No  Thromboembolic event: No  Missed doses: No  Extra doses: No  Medication changes: No  Dietary changes: No  Change in health: No  Change in activity: No  Alcohol: No  Other concerns: No    Upcoming Procedures:  Does the Patient Have any upcoming procedures that require interruption in anticoagulation therapy? no  Does the patient require bridging? no      Anticoagulation Summary  As of 7/14/2025      INR goal:  2.0-3.0   TTR:  62.9% (1.8 y)   INR used for dosing:  3.20 (7/14/2025)   Weekly warfarin total:  45 mg               Assessment/Plan   Supratherapeutic     1. New dose: TWD decreased per protocol    2. Next INR: 1 week      Education provided to patient during the visit:  Patient instructed to call in interim with questions, concerns and changes.   Patient educated on interactions between medications and warfarin.   Patient educated on dietary consistency in vitamin k consumption.   Patient educated on signs of bleeding/clotting.   Patient educated on compliance with dosing, follow up appointments, and prescribed plan of care.

## 2025-07-21 ENCOUNTER — ANTICOAGULATION - WARFARIN VISIT (OUTPATIENT)
Dept: CARDIOLOGY | Facility: CLINIC | Age: 75
End: 2025-07-21
Payer: MEDICARE

## 2025-07-21 DIAGNOSIS — I48.91 ATRIAL FIBRILLATION, UNSPECIFIED TYPE (MULTI): Primary | ICD-10-CM

## 2025-07-21 DIAGNOSIS — I48.92 ATRIAL FLUTTER, UNSPECIFIED TYPE (MULTI): ICD-10-CM

## 2025-07-21 LAB
POC INR: 2 (ref 2–3)
POC PROTHROMBIN TIME: ABNORMAL (ref 9.3–12.5)

## 2025-07-21 PROCEDURE — 85610 PROTHROMBIN TIME: CPT | Mod: QW | Performed by: INTERNAL MEDICINE

## 2025-07-21 PROCEDURE — 99211 OFF/OP EST MAY X REQ PHY/QHP: CPT

## 2025-07-21 NOTE — PROGRESS NOTES
Patient identification verified with 2 identifiers.    Location: [facility] - [address] [address] [facility]. [place], [place] [address] [phone]     Referring Physician: Dr. Manjinder Francis   Enrollment/ Re-enrollment date: 26  INR Goal: 2.0-3.0  INR monitoring is per [facility] protocol.  Anticoagulation Medication: warfarin  Indication: Atrial Fibrillation/Atrial Flutter  Subjective   Bleeding signs/symptoms: No    Bruising: No   Major bleeding event: No  Thrombosis signs/symptoms: No  Thromboembolic event: No  Missed doses: No  Extra doses: No  Medication changes: Yes  Started 2mg Ozempic  Dietary changes: No  Change in health: No  Change in activity: No  Alcohol: No  Other concerns: No    Upcoming Procedures:  Does the Patient Have any upcoming procedures that require interruption in anticoagulation therapy? no  Does the patient require bridging? no      Anticoagulation Summary  As of 2025      INR goal:  2.0-3.0   TTR:  63.1% (1.8 y)   INR used for dosin.00 (2025)   Weekly warfarin total:  45 mg               Assessment/Plan   Therapeutic     1. New dose: no change    2. Next INR: 1 week      Education provided to patient during the visit:  Patient instructed to call in interim with questions, concerns and changes.   Patient educated on interactions between medications and warfarin.   Patient educated on dietary consistency in vitamin k consumption.   Patient educated on signs of bleeding/clotting.   Patient educated on compliance with dosing, follow up appointments, and prescribed plan of care.

## 2025-07-28 ENCOUNTER — ANTICOAGULATION - WARFARIN VISIT (OUTPATIENT)
Dept: CARDIOLOGY | Facility: CLINIC | Age: 75
End: 2025-07-28
Payer: MEDICARE

## 2025-07-28 DIAGNOSIS — I48.92 ATRIAL FLUTTER, UNSPECIFIED TYPE (MULTI): ICD-10-CM

## 2025-07-28 DIAGNOSIS — I48.91 ATRIAL FIBRILLATION, UNSPECIFIED TYPE (MULTI): Primary | ICD-10-CM

## 2025-07-28 LAB
POC INR: 1.8 (ref 0.9–1.1)
POC PROTHROMBIN TIME: NORMAL (ref 9.3–12.5)

## 2025-07-28 PROCEDURE — 85610 PROTHROMBIN TIME: CPT | Mod: QW | Performed by: INTERNAL MEDICINE

## 2025-07-28 PROCEDURE — 99211 OFF/OP EST MAY X REQ PHY/QHP: CPT

## 2025-07-28 NOTE — PROGRESS NOTES
Patient identification verified with 2 identifiers.    Location: [facility] - [address] [address] [address]. [place], [place] [address] [phone]     Referring Physician: Dr Francis  Enrollment/ Re-enrollment date: 2026   INR Goal: 2.0-3.0  INR monitoring is per [facility] protocol.  Anticoagulation Medication: warfarin  Indication: Atrial Fibrillation/Atrial Flutter    Subjective   Bleeding signs/symptoms: No    Bruising: No   Major bleeding event: No  Thrombosis signs/symptoms: No  Thromboembolic event: No  Missed doses: No  Extra doses: No  Medication changes: No  Dietary changes: No  Change in health: No  Change in activity: No  Alcohol: No  Other concerns: No    Upcoming Procedures:  Does the Patient Have any upcoming procedures that require interruption in anticoagulation therapy? no  Does the patient require bridging? no      Anticoagulation Summary  As of 2025      INR goal:  2.0-3.0   TTR:  62.5% (1.8 y)   INR used for dosin.80 (2025)   Weekly warfarin total:  50 mg               Assessment/Plan   Subtherapeutic     1. New dose: Warfarin dose increased    2. Next INR: 1 week      Education provided to patient during the visit:  Patient instructed to call in interim with questions, concerns and changes.   Patient educated on interactions between medications and warfarin.   Patient educated on dietary consistency in vitamin k consumption.   Patient educated on affects of alcohol consumption while taking warfarin.   Patient educated on signs of bleeding/clotting.   Patient educated on compliance with dosing, follow up appointments, and prescribed plan of care.

## 2025-07-28 NOTE — PROGRESS NOTES
Patient is sent at the request of Manjinder Francis MD for my opinion regarding Type 2 diabetes.  My final recommendations will be communicated back to the requesting provider by way of shared medical record.    Subjective     Patient is a 74 year old male presenting for diabetes management. He has memory loss and his son, Javier manages his medications. Currently on Farxiga 10 mg daily and Ozempic 1 mg weekly injection.  Approved for  PAP through November 14, 2025. His son, Javier also uses Ozempic and manages patient's medications.      PAP through 11/14/2025      Past Medical History:  He has no past medical history on file.    Past Surgical History:  He has a past surgical history that includes Colonoscopy (12/05/2013); Hernia repair (12/06/2013); Colonoscopy (11/07/2014); Ablation of dysrhythmic focus (05/22/2017); and Colonoscopy (01/2021).    Social History:  He reports that he has quit smoking. His smoking use included cigarettes. He has a 15 pack-year smoking history. He has never used smokeless tobacco. He reports current alcohol use of about 5.0 standard drinks of alcohol per week. He reports that he does not use drugs.    Family History:  Family History   Problem Relation Name Age of Onset    Cancer Mother      Sleep apnea Brother         Allergies:  Iodinated contrast media  DIABETES MELLITUS TYPE 2:    Does patient follow with Endocrinology: No  Last optometry exam: 3/5/25  Most recent visit in Podiatry: every 2 months    Current diabetic medications include:  Farxiga 10 mg daily   Ozempic 2 mg weekly    Past diabetic medications include:  Insulin - long acting. Took about 3 years ago.     Glucose Readings:  Glucometer/CGM Type: One touch ultra meter     Current readings:  - 87 mg/dL  - 103 mg/dL  - 89 mg/dL  - 88 mg/dL  - 93 mg/dL  - 119 mg/dL  - 106 mg/dL  - 88 mg/dL    Previous readings:  - 97 mg/dL  - 117 mg/dL  - 112 mg/dL  - 113 mg/dL  - 92 mg/dL  - 119 mg/dL  - 106 mg/dL    Any episodes of  hypoglycemia? none    Lifestyle:  Diet: Son tries to cook at home 3 meals/day. About 3 times a month they go out to eat. Tries to limit carbs.   No changes in appetite yet  BK: oatmeal   LN: hotdog  Snacks: does not snack a lot   Drinks: Diet pepsi and a lot of water and chocolate milk.   Physical Activity: Walks around stores 3 times a week 1-2 hours. Gets winded walking a lot.   Walking on the weekends 2-3 times a week 30-90 minutes   Weight  Current: 259 lbs    Previous: 259 lbs   Baseline: 262 lbs    Secondary Prevention:  Statin? Yes, Atorvastatin 80 mg   ACE-I/ARB? Yes, Lisinopril 40 mg   Aspirin? No    Pertinent PMH Review:  PMH of Pancreatitis: No  PMH of Retinopathy: No  PMH of Urinary Tract Infections: No, patient denies any symptoms of UTI  PMH of MTC: No    Drug Interactions:  none    Objective     Last Recorded Vitals:  BP Readings from Last 6 Encounters:   04/29/25 120/78   03/14/25 119/72   01/28/25 128/78   10/29/24 114/76   09/10/24 145/88   07/30/24 140/86        Wt Readings from Last 6 Encounters:   04/29/25 117 kg (259 lb)   03/14/25 119 kg (262 lb 5.6 oz)   01/28/25 119 kg (262 lb)   10/29/24 119 kg (262 lb)   09/10/24 104 kg (230 lb 6.1 oz)   07/30/24 116 kg (255 lb)       BMI Readings from Last 6 Encounters:   04/29/25 38.81 kg/m²   03/14/25 39.31 kg/m²   01/28/25 39.26 kg/m²   10/29/24 39.26 kg/m²   09/10/24 34.52 kg/m²   07/30/24 38.77 kg/m²       Lab Review  Lab Results   Component Value Date    BILITOT 0.7 07/30/2024    CALCIUM 9.4 04/09/2025    CO2 22 04/09/2025     04/09/2025    CREATININE 1.87 (H) 04/09/2025    GLUCOSE 106 (H) 04/09/2025    ALKPHOS 80 07/30/2024    K 4.3 04/09/2025    PROT 7.2 07/30/2024     04/09/2025    AST 17 07/30/2024    ALT 19 07/30/2024    BUN 24 04/09/2025    ANIONGAP 15 10/29/2024    MG 2.02 01/09/2021    PHOS 3.6 04/09/2025    ALBUMIN 4.4 04/09/2025    GFRMALE 36 (A) 08/02/2023     Lab Results   Component Value Date    TRIG 89 07/30/2024    CHOL  "134 07/30/2024    LDLCALC 69 07/30/2024    HDL 47.1 07/30/2024     Lab Results   Component Value Date    HGBA1C 7.2 (A) 04/29/2025    HGBA1C 9.2 (A) 01/28/2025    HGBA1C 7.8 (A) 10/29/2024     No components found for: \"UACR\"  The ASCVD Risk score (Bonilla DEGROOT, et al., 2019) failed to calculate for the following reasons:    Risk score cannot be calculated because patient has a medical history suggesting prior/existing ASCVD      PATIENT EDUCATION/DISCUSSION:  - Counseled patient on Farxiga MOA, expectations, side effects, duration of therapy, contraindications, administration, and monitoring parameters  - Answered all patient questions and concerns      Ozempic Education:     - Counseled patient on Ozempic MOA, expectations, side effects, duration of therapy, administration, and monitoring parameters.  - Provided detailed dosing and administration counseling to ensure proper technique.   - Reviewed Ozempic titration schedule, starting with 0.25 mg once weekly for 4 weeks, then continuing on 0.5 mg once weekly. Pt verbalized understanding.  - Counseled patient on the benefits of GLP-1ra, such as cardiovascular risk reduction, glycemic control, and weight loss potential.  - Reviewed storage requirements of Ozempic when not in use, and when to administer the medication if a dose is missed.  - Advised patient that they may experience improved satiety after meals and portion sizes of meals may be reduced as doses of Ozempic increase.  - Counseled patient to avoid foods that are fatty/oily as this may precipitate the nausea/GI upset that may occur with new start Ozempic.     Assessment/Plan   Problem List Items Addressed This Visit    None      Assessment     - A1c has significantly improved from 9.2% (1/28/25) to 7.2% (4/29/25)  - Patient has been tolerating Ozempic 2 mg and Farxiga 10 mg without any side effects or missed doses.  - FBG has been between 's mg/dL   - Denies hypoglycemic symptoms.   - Due for A1c check. " Orders have been placed and patient to get blood work completed prior to follow up.   - Weight has been stagnant ranging around 257-259 lbs, however patient has only had 2 doses so far. Discussed continuing medication at current dose and reassess in 4 weeks.      Plan     CONTINUE Farxiga 10 mg once daily   Provided pharmacist's phone number ([phone]) for any questions prior to follow up.   CONTINUE Ozempic to 2 mg once weekly injection   Refill available at [facility] for delivery  CONTINUE making positive lifestyle modifications  Monitor fasting blood sugars in the morning and keep a record of them.     Follow up with Clinical Pharmacy Team 8/26/25 at 1:40 pm     Time spent with pt: Total length of time 15 (minutes) of the encounter and more than 50% was spent counseling the patient.    Continue all meds under the continuation of care with the referring provider and clinical pharmacy team.    Please reach out to the Clinical Pharmacy Team if there are any further questions.     Verbal consent to manage patient's drug therapy was obtained from patient. They were informed they may decline to participate or withdraw from participation in pharmacy services at any time.    Cornelia Castañeda, PharmD  Clinical Pharmacist   [phone]

## 2025-07-29 ENCOUNTER — TELEMEDICINE (OUTPATIENT)
Dept: PHARMACY | Facility: HOSPITAL | Age: 75
End: 2025-07-29
Payer: MEDICARE

## 2025-07-29 ENCOUNTER — APPOINTMENT (OUTPATIENT)
Dept: PRIMARY CARE | Facility: CLINIC | Age: 75
End: 2025-07-29
Payer: MEDICARE

## 2025-07-29 DIAGNOSIS — E11.69 TYPE 2 DIABETES MELLITUS WITH OTHER SPECIFIED COMPLICATION, WITHOUT LONG-TERM CURRENT USE OF INSULIN: Primary | ICD-10-CM

## 2025-07-29 PROCEDURE — RXMED WILLOW AMBULATORY MEDICATION CHARGE

## 2025-07-29 RX ORDER — DEXTROSE 4 G
TABLET,CHEWABLE ORAL
Qty: 1 EACH | Refills: 0 | Status: SHIPPED | OUTPATIENT
Start: 2025-07-29

## 2025-07-29 RX ORDER — CALCIUM CITRATE/VITAMIN D3 200MG-6.25
TABLET ORAL
Qty: 100 STRIP | Refills: 11 | Status: SHIPPED | OUTPATIENT
Start: 2025-07-29

## 2025-07-29 RX ORDER — LANCETS
EACH MISCELLANEOUS
Qty: 100 EACH | Refills: 3 | Status: SHIPPED | OUTPATIENT
Start: 2025-07-29

## 2025-07-31 ENCOUNTER — PHARMACY VISIT (OUTPATIENT)
Dept: PHARMACY | Facility: CLINIC | Age: 75
End: 2025-07-31
Payer: COMMERCIAL

## 2025-07-31 LAB
EST. AVERAGE GLUCOSE BLD GHB EST-MCNC: 140 MG/DL
EST. AVERAGE GLUCOSE BLD GHB EST-SCNC: 7.7 MMOL/L
HBA1C MFR BLD: 6.5 %

## 2025-08-04 ENCOUNTER — ANTICOAGULATION - WARFARIN VISIT (OUTPATIENT)
Dept: CARDIOLOGY | Facility: CLINIC | Age: 75
End: 2025-08-04
Payer: MEDICARE

## 2025-08-04 DIAGNOSIS — I48.91 ATRIAL FIBRILLATION, UNSPECIFIED TYPE (MULTI): Primary | ICD-10-CM

## 2025-08-04 DIAGNOSIS — I48.92 ATRIAL FLUTTER, UNSPECIFIED TYPE (MULTI): ICD-10-CM

## 2025-08-04 LAB
POC INR: 2.4 (ref 0.9–1.1)
POC PROTHROMBIN TIME: NORMAL (ref 9.3–12.5)

## 2025-08-04 PROCEDURE — 99211 OFF/OP EST MAY X REQ PHY/QHP: CPT | Performed by: INTERNAL MEDICINE

## 2025-08-04 PROCEDURE — 85610 PROTHROMBIN TIME: CPT | Mod: QW | Performed by: INTERNAL MEDICINE

## 2025-08-04 NOTE — PROGRESS NOTES
Patient identification verified with 2 identifiers.    Location: [facility] - [address] [address] [place], [place] [address] [phone]     Referring Physician: Dr. Manjinder Francis  Enrollment/ Re-enrollment date: 2026   INR Goal: 2.0-3.0  INR monitoring is per [facility] protocol.  Anticoagulation Medication: warfarin  Indication: Atrial Fibrillation/Atrial Flutter    Subjective   Bleeding signs/symptoms: No    Bruising: No   Major bleeding event: No  Thrombosis signs/symptoms: No  Thromboembolic event: No  Missed doses: No  Extra doses: No  Medication changes: No  Dietary changes: No  Change in health: No  Change in activity: No  Alcohol: No  Other concerns: No    Upcoming Procedures:  Does the Patient Have any upcoming procedures that require interruption in anticoagulation therapy? no  Does the patient require bridging? no      Anticoagulation Summary  As of 2025      INR goal:  2.0-3.0   TTR:  62.5% (1.9 y)   INR used for dosin.40 (2025)   Weekly warfarin total:  50 mg               Assessment/Plan   Therapeutic     1. New dose: no change    2. Next INR: 1 week      Education provided to patient during the visit:  Patient instructed to call in interim with questions, concerns and changes.   Patient educated on interactions between medications and warfarin.   Patient educated on dietary consistency in vitamin k consumption.   Patient educated on signs of bleeding/clotting.   Patient educated on compliance with dosing, follow up appointments, and prescribed plan of care.

## 2025-08-05 PROCEDURE — RXMED WILLOW AMBULATORY MEDICATION CHARGE

## 2025-08-07 ENCOUNTER — PHARMACY VISIT (OUTPATIENT)
Dept: PHARMACY | Facility: CLINIC | Age: 75
End: 2025-08-07
Payer: COMMERCIAL

## 2025-08-11 ENCOUNTER — ANTICOAGULATION - WARFARIN VISIT (OUTPATIENT)
Dept: CARDIOLOGY | Facility: CLINIC | Age: 75
End: 2025-08-11
Payer: MEDICARE

## 2025-08-11 DIAGNOSIS — E11.69 TYPE 2 DIABETES MELLITUS WITH OTHER SPECIFIED COMPLICATION, WITHOUT LONG-TERM CURRENT USE OF INSULIN: Primary | ICD-10-CM

## 2025-08-11 DIAGNOSIS — I48.91 ATRIAL FIBRILLATION, UNSPECIFIED TYPE (MULTI): Primary | ICD-10-CM

## 2025-08-11 DIAGNOSIS — I48.92 ATRIAL FLUTTER, UNSPECIFIED TYPE (MULTI): ICD-10-CM

## 2025-08-11 LAB
POC INR: 2.4 (ref 0.9–1.1)
POC PROTHROMBIN TIME: ABNORMAL (ref 9.3–12.5)

## 2025-08-11 PROCEDURE — 85610 PROTHROMBIN TIME: CPT | Mod: QW | Performed by: INTERNAL MEDICINE

## 2025-08-11 PROCEDURE — 99211 OFF/OP EST MAY X REQ PHY/QHP: CPT

## 2025-08-11 RX ORDER — DEXTROSE 4 G
1 TABLET,CHEWABLE ORAL 2 TIMES DAILY
Qty: 1 EACH | Refills: 1 | Status: SHIPPED | OUTPATIENT
Start: 2025-08-11

## 2025-08-25 ENCOUNTER — ANTICOAGULATION - WARFARIN VISIT (OUTPATIENT)
Dept: CARDIOLOGY | Facility: CLINIC | Age: 75
End: 2025-08-25
Payer: MEDICARE

## 2025-08-25 DIAGNOSIS — I48.92 ATRIAL FLUTTER, UNSPECIFIED TYPE (MULTI): ICD-10-CM

## 2025-08-25 DIAGNOSIS — I48.91 ATRIAL FIBRILLATION, UNSPECIFIED TYPE (MULTI): Primary | ICD-10-CM

## 2025-08-25 LAB
POC INR: 2.6 (ref 0.9–1.1)
POC PROTHROMBIN TIME: ABNORMAL (ref 9.3–12.5)

## 2025-08-25 PROCEDURE — 85610 PROTHROMBIN TIME: CPT | Mod: QW | Performed by: INTERNAL MEDICINE

## 2025-08-25 PROCEDURE — 99211 OFF/OP EST MAY X REQ PHY/QHP: CPT

## 2025-08-26 ENCOUNTER — APPOINTMENT (OUTPATIENT)
Dept: PHARMACY | Facility: HOSPITAL | Age: 75
End: 2025-08-26
Payer: MEDICARE

## 2025-08-26 DIAGNOSIS — E11.69 TYPE 2 DIABETES MELLITUS WITH OTHER SPECIFIED COMPLICATION, WITHOUT LONG-TERM CURRENT USE OF INSULIN: ICD-10-CM

## 2025-08-26 PROCEDURE — RXMED WILLOW AMBULATORY MEDICATION CHARGE

## 2025-08-26 RX ORDER — LANCETS
EACH MISCELLANEOUS
Qty: 100 EACH | Refills: 11 | Status: SHIPPED | OUTPATIENT
Start: 2025-08-26

## 2025-08-27 ENCOUNTER — PHARMACY VISIT (OUTPATIENT)
Dept: PHARMACY | Facility: CLINIC | Age: 75
End: 2025-08-27
Payer: COMMERCIAL

## 2025-09-03 DIAGNOSIS — E55.9 VITAMIN D DEFICIENCY: ICD-10-CM

## 2025-09-03 DIAGNOSIS — I48.91 ATRIAL FIBRILLATION, UNSPECIFIED TYPE (MULTI): ICD-10-CM

## 2025-09-03 RX ORDER — WARFARIN 10 MG/1
TABLET ORAL
Qty: 90 TABLET | Refills: 3 | Status: SHIPPED | OUTPATIENT
Start: 2025-09-03

## 2025-09-03 RX ORDER — ERGOCALCIFEROL 1.25 MG/1
1.25 CAPSULE ORAL
Qty: 12 CAPSULE | Refills: 1 | Status: SHIPPED | OUTPATIENT
Start: 2025-09-07

## 2025-10-08 ENCOUNTER — APPOINTMENT (OUTPATIENT)
Dept: PRIMARY CARE | Facility: CLINIC | Age: 75
End: 2025-10-08
Payer: MEDICARE

## 2025-10-21 ENCOUNTER — APPOINTMENT (OUTPATIENT)
Dept: PHARMACY | Facility: HOSPITAL | Age: 75
End: 2025-10-21
Payer: MEDICARE